# Patient Record
Sex: FEMALE | Race: WHITE
[De-identification: names, ages, dates, MRNs, and addresses within clinical notes are randomized per-mention and may not be internally consistent; named-entity substitution may affect disease eponyms.]

---

## 2015-10-05 RX ADMIN — Medication SCH MG: at 21:11

## 2015-10-05 RX ADMIN — Medication SCH MG: at 21:10

## 2015-10-05 RX ADMIN — Medication SCH EACH: at 21:10

## 2015-10-05 RX ADMIN — MAGNESIUM OXIDE TAB 400 MG (241.3 MG ELEMENTAL MG) SCH TAB: 400 (241.3 MG) TAB at 21:11

## 2015-10-05 NOTE — HP
CHIEF COMPLAINT:  Increasing leg weakness.

 

HISTORY OF PRESENT ILLNESS:  This is an 86-year-old female, who previously lived

independently at her home in [place], however, she is having increasing weakness

of her legs and difficulty getting around her home over the last several years,

but definitely increasing in the last 6 months.  The patient does have a history

of atrial fibrillation.  She denies any fast heart rates, unless she drinks

caffeine.  She has never had a heart attack or stroke.  She does have

hypothyroidism, treated with Synthroid.  Otherwise also does take metoprolol,

but no other prescription medications.  She relies on a bath aide to come in her

home and help her once a week.  She is having difficulty with getting dressed

due to shoulder pain.

 

ALLERGIES:  Include none.

 

MEDICATIONS:  Include, metoprolol 12.5 mg daily, Coenzyme Q10 100 b.i.d.,

calcium, magnesium and zinc daily, vitamin D 5000 units daily, Omega-3 1200

daily, multivitamin daily, levothyroxine 75 mcg daily and Pradaxa 150 b.i.d.

 

PAST MEDICAL HISTORY:  Includes,

1. Atrial fibrillation with possible TIA with vision changes back in ,

    admitted to [facility] for that.

2. Hypothyroidism.

3. Leg weakness.

4. Hypertension.

5. Osteoarthritis.

 

PAST SURGICAL HISTORY:  She has had cataracts and a partial hysterectomy.

 

FAMILY HISTORY:  Her mom had an MI at 62, passed away.  Dad had breast cancer at

age 80, but lived to [facility].  He had heart disease and a pacer as well.

 

SOCIAL HISTORY:  She is .  Her  had Alzheimer's,  about 4

years ago.  She has 4 children.  She has a daughter who lives in [place],

also who works at the hospital.  A son in [place].  Another son in [place] and one

in [place], [place].  She is a nonsmoker, nondrinker.

 

REVIEW OF SYSTEMS:

General:  No weight changes.

Neurologic:  No numbness, no tingling.

Musculoskeletal:  She has knee pain especially on the left knee.

Cardiac:  No chest pain.  No cough.  No shortness of breath.

Respiratory:  No coughing.

Abdomen:  No abdominal pain, diarrhea or constipation.  Otherwise all systems

reviewed and found to be negative unless otherwise stated.

 

PHYSICAL EXAMINATION:

Vital Signs:  Include a temperature of 98, pulse 52, blood pressure 143/70,

respiratory rate 16, O2 96% on room air.

General:  She is in no acute distress.

Heart:  Irregularly irregular without murmur.

Lungs:  Sounds are clear to auscultation bilaterally without crackles or

wheezes.

Abdomen:  Positive bowel sounds.  Soft and nontender.

Extremities:  Warm and dry, no edema.  Dorsalis pedis pulses 2+.

Mental Status:  She is alert and orientated x3.  Gait was not observed.

 

ASSESSMENT AND PLAN:  Leg weakness and pain, probably related to arthritis.  We

will continue to monitor her thyroid.  Her last TSH was suppressed.  We will

also check a CK, ESR, B12 level to rule out other causes of weakness and we will

do that right now.  She did have lab work back in August as well, and her

glucose was 125 at that point, so we will check an A1c also.  Otherwise, CBC

again for weakness.  We will continue to monitor her on Swing Bed with PT and OT

involved,  as well.  The patient does not feel she can return

home and needs assistance for dressing and bathing and toileting.

 

 

MKA:  10/05/2015 17:41:04  MODL:  10/05/2015 23:30:43

Job #:  [id number]

## 2015-10-06 LAB
CHLORIDE SERPL-SCNC: 106 MEQ/L (ref 98–107)
SODIUM SERPL-SCNC: 144 MEQ/L (ref 136–145)

## 2015-10-06 RX ADMIN — Medication SCH MG: at 20:19

## 2015-10-06 RX ADMIN — Medication SCH MG: at 09:01

## 2015-10-06 RX ADMIN — Medication SCH MG: at 08:59

## 2015-10-06 RX ADMIN — MAGNESIUM OXIDE TAB 400 MG (241.3 MG ELEMENTAL MG) SCH TAB: 400 (241.3 MG) TAB at 20:18

## 2015-10-06 RX ADMIN — Medication SCH UNIT: at 08:58

## 2015-10-06 RX ADMIN — Medication SCH MG: at 20:15

## 2015-10-06 RX ADMIN — Medication SCH EACH: at 20:14

## 2015-10-06 RX ADMIN — Medication SCH MG: at 20:18

## 2015-10-06 RX ADMIN — Medication SCH MG: at 09:00

## 2015-10-07 RX ADMIN — Medication SCH MG: at 09:06

## 2015-10-07 RX ADMIN — Medication SCH EACH: at 20:31

## 2015-10-07 RX ADMIN — Medication SCH EACH: at 20:32

## 2015-10-07 RX ADMIN — Medication SCH MG: at 20:33

## 2015-10-07 RX ADMIN — MAGNESIUM OXIDE TAB 400 MG (241.3 MG ELEMENTAL MG) SCH TAB: 400 (241.3 MG) TAB at 20:32

## 2015-10-07 RX ADMIN — Medication SCH MG: at 09:05

## 2015-10-07 RX ADMIN — Medication SCH UNIT: at 09:04

## 2015-10-08 RX ADMIN — Medication SCH MG: at 08:41

## 2015-10-08 RX ADMIN — Medication SCH EACH: at 08:43

## 2015-10-08 RX ADMIN — Medication SCH MG: at 20:03

## 2015-10-08 RX ADMIN — Medication SCH EACH: at 20:03

## 2015-10-08 RX ADMIN — MAGNESIUM OXIDE TAB 400 MG (241.3 MG ELEMENTAL MG) SCH TAB: 400 (241.3 MG) TAB at 20:03

## 2015-10-08 RX ADMIN — Medication SCH MG: at 08:42

## 2015-10-08 RX ADMIN — Medication SCH MG: at 20:04

## 2015-10-08 RX ADMIN — Medication SCH UNIT: at 08:42

## 2015-10-09 RX ADMIN — Medication SCH UNIT: at 09:41

## 2015-10-09 RX ADMIN — Medication SCH MG: at 19:49

## 2015-10-09 RX ADMIN — Medication SCH MG: at 09:43

## 2015-10-09 RX ADMIN — METOPROLOL TARTRATE SCH MG: 25 TABLET, FILM COATED ORAL at 09:42

## 2015-10-09 RX ADMIN — Medication SCH MG: at 19:47

## 2015-10-09 RX ADMIN — Medication SCH EACH: at 09:42

## 2015-10-09 RX ADMIN — MAGNESIUM OXIDE TAB 400 MG (241.3 MG ELEMENTAL MG) SCH TAB: 400 (241.3 MG) TAB at 19:48

## 2015-10-09 RX ADMIN — Medication SCH EACH: at 19:47

## 2015-10-10 RX ADMIN — MAGNESIUM OXIDE TAB 400 MG (241.3 MG ELEMENTAL MG) SCH TAB: 400 (241.3 MG) TAB at 20:50

## 2015-10-10 RX ADMIN — Medication SCH EACH: at 20:50

## 2015-10-10 RX ADMIN — Medication SCH EACH: at 10:04

## 2015-10-10 RX ADMIN — Medication SCH MG: at 10:04

## 2015-10-10 RX ADMIN — METOPROLOL TARTRATE SCH MG: 25 TABLET, FILM COATED ORAL at 10:02

## 2015-10-10 RX ADMIN — Medication SCH EACH: at 20:49

## 2015-10-10 RX ADMIN — Medication SCH MG: at 20:50

## 2015-10-10 RX ADMIN — Medication SCH UNIT: at 10:03

## 2015-10-11 RX ADMIN — METOPROLOL TARTRATE SCH MG: 25 TABLET, FILM COATED ORAL at 08:20

## 2015-10-11 RX ADMIN — MAGNESIUM OXIDE TAB 400 MG (241.3 MG ELEMENTAL MG) SCH TAB: 400 (241.3 MG) TAB at 20:22

## 2015-10-11 RX ADMIN — Medication SCH UNIT: at 08:20

## 2015-10-11 RX ADMIN — Medication SCH EACH: at 08:19

## 2015-10-11 RX ADMIN — Medication SCH EACH: at 20:21

## 2015-10-11 RX ADMIN — Medication SCH MG: at 08:20

## 2015-10-11 RX ADMIN — Medication SCH MG: at 20:22

## 2015-10-11 RX ADMIN — Medication SCH MG: at 20:21

## 2015-10-12 RX ADMIN — Medication SCH MG: at 08:52

## 2015-10-12 RX ADMIN — METOPROLOL TARTRATE SCH MG: 25 TABLET, FILM COATED ORAL at 08:51

## 2015-10-12 RX ADMIN — Medication SCH MG: at 20:01

## 2015-10-12 RX ADMIN — Medication SCH EACH: at 08:52

## 2015-10-12 RX ADMIN — Medication SCH MG: at 20:02

## 2015-10-12 RX ADMIN — Medication SCH EACH: at 20:02

## 2015-10-12 RX ADMIN — MAGNESIUM OXIDE TAB 400 MG (241.3 MG ELEMENTAL MG) SCH TAB: 400 (241.3 MG) TAB at 20:01

## 2015-10-12 RX ADMIN — Medication SCH EACH: at 20:01

## 2015-10-12 RX ADMIN — Medication SCH UNIT: at 08:51

## 2015-10-13 RX ADMIN — Medication SCH EACH: at 07:55

## 2015-10-13 RX ADMIN — Medication SCH UNIT: at 07:54

## 2015-10-13 RX ADMIN — METOPROLOL TARTRATE SCH: 25 TABLET, FILM COATED ORAL at 07:54

## 2015-10-13 RX ADMIN — Medication SCH MG: at 07:53

## 2015-10-13 RX ADMIN — METOPROLOL TARTRATE SCH MG: 25 TABLET, FILM COATED ORAL at 08:00

## 2015-10-13 RX ADMIN — Medication SCH EACH: at 19:37

## 2015-10-13 RX ADMIN — MAGNESIUM OXIDE TAB 400 MG (241.3 MG ELEMENTAL MG) SCH TAB: 400 (241.3 MG) TAB at 19:38

## 2015-10-13 RX ADMIN — Medication SCH MG: at 19:38

## 2015-10-14 RX ADMIN — MAGNESIUM OXIDE TAB 400 MG (241.3 MG ELEMENTAL MG) SCH TAB: 400 (241.3 MG) TAB at 20:39

## 2015-10-14 RX ADMIN — METOPROLOL TARTRATE SCH MG: 25 TABLET, FILM COATED ORAL at 07:57

## 2015-10-14 RX ADMIN — Medication SCH MG: at 07:57

## 2015-10-14 RX ADMIN — Medication SCH EACH: at 07:56

## 2015-10-14 RX ADMIN — Medication SCH MG: at 20:39

## 2015-10-14 RX ADMIN — Medication SCH EACH: at 20:39

## 2015-10-14 RX ADMIN — Medication SCH UNIT: at 07:57

## 2015-10-14 RX ADMIN — Medication SCH EACH: at 20:38

## 2015-10-15 RX ADMIN — Medication SCH MG: at 08:23

## 2015-10-15 RX ADMIN — METOPROLOL TARTRATE SCH MG: 25 TABLET, FILM COATED ORAL at 08:25

## 2015-10-15 RX ADMIN — Medication SCH UNIT: at 08:18

## 2015-10-15 RX ADMIN — Medication SCH EACH: at 08:23

## 2015-10-15 RX ADMIN — MAGNESIUM OXIDE TAB 400 MG (241.3 MG ELEMENTAL MG) SCH TAB: 400 (241.3 MG) TAB at 20:21

## 2015-10-15 RX ADMIN — Medication SCH MG: at 20:21

## 2015-10-15 RX ADMIN — Medication SCH EACH: at 20:20

## 2015-10-15 RX ADMIN — Medication SCH MG: at 20:20

## 2015-10-16 RX ADMIN — Medication SCH MG: at 20:48

## 2015-10-16 RX ADMIN — Medication SCH MG: at 08:55

## 2015-10-16 RX ADMIN — Medication SCH MG: at 20:47

## 2015-10-16 RX ADMIN — Medication SCH EACH: at 08:55

## 2015-10-16 RX ADMIN — Medication SCH EACH: at 20:47

## 2015-10-16 RX ADMIN — Medication SCH EACH: at 20:48

## 2015-10-16 RX ADMIN — MAGNESIUM OXIDE TAB 400 MG (241.3 MG ELEMENTAL MG) SCH TAB: 400 (241.3 MG) TAB at 20:47

## 2015-10-16 RX ADMIN — METOPROLOL TARTRATE SCH MG: 25 TABLET, FILM COATED ORAL at 08:54

## 2015-10-16 RX ADMIN — Medication SCH UNIT: at 08:54

## 2015-10-17 RX ADMIN — MAGNESIUM OXIDE TAB 400 MG (241.3 MG ELEMENTAL MG) SCH TAB: 400 (241.3 MG) TAB at 20:16

## 2015-10-17 RX ADMIN — Medication SCH UNIT: at 08:59

## 2015-10-17 RX ADMIN — METOPROLOL TARTRATE SCH MG: 25 TABLET, FILM COATED ORAL at 08:59

## 2015-10-17 RX ADMIN — Medication SCH MG: at 20:17

## 2015-10-17 RX ADMIN — Medication SCH EACH: at 20:17

## 2015-10-17 RX ADMIN — Medication SCH MG: at 08:59

## 2015-10-17 RX ADMIN — Medication SCH EACH: at 08:59

## 2015-10-17 RX ADMIN — Medication SCH EACH: at 20:16

## 2015-10-18 RX ADMIN — Medication SCH MG: at 19:56

## 2015-10-18 RX ADMIN — Medication SCH EACH: at 07:34

## 2015-10-18 RX ADMIN — METOPROLOL TARTRATE SCH MG: 25 TABLET, FILM COATED ORAL at 07:33

## 2015-10-18 RX ADMIN — Medication SCH EACH: at 19:56

## 2015-10-18 RX ADMIN — Medication SCH MG: at 07:34

## 2015-10-18 RX ADMIN — Medication SCH UNIT: at 07:32

## 2015-10-18 RX ADMIN — MAGNESIUM OXIDE TAB 400 MG (241.3 MG ELEMENTAL MG) SCH TAB: 400 (241.3 MG) TAB at 19:56

## 2015-10-19 RX ADMIN — METOPROLOL TARTRATE SCH MG: 25 TABLET, FILM COATED ORAL at 08:08

## 2015-10-19 RX ADMIN — Medication SCH EACH: at 20:34

## 2015-10-19 RX ADMIN — Medication SCH EACH: at 20:33

## 2015-10-19 RX ADMIN — Medication SCH MG: at 20:34

## 2015-10-19 RX ADMIN — Medication SCH MG: at 08:08

## 2015-10-19 RX ADMIN — MAGNESIUM OXIDE TAB 400 MG (241.3 MG ELEMENTAL MG) SCH TAB: 400 (241.3 MG) TAB at 20:34

## 2015-10-19 RX ADMIN — Medication SCH MG: at 20:35

## 2015-10-19 RX ADMIN — Medication SCH EACH: at 08:07

## 2015-10-19 RX ADMIN — Medication SCH UNIT: at 08:07

## 2015-10-20 RX ADMIN — Medication SCH MG: at 20:09

## 2015-10-20 RX ADMIN — Medication SCH UNIT: at 08:10

## 2015-10-20 RX ADMIN — Medication SCH EACH: at 08:10

## 2015-10-20 RX ADMIN — Medication SCH EACH: at 20:08

## 2015-10-20 RX ADMIN — MAGNESIUM OXIDE TAB 400 MG (241.3 MG ELEMENTAL MG) SCH TAB: 400 (241.3 MG) TAB at 20:08

## 2015-10-20 RX ADMIN — METOPROLOL TARTRATE SCH MG: 25 TABLET, FILM COATED ORAL at 08:11

## 2015-10-20 RX ADMIN — Medication SCH MG: at 08:11

## 2015-10-20 RX ADMIN — Medication SCH MG: at 20:08

## 2015-10-21 RX ADMIN — Medication SCH EACH: at 20:50

## 2015-10-21 RX ADMIN — Medication SCH MG: at 20:51

## 2015-10-21 RX ADMIN — Medication SCH EACH: at 20:51

## 2015-10-21 RX ADMIN — Medication SCH EACH: at 08:33

## 2015-10-21 RX ADMIN — Medication SCH MG: at 08:32

## 2015-10-21 RX ADMIN — Medication SCH UNIT: at 08:32

## 2015-10-21 RX ADMIN — METOPROLOL TARTRATE SCH MG: 25 TABLET, FILM COATED ORAL at 08:32

## 2015-10-21 RX ADMIN — MAGNESIUM OXIDE TAB 400 MG (241.3 MG ELEMENTAL MG) SCH TAB: 400 (241.3 MG) TAB at 20:51

## 2015-10-22 RX ADMIN — METOPROLOL TARTRATE SCH MG: 25 TABLET, FILM COATED ORAL at 08:27

## 2015-10-22 RX ADMIN — MAGNESIUM OXIDE TAB 400 MG (241.3 MG ELEMENTAL MG) SCH TAB: 400 (241.3 MG) TAB at 20:06

## 2015-10-22 RX ADMIN — Medication SCH MG: at 08:27

## 2015-10-22 RX ADMIN — Medication SCH EACH: at 20:05

## 2015-10-22 RX ADMIN — Medication SCH UNIT: at 08:27

## 2015-10-22 RX ADMIN — Medication SCH MG: at 20:06

## 2015-10-22 RX ADMIN — Medication SCH EACH: at 08:27

## 2015-10-23 RX ADMIN — Medication SCH UNIT: at 07:49

## 2015-10-23 RX ADMIN — METOPROLOL TARTRATE SCH MG: 25 TABLET, FILM COATED ORAL at 07:49

## 2015-10-23 RX ADMIN — Medication SCH EACH: at 07:50

## 2015-10-23 RX ADMIN — Medication SCH EACH: at 19:40

## 2015-10-23 RX ADMIN — Medication SCH EACH: at 19:39

## 2015-10-23 RX ADMIN — MAGNESIUM OXIDE TAB 400 MG (241.3 MG ELEMENTAL MG) SCH TAB: 400 (241.3 MG) TAB at 19:39

## 2015-10-23 RX ADMIN — Medication SCH MG: at 07:49

## 2015-10-23 RX ADMIN — Medication SCH MG: at 19:40

## 2015-10-23 RX ADMIN — Medication SCH MG: at 19:39

## 2015-10-24 RX ADMIN — Medication SCH EACH: at 19:41

## 2015-10-24 RX ADMIN — Medication SCH MG: at 08:37

## 2015-10-24 RX ADMIN — Medication SCH MG: at 19:43

## 2015-10-24 RX ADMIN — Medication SCH UNIT: at 08:36

## 2015-10-24 RX ADMIN — MAGNESIUM OXIDE TAB 400 MG (241.3 MG ELEMENTAL MG) SCH TAB: 400 (241.3 MG) TAB at 19:42

## 2015-10-24 RX ADMIN — Medication SCH EACH: at 08:37

## 2015-10-24 RX ADMIN — METOPROLOL TARTRATE SCH MG: 25 TABLET, FILM COATED ORAL at 08:36

## 2015-10-24 RX ADMIN — Medication SCH EACH: at 19:42

## 2015-10-24 RX ADMIN — Medication SCH MG: at 19:41

## 2015-10-25 RX ADMIN — Medication SCH EACH: at 20:10

## 2015-10-25 RX ADMIN — Medication SCH MG: at 20:11

## 2015-10-25 RX ADMIN — METOPROLOL TARTRATE SCH MG: 25 TABLET, FILM COATED ORAL at 08:56

## 2015-10-25 RX ADMIN — Medication SCH MG: at 08:56

## 2015-10-25 RX ADMIN — MAGNESIUM OXIDE TAB 400 MG (241.3 MG ELEMENTAL MG) SCH TAB: 400 (241.3 MG) TAB at 20:11

## 2015-10-25 RX ADMIN — Medication SCH EACH: at 08:59

## 2015-10-25 RX ADMIN — Medication SCH UNIT: at 08:58

## 2015-10-25 RX ADMIN — Medication SCH MG: at 20:12

## 2015-10-25 RX ADMIN — Medication SCH EACH: at 20:11

## 2015-10-26 RX ADMIN — Medication SCH MG: at 09:35

## 2015-10-26 RX ADMIN — MAGNESIUM OXIDE TAB 400 MG (241.3 MG ELEMENTAL MG) SCH TAB: 400 (241.3 MG) TAB at 20:56

## 2015-10-26 RX ADMIN — Medication SCH UNIT: at 09:34

## 2015-10-26 RX ADMIN — Medication SCH MG: at 20:57

## 2015-10-26 RX ADMIN — Medication SCH EACH: at 20:56

## 2015-10-26 RX ADMIN — METOPROLOL TARTRATE SCH MG: 25 TABLET, FILM COATED ORAL at 09:34

## 2015-10-26 RX ADMIN — Medication SCH EACH: at 09:34

## 2015-10-26 RX ADMIN — Medication SCH EACH: at 20:55

## 2015-10-26 RX ADMIN — Medication SCH MG: at 20:56

## 2015-10-27 RX ADMIN — METOPROLOL TARTRATE SCH MG: 25 TABLET, FILM COATED ORAL at 08:47

## 2015-10-27 RX ADMIN — Medication SCH MG: at 20:20

## 2015-10-27 RX ADMIN — Medication SCH MG: at 08:46

## 2015-10-27 RX ADMIN — MAGNESIUM OXIDE TAB 400 MG (241.3 MG ELEMENTAL MG) SCH TAB: 400 (241.3 MG) TAB at 20:20

## 2015-10-27 RX ADMIN — Medication SCH MG: at 20:21

## 2015-10-27 RX ADMIN — Medication SCH EACH: at 08:49

## 2015-10-27 RX ADMIN — Medication SCH EACH: at 20:20

## 2015-10-27 RX ADMIN — Medication SCH UNIT: at 08:46

## 2015-10-28 RX ADMIN — METOPROLOL TARTRATE SCH MG: 25 TABLET, FILM COATED ORAL at 09:07

## 2015-10-28 RX ADMIN — Medication SCH MG: at 19:54

## 2015-10-28 RX ADMIN — MAGNESIUM OXIDE TAB 400 MG (241.3 MG ELEMENTAL MG) SCH TAB: 400 (241.3 MG) TAB at 19:54

## 2015-10-28 RX ADMIN — Medication SCH UNIT: at 09:07

## 2015-10-28 RX ADMIN — Medication SCH EACH: at 09:07

## 2015-10-28 RX ADMIN — Medication SCH MG: at 09:07

## 2015-10-28 RX ADMIN — Medication SCH EACH: at 19:53

## 2015-10-29 RX ADMIN — MAGNESIUM OXIDE TAB 400 MG (241.3 MG ELEMENTAL MG) SCH TAB: 400 (241.3 MG) TAB at 19:41

## 2015-10-29 RX ADMIN — Medication SCH UNIT: at 08:50

## 2015-10-29 RX ADMIN — Medication SCH MG: at 08:50

## 2015-10-29 RX ADMIN — Medication SCH EACH: at 08:49

## 2015-10-29 RX ADMIN — Medication SCH EACH: at 19:41

## 2015-10-29 RX ADMIN — METOPROLOL TARTRATE SCH MG: 25 TABLET, FILM COATED ORAL at 08:50

## 2015-10-29 RX ADMIN — Medication SCH MG: at 19:42

## 2015-10-29 RX ADMIN — Medication SCH MG: at 19:41

## 2015-10-30 RX ADMIN — Medication SCH MG: at 20:05

## 2015-10-30 RX ADMIN — METOPROLOL TARTRATE SCH MG: 25 TABLET, FILM COATED ORAL at 09:23

## 2015-10-30 RX ADMIN — MAGNESIUM OXIDE TAB 400 MG (241.3 MG ELEMENTAL MG) SCH TAB: 400 (241.3 MG) TAB at 20:04

## 2015-10-30 RX ADMIN — Medication SCH UNIT: at 09:24

## 2015-10-30 RX ADMIN — Medication SCH MG: at 09:23

## 2015-10-30 RX ADMIN — Medication SCH EACH: at 20:04

## 2015-10-30 RX ADMIN — Medication SCH EACH: at 09:23

## 2015-10-31 RX ADMIN — MAGNESIUM OXIDE TAB 400 MG (241.3 MG ELEMENTAL MG) SCH TAB: 400 (241.3 MG) TAB at 20:04

## 2015-10-31 RX ADMIN — Medication SCH UNIT: at 08:27

## 2015-10-31 RX ADMIN — Medication SCH MG: at 08:27

## 2015-10-31 RX ADMIN — METOPROLOL TARTRATE SCH MG: 25 TABLET, FILM COATED ORAL at 08:28

## 2015-10-31 RX ADMIN — Medication SCH MG: at 20:03

## 2015-10-31 RX ADMIN — Medication SCH EACH: at 20:03

## 2015-10-31 RX ADMIN — Medication SCH EACH: at 08:26

## 2015-10-31 RX ADMIN — Medication SCH MG: at 20:04

## 2015-11-01 RX ADMIN — MAGNESIUM OXIDE TAB 400 MG (241.3 MG ELEMENTAL MG) SCH TAB: 400 (241.3 MG) TAB at 20:23

## 2015-11-01 RX ADMIN — Medication SCH MG: at 07:46

## 2015-11-01 RX ADMIN — METOPROLOL TARTRATE SCH MG: 25 TABLET, FILM COATED ORAL at 07:45

## 2015-11-01 RX ADMIN — Medication SCH MG: at 20:23

## 2015-11-01 RX ADMIN — Medication SCH EACH: at 07:46

## 2015-11-01 RX ADMIN — Medication SCH EACH: at 20:22

## 2015-11-01 RX ADMIN — Medication SCH MG: at 20:22

## 2015-11-01 RX ADMIN — Medication SCH UNIT: at 07:44

## 2015-11-02 RX ADMIN — Medication SCH MG: at 08:45

## 2015-11-02 RX ADMIN — Medication SCH MG: at 20:01

## 2015-11-02 RX ADMIN — Medication SCH MG: at 20:00

## 2015-11-02 RX ADMIN — Medication SCH UNIT: at 08:45

## 2015-11-02 RX ADMIN — METOPROLOL TARTRATE SCH MG: 25 TABLET, FILM COATED ORAL at 08:45

## 2015-11-02 RX ADMIN — Medication SCH EACH: at 08:44

## 2015-11-02 RX ADMIN — MAGNESIUM OXIDE TAB 400 MG (241.3 MG ELEMENTAL MG) SCH TAB: 400 (241.3 MG) TAB at 20:01

## 2015-11-02 RX ADMIN — Medication SCH EACH: at 20:00

## 2015-11-02 NOTE — PN
Progress Note for JENIFER REWIN  Date:  11/02/2015  Room #:  VM.207

 

SUBJECTIVE:  This is an 86-year-old, on swing bed due to impaired mobility.  She

is using assistance to help her get dressed.  I reviewed her lab work with her

and all looked okay.  She is still having a lot of weakness in her legs and her

arms, but it has been coming on for years and her sedimentation rate was normal.

She has no pain, no cough, no shortness of breath.

 

OBJECTIVE:  Vital Signs:  Objectively, her temperature is 98.2, pulse 91, blood

pressure 151/85, respiratory rate 20, O2 is 96 on room air.

General:  She is in no acute distress.

Heart:  Irregularly irregular.

Lungs:  Clear to auscultation bilaterally without crackles or wheezes.

Extremities:  Warm and dry.  No edema.

Mental Status:  Alert and orientated x3.

 

ASSESSMENT:

1. Hypothyroidism, slightly overtreated.  I am going to try skipping just 1

    day a week on the levothyroxine.  We will repeat a level at the end of

    December.

2. Atrial fibrillation with probable transient ischemic attack back in 2011.

3. Chronic leg weakness.

4. Hypertension.

5. Osteoarthritis.

 

PLAN:  The patient will continue swing bed cares.  Will continue to assist her

with her ADL's.  Will continue to encourage ambulation.  She has not had any

falls.

 

 

MKA:  11/02/2015 09:09:14  MODL:  11/02/2015 09:19:11

Job #:  [id number]

## 2015-11-03 RX ADMIN — Medication SCH MG: at 21:25

## 2015-11-03 RX ADMIN — Medication SCH MG: at 08:42

## 2015-11-03 RX ADMIN — Medication SCH UNIT: at 08:41

## 2015-11-03 RX ADMIN — MAGNESIUM OXIDE TAB 400 MG (241.3 MG ELEMENTAL MG) SCH TAB: 400 (241.3 MG) TAB at 21:25

## 2015-11-03 RX ADMIN — METOPROLOL TARTRATE SCH MG: 25 TABLET, FILM COATED ORAL at 08:42

## 2015-11-03 RX ADMIN — Medication SCH EACH: at 21:24

## 2015-11-03 RX ADMIN — Medication SCH EACH: at 08:43

## 2015-11-03 RX ADMIN — Medication SCH EACH: at 21:25

## 2015-11-04 RX ADMIN — Medication SCH UNIT: at 08:20

## 2015-11-04 RX ADMIN — Medication SCH EACH: at 20:11

## 2015-11-04 RX ADMIN — METOPROLOL TARTRATE SCH MG: 25 TABLET, FILM COATED ORAL at 08:20

## 2015-11-04 RX ADMIN — Medication SCH EACH: at 08:22

## 2015-11-04 RX ADMIN — Medication SCH MG: at 08:20

## 2015-11-04 RX ADMIN — Medication SCH MG: at 20:11

## 2015-11-04 RX ADMIN — MAGNESIUM OXIDE TAB 400 MG (241.3 MG ELEMENTAL MG) SCH: 400 (241.3 MG) TAB at 20:14

## 2015-11-05 RX ADMIN — METOPROLOL TARTRATE SCH MG: 25 TABLET, FILM COATED ORAL at 09:09

## 2015-11-05 RX ADMIN — Medication SCH MG: at 09:09

## 2015-11-05 RX ADMIN — Medication SCH UNIT: at 09:08

## 2015-11-05 RX ADMIN — MAGNESIUM OXIDE TAB 400 MG (241.3 MG ELEMENTAL MG) SCH TAB: 400 (241.3 MG) TAB at 20:54

## 2015-11-05 RX ADMIN — Medication SCH EACH: at 20:53

## 2015-11-05 RX ADMIN — Medication SCH MG: at 20:54

## 2015-11-05 RX ADMIN — Medication SCH EACH: at 20:54

## 2015-11-05 RX ADMIN — Medication SCH EACH: at 09:10

## 2015-11-06 RX ADMIN — METOPROLOL TARTRATE SCH MG: 25 TABLET, FILM COATED ORAL at 08:46

## 2015-11-06 RX ADMIN — Medication SCH MG: at 19:54

## 2015-11-06 RX ADMIN — Medication SCH UNIT: at 08:46

## 2015-11-06 RX ADMIN — Medication SCH EACH: at 19:55

## 2015-11-06 RX ADMIN — MAGNESIUM OXIDE TAB 400 MG (241.3 MG ELEMENTAL MG) SCH TAB: 400 (241.3 MG) TAB at 19:54

## 2015-11-06 RX ADMIN — Medication SCH EACH: at 19:54

## 2015-11-06 RX ADMIN — Medication SCH EACH: at 08:46

## 2015-11-06 RX ADMIN — Medication SCH MG: at 08:47

## 2015-11-07 RX ADMIN — Medication SCH EACH: at 19:40

## 2015-11-07 RX ADMIN — MAGNESIUM OXIDE TAB 400 MG (241.3 MG ELEMENTAL MG) SCH TAB: 400 (241.3 MG) TAB at 19:40

## 2015-11-07 RX ADMIN — METOPROLOL TARTRATE SCH MG: 25 TABLET, FILM COATED ORAL at 08:46

## 2015-11-07 RX ADMIN — Medication SCH MG: at 19:40

## 2015-11-07 RX ADMIN — Medication SCH MG: at 19:41

## 2015-11-07 RX ADMIN — Medication SCH UNIT: at 08:46

## 2015-11-07 RX ADMIN — Medication SCH EACH: at 08:45

## 2015-11-07 RX ADMIN — Medication SCH MG: at 08:45

## 2015-11-07 RX ADMIN — Medication SCH EACH: at 19:41

## 2015-11-08 RX ADMIN — MAGNESIUM OXIDE TAB 400 MG (241.3 MG ELEMENTAL MG) SCH TAB: 400 (241.3 MG) TAB at 19:56

## 2015-11-08 RX ADMIN — Medication SCH UNIT: at 09:10

## 2015-11-08 RX ADMIN — METOPROLOL TARTRATE SCH MG: 25 TABLET, FILM COATED ORAL at 09:10

## 2015-11-08 RX ADMIN — Medication SCH EACH: at 19:56

## 2015-11-08 RX ADMIN — Medication SCH MG: at 19:56

## 2015-11-08 RX ADMIN — Medication SCH MG: at 09:10

## 2015-11-08 RX ADMIN — Medication SCH EACH: at 19:55

## 2015-11-08 RX ADMIN — Medication SCH EACH: at 09:10

## 2015-11-09 RX ADMIN — Medication SCH UNIT: at 09:14

## 2015-11-09 RX ADMIN — METOPROLOL TARTRATE SCH MG: 25 TABLET, FILM COATED ORAL at 09:15

## 2015-11-09 RX ADMIN — Medication SCH MG: at 20:40

## 2015-11-09 RX ADMIN — Medication SCH EACH: at 09:14

## 2015-11-09 RX ADMIN — Medication SCH MG: at 09:13

## 2015-11-09 RX ADMIN — Medication SCH EACH: at 20:40

## 2015-11-09 RX ADMIN — MAGNESIUM OXIDE TAB 400 MG (241.3 MG ELEMENTAL MG) SCH TAB: 400 (241.3 MG) TAB at 20:40

## 2015-11-09 RX ADMIN — Medication SCH EACH: at 20:39

## 2015-11-10 RX ADMIN — Medication SCH MG: at 20:16

## 2015-11-10 RX ADMIN — Medication SCH MG: at 08:46

## 2015-11-10 RX ADMIN — MAGNESIUM OXIDE TAB 400 MG (241.3 MG ELEMENTAL MG) SCH TAB: 400 (241.3 MG) TAB at 20:16

## 2015-11-10 RX ADMIN — Medication SCH UNIT: at 08:47

## 2015-11-10 RX ADMIN — METOPROLOL TARTRATE SCH MG: 25 TABLET, FILM COATED ORAL at 08:47

## 2015-11-10 RX ADMIN — Medication SCH EACH: at 08:48

## 2015-11-10 RX ADMIN — Medication SCH EACH: at 20:16

## 2015-11-11 RX ADMIN — Medication SCH MG: at 19:59

## 2015-11-11 RX ADMIN — Medication SCH UNIT: at 08:33

## 2015-11-11 RX ADMIN — Medication SCH MG: at 08:34

## 2015-11-11 RX ADMIN — MAGNESIUM OXIDE TAB 400 MG (241.3 MG ELEMENTAL MG) SCH TAB: 400 (241.3 MG) TAB at 19:59

## 2015-11-11 RX ADMIN — METOPROLOL TARTRATE SCH MG: 25 TABLET, FILM COATED ORAL at 08:33

## 2015-11-11 RX ADMIN — Medication SCH EACH: at 19:59

## 2015-11-11 RX ADMIN — Medication SCH EACH: at 08:33

## 2015-11-12 RX ADMIN — Medication SCH EACH: at 19:31

## 2015-11-12 RX ADMIN — Medication SCH MG: at 09:36

## 2015-11-12 RX ADMIN — Medication SCH MG: at 19:32

## 2015-11-12 RX ADMIN — METOPROLOL TARTRATE SCH MG: 25 TABLET, FILM COATED ORAL at 09:36

## 2015-11-12 RX ADMIN — Medication SCH EACH: at 09:35

## 2015-11-12 RX ADMIN — Medication SCH UNIT: at 09:35

## 2015-11-12 RX ADMIN — MAGNESIUM OXIDE TAB 400 MG (241.3 MG ELEMENTAL MG) SCH TAB: 400 (241.3 MG) TAB at 19:32

## 2015-11-13 RX ADMIN — Medication SCH EACH: at 19:39

## 2015-11-13 RX ADMIN — METOPROLOL TARTRATE SCH: 25 TABLET, FILM COATED ORAL at 08:49

## 2015-11-13 RX ADMIN — Medication SCH MG: at 08:48

## 2015-11-13 RX ADMIN — MAGNESIUM OXIDE TAB 400 MG (241.3 MG ELEMENTAL MG) SCH TAB: 400 (241.3 MG) TAB at 19:40

## 2015-11-13 RX ADMIN — Medication SCH MG: at 19:40

## 2015-11-13 RX ADMIN — Medication SCH UNIT: at 08:49

## 2015-11-13 RX ADMIN — Medication SCH MG: at 19:39

## 2015-11-13 RX ADMIN — Medication SCH EACH: at 08:52

## 2015-11-14 RX ADMIN — METOPROLOL TARTRATE SCH: 25 TABLET, FILM COATED ORAL at 08:09

## 2015-11-14 RX ADMIN — Medication SCH UNIT: at 08:08

## 2015-11-14 RX ADMIN — Medication SCH EACH: at 19:30

## 2015-11-14 RX ADMIN — Medication SCH EACH: at 08:09

## 2015-11-14 RX ADMIN — Medication SCH MG: at 19:31

## 2015-11-14 RX ADMIN — Medication SCH EACH: at 19:29

## 2015-11-14 RX ADMIN — Medication SCH MG: at 08:08

## 2015-11-14 RX ADMIN — MAGNESIUM OXIDE TAB 400 MG (241.3 MG ELEMENTAL MG) SCH TAB: 400 (241.3 MG) TAB at 19:31

## 2015-11-15 RX ADMIN — Medication SCH UNIT: at 08:21

## 2015-11-15 RX ADMIN — Medication SCH EACH: at 08:22

## 2015-11-15 RX ADMIN — MAGNESIUM OXIDE TAB 400 MG (241.3 MG ELEMENTAL MG) SCH TAB: 400 (241.3 MG) TAB at 21:07

## 2015-11-15 RX ADMIN — Medication SCH MG: at 21:09

## 2015-11-15 RX ADMIN — METOPROLOL TARTRATE SCH MG: 25 TABLET, FILM COATED ORAL at 08:20

## 2015-11-15 RX ADMIN — Medication SCH EACH: at 21:08

## 2015-11-15 RX ADMIN — Medication SCH MG: at 08:22

## 2015-11-16 RX ADMIN — Medication SCH MG: at 09:07

## 2015-11-16 RX ADMIN — Medication SCH EACH: at 09:07

## 2015-11-16 RX ADMIN — MAGNESIUM OXIDE TAB 400 MG (241.3 MG ELEMENTAL MG) SCH TAB: 400 (241.3 MG) TAB at 20:23

## 2015-11-16 RX ADMIN — Medication SCH EACH: at 20:23

## 2015-11-16 RX ADMIN — LEVOTHYROXINE SODIUM SCH MCG: 75 TABLET ORAL at 06:06

## 2015-11-16 RX ADMIN — Medication SCH MG: at 20:23

## 2015-11-16 RX ADMIN — Medication SCH UNIT: at 09:06

## 2015-11-17 RX ADMIN — Medication SCH MG: at 07:33

## 2015-11-17 RX ADMIN — Medication SCH UNIT: at 07:34

## 2015-11-17 RX ADMIN — Medication SCH EACH: at 20:06

## 2015-11-17 RX ADMIN — Medication SCH EACH: at 07:34

## 2015-11-17 RX ADMIN — Medication SCH MG: at 20:07

## 2015-11-17 RX ADMIN — LEVOTHYROXINE SODIUM SCH MCG: 75 TABLET ORAL at 06:25

## 2015-11-17 RX ADMIN — MAGNESIUM OXIDE TAB 400 MG (241.3 MG ELEMENTAL MG) SCH TAB: 400 (241.3 MG) TAB at 20:07

## 2015-11-18 RX ADMIN — Medication SCH EACH: at 19:43

## 2015-11-18 RX ADMIN — Medication SCH MG: at 09:24

## 2015-11-18 RX ADMIN — Medication SCH UNIT: at 09:24

## 2015-11-18 RX ADMIN — Medication SCH MG: at 19:45

## 2015-11-18 RX ADMIN — LEVOTHYROXINE SODIUM SCH MCG: 75 TABLET ORAL at 06:36

## 2015-11-18 RX ADMIN — MAGNESIUM OXIDE TAB 400 MG (241.3 MG ELEMENTAL MG) SCH TAB: 400 (241.3 MG) TAB at 19:44

## 2015-11-18 RX ADMIN — Medication SCH MG: at 19:44

## 2015-11-18 RX ADMIN — Medication SCH EACH: at 09:24

## 2015-11-19 RX ADMIN — Medication SCH EACH: at 09:32

## 2015-11-19 RX ADMIN — Medication SCH MG: at 20:35

## 2015-11-19 RX ADMIN — Medication SCH EACH: at 20:35

## 2015-11-19 RX ADMIN — Medication SCH MG: at 09:32

## 2015-11-19 RX ADMIN — Medication SCH UNIT: at 09:32

## 2015-11-19 RX ADMIN — LEVOTHYROXINE SODIUM SCH MCG: 75 TABLET ORAL at 06:32

## 2015-11-19 RX ADMIN — MAGNESIUM OXIDE TAB 400 MG (241.3 MG ELEMENTAL MG) SCH TAB: 400 (241.3 MG) TAB at 20:35

## 2015-11-19 RX ADMIN — Medication SCH MG: at 20:34

## 2015-11-20 RX ADMIN — Medication SCH MG: at 08:43

## 2015-11-20 RX ADMIN — Medication SCH EACH: at 21:20

## 2015-11-20 RX ADMIN — Medication SCH UNIT: at 08:43

## 2015-11-20 RX ADMIN — Medication SCH EACH: at 08:43

## 2015-11-20 RX ADMIN — LEVOTHYROXINE SODIUM SCH MCG: 75 TABLET ORAL at 06:37

## 2015-11-20 RX ADMIN — MAGNESIUM OXIDE TAB 400 MG (241.3 MG ELEMENTAL MG) SCH TAB: 400 (241.3 MG) TAB at 21:19

## 2015-11-20 RX ADMIN — Medication SCH MG: at 21:20

## 2015-11-21 RX ADMIN — Medication SCH UNIT: at 09:25

## 2015-11-21 RX ADMIN — Medication SCH EACH: at 09:25

## 2015-11-21 RX ADMIN — Medication SCH MG: at 19:51

## 2015-11-21 RX ADMIN — Medication SCH EACH: at 19:51

## 2015-11-21 RX ADMIN — Medication SCH MG: at 19:52

## 2015-11-21 RX ADMIN — MAGNESIUM OXIDE TAB 400 MG (241.3 MG ELEMENTAL MG) SCH TAB: 400 (241.3 MG) TAB at 19:51

## 2015-11-21 RX ADMIN — Medication SCH MG: at 09:26

## 2015-11-22 RX ADMIN — Medication SCH EACH: at 18:30

## 2015-11-22 RX ADMIN — Medication SCH MG: at 18:30

## 2015-11-22 RX ADMIN — LEVOTHYROXINE SODIUM SCH MCG: 75 TABLET ORAL at 06:31

## 2015-11-22 RX ADMIN — MAGNESIUM OXIDE TAB 400 MG (241.3 MG ELEMENTAL MG) SCH TAB: 400 (241.3 MG) TAB at 18:30

## 2015-11-22 RX ADMIN — Medication SCH MG: at 08:52

## 2015-11-22 RX ADMIN — Medication SCH EACH: at 18:29

## 2015-11-22 RX ADMIN — Medication SCH UNIT: at 08:52

## 2015-11-22 RX ADMIN — Medication SCH EACH: at 08:51

## 2015-11-23 RX ADMIN — Medication SCH EACH: at 21:34

## 2015-11-23 RX ADMIN — MAGNESIUM OXIDE TAB 400 MG (241.3 MG ELEMENTAL MG) SCH TAB: 400 (241.3 MG) TAB at 21:33

## 2015-11-23 RX ADMIN — Medication SCH MG: at 08:51

## 2015-11-23 RX ADMIN — Medication SCH MG: at 21:33

## 2015-11-23 RX ADMIN — Medication SCH EACH: at 21:32

## 2015-11-23 RX ADMIN — Medication SCH EACH: at 08:51

## 2015-11-23 RX ADMIN — Medication SCH UNIT: at 08:50

## 2015-11-23 RX ADMIN — LEVOTHYROXINE SODIUM SCH MCG: 75 TABLET ORAL at 06:33

## 2015-11-24 RX ADMIN — Medication SCH MG: at 09:17

## 2015-11-24 RX ADMIN — Medication SCH MG: at 21:52

## 2015-11-24 RX ADMIN — Medication SCH EACH: at 09:17

## 2015-11-24 RX ADMIN — MAGNESIUM OXIDE TAB 400 MG (241.3 MG ELEMENTAL MG) SCH TAB: 400 (241.3 MG) TAB at 21:52

## 2015-11-24 RX ADMIN — Medication SCH MG: at 21:53

## 2015-11-24 RX ADMIN — Medication SCH EACH: at 21:53

## 2015-11-24 RX ADMIN — Medication SCH UNIT: at 09:17

## 2015-11-24 RX ADMIN — Medication SCH EACH: at 21:52

## 2015-11-24 RX ADMIN — LEVOTHYROXINE SODIUM SCH MCG: 75 TABLET ORAL at 06:26

## 2015-11-25 RX ADMIN — LEVOTHYROXINE SODIUM SCH MCG: 75 TABLET ORAL at 06:38

## 2015-11-25 RX ADMIN — Medication SCH UNIT: at 09:50

## 2015-11-25 RX ADMIN — Medication SCH MG: at 09:51

## 2015-11-25 RX ADMIN — MAGNESIUM OXIDE TAB 400 MG (241.3 MG ELEMENTAL MG) SCH TAB: 400 (241.3 MG) TAB at 21:04

## 2015-11-25 RX ADMIN — Medication SCH EACH: at 21:04

## 2015-11-25 RX ADMIN — Medication SCH EACH: at 09:50

## 2015-11-25 RX ADMIN — Medication SCH MG: at 21:04

## 2015-11-26 RX ADMIN — Medication SCH MG: at 21:51

## 2015-11-26 RX ADMIN — Medication SCH MG: at 08:49

## 2015-11-26 RX ADMIN — Medication SCH UNIT: at 08:49

## 2015-11-26 RX ADMIN — Medication SCH EACH: at 08:49

## 2015-11-26 RX ADMIN — MAGNESIUM OXIDE TAB 400 MG (241.3 MG ELEMENTAL MG) SCH TAB: 400 (241.3 MG) TAB at 21:52

## 2015-11-26 RX ADMIN — Medication SCH MG: at 21:52

## 2015-11-26 RX ADMIN — LEVOTHYROXINE SODIUM SCH MCG: 75 TABLET ORAL at 06:03

## 2015-11-26 RX ADMIN — Medication SCH EACH: at 21:51

## 2015-11-27 RX ADMIN — MAGNESIUM OXIDE TAB 400 MG (241.3 MG ELEMENTAL MG) SCH TAB: 400 (241.3 MG) TAB at 20:18

## 2015-11-27 RX ADMIN — Medication SCH EACH: at 08:32

## 2015-11-27 RX ADMIN — Medication SCH UNIT: at 08:32

## 2015-11-27 RX ADMIN — Medication SCH EACH: at 20:18

## 2015-11-27 RX ADMIN — Medication SCH MG: at 08:32

## 2015-11-27 RX ADMIN — Medication SCH MG: at 20:18

## 2015-11-27 RX ADMIN — LEVOTHYROXINE SODIUM SCH MCG: 75 TABLET ORAL at 05:56

## 2015-11-28 RX ADMIN — Medication SCH EACH: at 20:27

## 2015-11-28 RX ADMIN — MAGNESIUM OXIDE TAB 400 MG (241.3 MG ELEMENTAL MG) SCH TAB: 400 (241.3 MG) TAB at 20:28

## 2015-11-28 RX ADMIN — Medication SCH MG: at 20:27

## 2015-11-28 RX ADMIN — Medication SCH EACH: at 10:13

## 2015-11-28 RX ADMIN — Medication SCH UNIT: at 10:13

## 2015-11-28 RX ADMIN — Medication SCH MG: at 10:13

## 2015-11-28 RX ADMIN — Medication SCH MG: at 20:28

## 2015-11-28 RX ADMIN — Medication SCH EACH: at 20:26

## 2015-11-29 RX ADMIN — MAGNESIUM OXIDE TAB 400 MG (241.3 MG ELEMENTAL MG) SCH TAB: 400 (241.3 MG) TAB at 22:12

## 2015-11-29 RX ADMIN — LEVOTHYROXINE SODIUM SCH MCG: 75 TABLET ORAL at 06:19

## 2015-11-29 RX ADMIN — Medication SCH UNIT: at 08:29

## 2015-11-29 RX ADMIN — Medication SCH MG: at 22:12

## 2015-11-29 RX ADMIN — Medication SCH EACH: at 08:29

## 2015-11-29 RX ADMIN — Medication SCH EACH: at 22:12

## 2015-11-29 RX ADMIN — Medication SCH MG: at 08:29

## 2015-11-29 RX ADMIN — Medication SCH EACH: at 22:11

## 2015-11-30 RX ADMIN — Medication SCH MG: at 09:05

## 2015-11-30 RX ADMIN — Medication SCH UNIT: at 09:05

## 2015-11-30 RX ADMIN — Medication SCH MG: at 20:08

## 2015-11-30 RX ADMIN — LEVOTHYROXINE SODIUM SCH MCG: 75 TABLET ORAL at 06:10

## 2015-11-30 RX ADMIN — Medication SCH MG: at 20:07

## 2015-11-30 RX ADMIN — Medication SCH EACH: at 09:05

## 2015-11-30 RX ADMIN — Medication SCH EACH: at 20:07

## 2015-11-30 RX ADMIN — MAGNESIUM OXIDE TAB 400 MG (241.3 MG ELEMENTAL MG) SCH TAB: 400 (241.3 MG) TAB at 20:07

## 2015-12-01 RX ADMIN — MAGNESIUM OXIDE TAB 400 MG (241.3 MG ELEMENTAL MG) SCH TAB: 400 (241.3 MG) TAB at 21:14

## 2015-12-01 RX ADMIN — Medication SCH EACH: at 08:46

## 2015-12-01 RX ADMIN — Medication SCH MG: at 21:14

## 2015-12-01 RX ADMIN — Medication SCH EACH: at 21:14

## 2015-12-01 RX ADMIN — LEVOTHYROXINE SODIUM SCH MCG: 75 TABLET ORAL at 06:05

## 2015-12-01 RX ADMIN — Medication SCH UNIT: at 08:46

## 2015-12-01 RX ADMIN — Medication SCH MG: at 08:45

## 2015-12-01 NOTE — PN
Progress Note for JENIFER SHEETS  Date:  12/01/2015  Room #:  VM.207

 

SUBJECTIVE:  An 86-year-old seen today for 30-day reassessment.  The patient is

on swing bed due to impaired mobility.  She states yesterday she was having more

problems with her knees.  She does use a walker to get around.  She does use

assistance to get her dress on and for bathing.  She has decided she will

probably stay in [place] on swing bed instead of going to a nursing home in

[place].  Otherwise, she is not having any chest pain or trouble breathing.

 

OBJECTIVE:  Vital Signs:  Objectively, her temperature has been 97.5, pulse 72,

blood pressure 129/78, respiratory rate 16, O2 is 96 on room air.

General:  She is in no acute distress.

Heart:  Irregularly irregular.

Lungs:  Lung sounds are clear to auscultation bilaterally without crackles or

wheezes.

Abdomen:  Not examined.

Extremities:  Warm and dry.  No edema.

Mental Status:  Alert and orientated x3.

 

ASSESSMENT:

1. Hypothyroidism, slightly overtreated.  We are going to check her TSH this

    month.

2. Atrial fibrillation with history of transient ischemic attack, on

    anticoagulation.

3. Chronic leg weakness.

4. Hypertension.

5. Osteoarthritis.

 

PLAN:  The patient will continue swing bed cares for assistance in ADLs.  I will

check a thyroid level this month.

 

 

MKA:  12/01/2015 11:30:29  MODL:  12/01/2015 11:45:22

Job #:  [id number]

## 2015-12-02 RX ADMIN — Medication SCH UNIT: at 09:26

## 2015-12-02 RX ADMIN — MAGNESIUM OXIDE TAB 400 MG (241.3 MG ELEMENTAL MG) SCH TAB: 400 (241.3 MG) TAB at 20:44

## 2015-12-02 RX ADMIN — Medication SCH MG: at 20:44

## 2015-12-02 RX ADMIN — LEVOTHYROXINE SODIUM SCH MCG: 75 TABLET ORAL at 06:06

## 2015-12-02 RX ADMIN — Medication SCH MG: at 09:26

## 2015-12-02 RX ADMIN — Medication SCH EACH: at 20:44

## 2015-12-02 RX ADMIN — Medication SCH EACH: at 09:26

## 2015-12-03 RX ADMIN — Medication SCH MG: at 19:39

## 2015-12-03 RX ADMIN — MAGNESIUM OXIDE TAB 400 MG (241.3 MG ELEMENTAL MG) SCH TAB: 400 (241.3 MG) TAB at 19:40

## 2015-12-03 RX ADMIN — Medication SCH MG: at 09:43

## 2015-12-03 RX ADMIN — Medication SCH UNIT: at 09:43

## 2015-12-03 RX ADMIN — Medication SCH MG: at 19:40

## 2015-12-03 RX ADMIN — Medication SCH EACH: at 19:39

## 2015-12-03 RX ADMIN — Medication SCH EACH: at 09:43

## 2015-12-03 RX ADMIN — LEVOTHYROXINE SODIUM SCH MCG: 75 TABLET ORAL at 06:23

## 2015-12-04 RX ADMIN — Medication SCH MG: at 20:28

## 2015-12-04 RX ADMIN — Medication SCH EACH: at 08:58

## 2015-12-04 RX ADMIN — Medication SCH MG: at 08:57

## 2015-12-04 RX ADMIN — MAGNESIUM OXIDE TAB 400 MG (241.3 MG ELEMENTAL MG) SCH TAB: 400 (241.3 MG) TAB at 20:28

## 2015-12-04 RX ADMIN — Medication SCH UNIT: at 08:58

## 2015-12-04 RX ADMIN — LEVOTHYROXINE SODIUM SCH MCG: 75 TABLET ORAL at 06:40

## 2015-12-04 RX ADMIN — Medication SCH EACH: at 20:27

## 2015-12-05 RX ADMIN — Medication SCH EACH: at 19:26

## 2015-12-05 RX ADMIN — MAGNESIUM OXIDE TAB 400 MG (241.3 MG ELEMENTAL MG) SCH TAB: 400 (241.3 MG) TAB at 19:27

## 2015-12-05 RX ADMIN — Medication SCH MG: at 08:32

## 2015-12-05 RX ADMIN — Medication SCH UNIT: at 08:33

## 2015-12-05 RX ADMIN — Medication SCH MG: at 19:27

## 2015-12-05 RX ADMIN — Medication SCH EACH: at 08:34

## 2015-12-06 RX ADMIN — LEVOTHYROXINE SODIUM SCH MCG: 75 TABLET ORAL at 06:01

## 2015-12-06 RX ADMIN — Medication SCH EACH: at 19:56

## 2015-12-06 RX ADMIN — Medication SCH MG: at 19:56

## 2015-12-06 RX ADMIN — Medication SCH MG: at 08:52

## 2015-12-06 RX ADMIN — Medication SCH UNIT: at 08:52

## 2015-12-06 RX ADMIN — MAGNESIUM OXIDE TAB 400 MG (241.3 MG ELEMENTAL MG) SCH TAB: 400 (241.3 MG) TAB at 19:56

## 2015-12-06 RX ADMIN — Medication SCH EACH: at 19:55

## 2015-12-06 RX ADMIN — Medication SCH EACH: at 08:52

## 2015-12-07 RX ADMIN — MAGNESIUM OXIDE TAB 400 MG (241.3 MG ELEMENTAL MG) SCH TAB: 400 (241.3 MG) TAB at 19:36

## 2015-12-07 RX ADMIN — Medication SCH MG: at 08:59

## 2015-12-07 RX ADMIN — Medication SCH MG: at 19:37

## 2015-12-07 RX ADMIN — LEVOTHYROXINE SODIUM SCH MCG: 75 TABLET ORAL at 05:42

## 2015-12-07 RX ADMIN — Medication SCH EACH: at 19:36

## 2015-12-07 RX ADMIN — Medication SCH MG: at 19:38

## 2015-12-07 RX ADMIN — Medication SCH EACH: at 19:37

## 2015-12-07 RX ADMIN — Medication SCH UNIT: at 08:59

## 2015-12-07 RX ADMIN — Medication SCH EACH: at 09:00

## 2015-12-08 RX ADMIN — Medication SCH EACH: at 21:08

## 2015-12-08 RX ADMIN — Medication SCH MG: at 21:08

## 2015-12-08 RX ADMIN — Medication SCH UNIT: at 10:51

## 2015-12-08 RX ADMIN — LEVOTHYROXINE SODIUM SCH MCG: 75 TABLET ORAL at 05:47

## 2015-12-08 RX ADMIN — Medication SCH MG: at 10:52

## 2015-12-08 RX ADMIN — Medication SCH EACH: at 21:07

## 2015-12-08 RX ADMIN — Medication SCH EACH: at 10:52

## 2015-12-08 RX ADMIN — MAGNESIUM OXIDE TAB 400 MG (241.3 MG ELEMENTAL MG) SCH TAB: 400 (241.3 MG) TAB at 21:08

## 2015-12-09 RX ADMIN — Medication SCH EACH: at 19:17

## 2015-12-09 RX ADMIN — LEVOTHYROXINE SODIUM SCH MCG: 75 TABLET ORAL at 06:06

## 2015-12-09 RX ADMIN — Medication SCH MG: at 19:17

## 2015-12-09 RX ADMIN — Medication SCH MG: at 19:18

## 2015-12-09 RX ADMIN — Medication SCH UNIT: at 09:23

## 2015-12-09 RX ADMIN — MAGNESIUM OXIDE TAB 400 MG (241.3 MG ELEMENTAL MG) SCH TAB: 400 (241.3 MG) TAB at 19:18

## 2015-12-09 RX ADMIN — Medication SCH MG: at 09:23

## 2015-12-09 RX ADMIN — Medication SCH EACH: at 09:23

## 2015-12-10 RX ADMIN — Medication SCH EACH: at 10:04

## 2015-12-10 RX ADMIN — Medication SCH MG: at 10:04

## 2015-12-10 RX ADMIN — Medication SCH EACH: at 19:56

## 2015-12-10 RX ADMIN — LEVOTHYROXINE SODIUM SCH MCG: 75 TABLET ORAL at 06:27

## 2015-12-10 RX ADMIN — MAGNESIUM OXIDE TAB 400 MG (241.3 MG ELEMENTAL MG) SCH TAB: 400 (241.3 MG) TAB at 19:57

## 2015-12-10 RX ADMIN — Medication SCH UNIT: at 10:04

## 2015-12-10 RX ADMIN — Medication SCH MG: at 19:57

## 2015-12-11 RX ADMIN — Medication SCH UNIT: at 10:05

## 2015-12-11 RX ADMIN — MAGNESIUM OXIDE TAB 400 MG (241.3 MG ELEMENTAL MG) SCH TAB: 400 (241.3 MG) TAB at 20:57

## 2015-12-11 RX ADMIN — Medication SCH EACH: at 10:04

## 2015-12-11 RX ADMIN — Medication SCH MG: at 20:57

## 2015-12-11 RX ADMIN — Medication SCH EACH: at 20:57

## 2015-12-11 RX ADMIN — Medication SCH MG: at 10:05

## 2015-12-11 RX ADMIN — Medication SCH EACH: at 20:56

## 2015-12-11 RX ADMIN — LEVOTHYROXINE SODIUM SCH MCG: 75 TABLET ORAL at 06:07

## 2015-12-12 RX ADMIN — MAGNESIUM OXIDE TAB 400 MG (241.3 MG ELEMENTAL MG) SCH TAB: 400 (241.3 MG) TAB at 20:46

## 2015-12-12 RX ADMIN — Medication SCH MG: at 20:46

## 2015-12-12 RX ADMIN — Medication SCH EACH: at 09:20

## 2015-12-12 RX ADMIN — Medication SCH UNIT: at 09:21

## 2015-12-12 RX ADMIN — Medication SCH EACH: at 20:46

## 2015-12-12 RX ADMIN — Medication SCH MG: at 09:20

## 2015-12-13 RX ADMIN — Medication SCH EACH: at 21:08

## 2015-12-13 RX ADMIN — MAGNESIUM OXIDE TAB 400 MG (241.3 MG ELEMENTAL MG) SCH TAB: 400 (241.3 MG) TAB at 21:08

## 2015-12-13 RX ADMIN — Medication SCH MG: at 21:08

## 2015-12-13 RX ADMIN — LEVOTHYROXINE SODIUM SCH MCG: 75 TABLET ORAL at 06:00

## 2015-12-13 RX ADMIN — Medication SCH MG: at 10:31

## 2015-12-13 RX ADMIN — Medication SCH UNIT: at 10:30

## 2015-12-13 RX ADMIN — Medication SCH EACH: at 10:31

## 2015-12-14 RX ADMIN — LEVOTHYROXINE SODIUM SCH MCG: 75 TABLET ORAL at 05:52

## 2015-12-14 RX ADMIN — Medication SCH MG: at 09:17

## 2015-12-14 RX ADMIN — Medication SCH EACH: at 09:17

## 2015-12-14 RX ADMIN — Medication SCH UNIT: at 09:17

## 2015-12-14 RX ADMIN — Medication SCH MG: at 20:21

## 2015-12-14 RX ADMIN — MAGNESIUM OXIDE TAB 400 MG (241.3 MG ELEMENTAL MG) SCH TAB: 400 (241.3 MG) TAB at 20:21

## 2015-12-14 RX ADMIN — Medication SCH EACH: at 20:21

## 2015-12-15 RX ADMIN — MAGNESIUM OXIDE TAB 400 MG (241.3 MG ELEMENTAL MG) SCH TAB: 400 (241.3 MG) TAB at 20:24

## 2015-12-15 RX ADMIN — Medication SCH EACH: at 20:24

## 2015-12-15 RX ADMIN — Medication SCH MG: at 20:24

## 2015-12-15 RX ADMIN — Medication SCH MG: at 10:33

## 2015-12-15 RX ADMIN — Medication SCH EACH: at 10:34

## 2015-12-15 RX ADMIN — LEVOTHYROXINE SODIUM SCH MCG: 75 TABLET ORAL at 05:43

## 2015-12-15 RX ADMIN — Medication SCH EACH: at 20:23

## 2015-12-15 RX ADMIN — Medication SCH UNIT: at 10:32

## 2015-12-16 RX ADMIN — Medication SCH EACH: at 20:10

## 2015-12-16 RX ADMIN — Medication SCH UNIT: at 08:44

## 2015-12-16 RX ADMIN — Medication SCH MG: at 20:10

## 2015-12-16 RX ADMIN — Medication SCH EACH: at 08:44

## 2015-12-16 RX ADMIN — LEVOTHYROXINE SODIUM SCH MCG: 75 TABLET ORAL at 06:17

## 2015-12-16 RX ADMIN — Medication SCH MG: at 08:43

## 2015-12-16 RX ADMIN — Medication SCH MG: at 20:11

## 2015-12-16 RX ADMIN — MAGNESIUM OXIDE TAB 400 MG (241.3 MG ELEMENTAL MG) SCH TAB: 400 (241.3 MG) TAB at 20:10

## 2015-12-17 RX ADMIN — Medication SCH MG: at 09:10

## 2015-12-17 RX ADMIN — Medication SCH EACH: at 20:00

## 2015-12-17 RX ADMIN — Medication SCH EACH: at 09:10

## 2015-12-17 RX ADMIN — Medication SCH UNIT: at 09:09

## 2015-12-17 RX ADMIN — LEVOTHYROXINE SODIUM SCH MCG: 75 TABLET ORAL at 06:13

## 2015-12-17 RX ADMIN — Medication SCH MG: at 20:00

## 2015-12-17 RX ADMIN — MAGNESIUM OXIDE TAB 400 MG (241.3 MG ELEMENTAL MG) SCH TAB: 400 (241.3 MG) TAB at 20:00

## 2015-12-17 RX ADMIN — Medication SCH EACH: at 19:59

## 2015-12-18 RX ADMIN — Medication SCH MG: at 11:12

## 2015-12-18 RX ADMIN — MAGNESIUM OXIDE TAB 400 MG (241.3 MG ELEMENTAL MG) SCH TAB: 400 (241.3 MG) TAB at 20:34

## 2015-12-18 RX ADMIN — Medication SCH EACH: at 11:13

## 2015-12-18 RX ADMIN — Medication SCH EACH: at 20:34

## 2015-12-18 RX ADMIN — Medication SCH UNIT: at 11:13

## 2015-12-18 RX ADMIN — LEVOTHYROXINE SODIUM SCH MCG: 75 TABLET ORAL at 06:00

## 2015-12-18 RX ADMIN — Medication SCH MG: at 20:34

## 2015-12-18 RX ADMIN — Medication SCH: at 22:32

## 2015-12-19 RX ADMIN — MAGNESIUM OXIDE TAB 400 MG (241.3 MG ELEMENTAL MG) SCH TAB: 400 (241.3 MG) TAB at 21:26

## 2015-12-19 RX ADMIN — Medication SCH: at 09:41

## 2015-12-19 RX ADMIN — Medication SCH: at 21:28

## 2015-12-19 RX ADMIN — Medication SCH EACH: at 09:40

## 2015-12-19 RX ADMIN — Medication SCH EACH: at 21:25

## 2015-12-19 RX ADMIN — Medication SCH UNIT: at 09:39

## 2015-12-19 RX ADMIN — Medication SCH EACH: at 21:27

## 2015-12-19 RX ADMIN — Medication SCH MG: at 21:26

## 2015-12-20 RX ADMIN — LEVOTHYROXINE SODIUM SCH MCG: 75 TABLET ORAL at 06:32

## 2015-12-20 RX ADMIN — Medication SCH: at 10:58

## 2015-12-20 RX ADMIN — Medication SCH: at 21:15

## 2015-12-20 RX ADMIN — Medication SCH EACH: at 10:58

## 2015-12-20 RX ADMIN — MAGNESIUM OXIDE TAB 400 MG (241.3 MG ELEMENTAL MG) SCH TAB: 400 (241.3 MG) TAB at 21:15

## 2015-12-20 RX ADMIN — Medication SCH UNIT: at 10:58

## 2015-12-20 RX ADMIN — Medication SCH MG: at 21:13

## 2015-12-20 RX ADMIN — Medication SCH EACH: at 21:13

## 2015-12-20 RX ADMIN — Medication SCH EACH: at 21:14

## 2015-12-21 RX ADMIN — Medication SCH MG: at 21:24

## 2015-12-21 RX ADMIN — Medication SCH MG: at 21:25

## 2015-12-21 RX ADMIN — Medication SCH EACH: at 09:22

## 2015-12-21 RX ADMIN — Medication SCH: at 09:23

## 2015-12-21 RX ADMIN — Medication SCH EACH: at 21:25

## 2015-12-21 RX ADMIN — LEVOTHYROXINE SODIUM SCH MCG: 75 TABLET ORAL at 06:25

## 2015-12-21 RX ADMIN — Medication SCH EACH: at 21:26

## 2015-12-21 RX ADMIN — MAGNESIUM OXIDE TAB 400 MG (241.3 MG ELEMENTAL MG) SCH TAB: 400 (241.3 MG) TAB at 21:25

## 2015-12-21 RX ADMIN — Medication SCH UNIT: at 09:22

## 2015-12-22 RX ADMIN — LEVOTHYROXINE SODIUM SCH MCG: 75 TABLET ORAL at 06:37

## 2015-12-22 RX ADMIN — Medication SCH MG: at 20:21

## 2015-12-22 RX ADMIN — Medication SCH EACH: at 08:41

## 2015-12-22 RX ADMIN — Medication SCH MG: at 20:22

## 2015-12-22 RX ADMIN — Medication SCH MG: at 08:41

## 2015-12-22 RX ADMIN — Medication SCH EACH: at 20:22

## 2015-12-22 RX ADMIN — Medication SCH UNIT: at 08:40

## 2015-12-22 RX ADMIN — MAGNESIUM OXIDE TAB 400 MG (241.3 MG ELEMENTAL MG) SCH TAB: 400 (241.3 MG) TAB at 20:21

## 2015-12-23 RX ADMIN — LEVOTHYROXINE SODIUM SCH MCG: 75 TABLET ORAL at 06:26

## 2015-12-23 RX ADMIN — Medication SCH UNIT: at 08:38

## 2015-12-23 RX ADMIN — Medication SCH MG: at 22:45

## 2015-12-23 RX ADMIN — Medication SCH MG: at 08:38

## 2015-12-23 RX ADMIN — Medication SCH MG: at 22:46

## 2015-12-23 RX ADMIN — Medication SCH EACH: at 22:45

## 2015-12-23 RX ADMIN — MAGNESIUM OXIDE TAB 400 MG (241.3 MG ELEMENTAL MG) SCH TAB: 400 (241.3 MG) TAB at 22:47

## 2015-12-23 RX ADMIN — Medication SCH EACH: at 08:37

## 2015-12-23 RX ADMIN — Medication SCH EACH: at 22:46

## 2015-12-24 RX ADMIN — Medication SCH EACH: at 09:10

## 2015-12-24 RX ADMIN — Medication SCH MG: at 09:09

## 2015-12-24 RX ADMIN — Medication SCH EACH: at 20:05

## 2015-12-24 RX ADMIN — Medication SCH MG: at 20:05

## 2015-12-24 RX ADMIN — Medication SCH UNIT: at 09:09

## 2015-12-24 RX ADMIN — Medication SCH EACH: at 20:07

## 2015-12-24 RX ADMIN — MAGNESIUM OXIDE TAB 400 MG (241.3 MG ELEMENTAL MG) SCH TAB: 400 (241.3 MG) TAB at 20:06

## 2015-12-24 RX ADMIN — Medication SCH MG: at 20:07

## 2015-12-24 RX ADMIN — LEVOTHYROXINE SODIUM SCH MCG: 75 TABLET ORAL at 09:09

## 2015-12-25 RX ADMIN — Medication SCH EACH: at 19:44

## 2015-12-25 RX ADMIN — Medication SCH MG: at 09:58

## 2015-12-25 RX ADMIN — Medication SCH UNIT: at 09:58

## 2015-12-25 RX ADMIN — Medication SCH MG: at 19:44

## 2015-12-25 RX ADMIN — LEVOTHYROXINE SODIUM SCH MCG: 75 TABLET ORAL at 06:25

## 2015-12-25 RX ADMIN — MAGNESIUM OXIDE TAB 400 MG (241.3 MG ELEMENTAL MG) SCH TAB: 400 (241.3 MG) TAB at 19:44

## 2015-12-25 RX ADMIN — Medication SCH EACH: at 09:58

## 2015-12-26 RX ADMIN — MAGNESIUM OXIDE TAB 400 MG (241.3 MG ELEMENTAL MG) SCH TAB: 400 (241.3 MG) TAB at 20:05

## 2015-12-26 RX ADMIN — Medication SCH MG: at 20:06

## 2015-12-26 RX ADMIN — Medication SCH EACH: at 08:58

## 2015-12-26 RX ADMIN — Medication SCH MG: at 20:05

## 2015-12-26 RX ADMIN — Medication SCH MG: at 08:59

## 2015-12-26 RX ADMIN — Medication SCH UNIT: at 08:59

## 2015-12-26 RX ADMIN — Medication SCH EACH: at 20:06

## 2015-12-26 RX ADMIN — Medication SCH EACH: at 20:04

## 2015-12-27 RX ADMIN — Medication SCH EACH: at 19:24

## 2015-12-27 RX ADMIN — LEVOTHYROXINE SODIUM SCH MCG: 75 TABLET ORAL at 06:12

## 2015-12-27 RX ADMIN — Medication SCH EACH: at 19:25

## 2015-12-27 RX ADMIN — Medication SCH UNIT: at 08:31

## 2015-12-27 RX ADMIN — Medication SCH EACH: at 08:31

## 2015-12-27 RX ADMIN — Medication SCH MG: at 19:24

## 2015-12-27 RX ADMIN — Medication SCH MG: at 08:31

## 2015-12-27 RX ADMIN — MAGNESIUM OXIDE TAB 400 MG (241.3 MG ELEMENTAL MG) SCH TAB: 400 (241.3 MG) TAB at 19:25

## 2015-12-28 RX ADMIN — Medication SCH MG: at 20:08

## 2015-12-28 RX ADMIN — Medication SCH EACH: at 08:49

## 2015-12-28 RX ADMIN — Medication SCH EACH: at 20:06

## 2015-12-28 RX ADMIN — Medication SCH UNIT: at 08:49

## 2015-12-28 RX ADMIN — Medication SCH MG: at 08:49

## 2015-12-28 RX ADMIN — LEVOTHYROXINE SODIUM SCH MCG: 75 TABLET ORAL at 06:25

## 2015-12-28 RX ADMIN — MAGNESIUM OXIDE TAB 400 MG (241.3 MG ELEMENTAL MG) SCH TAB: 400 (241.3 MG) TAB at 20:07

## 2015-12-28 RX ADMIN — Medication SCH MG: at 20:07

## 2015-12-29 RX ADMIN — Medication SCH UNIT: at 09:50

## 2015-12-29 RX ADMIN — LEVOTHYROXINE SODIUM SCH MCG: 75 TABLET ORAL at 05:45

## 2015-12-29 RX ADMIN — Medication SCH MG: at 20:04

## 2015-12-29 RX ADMIN — Medication SCH EACH: at 20:03

## 2015-12-29 RX ADMIN — Medication SCH MG: at 09:50

## 2015-12-29 RX ADMIN — Medication SCH EACH: at 09:51

## 2015-12-29 RX ADMIN — MAGNESIUM OXIDE TAB 400 MG (241.3 MG ELEMENTAL MG) SCH TAB: 400 (241.3 MG) TAB at 20:04

## 2015-12-30 RX ADMIN — Medication SCH EACH: at 19:47

## 2015-12-30 RX ADMIN — LEVOTHYROXINE SODIUM SCH MCG: 75 TABLET ORAL at 05:43

## 2015-12-30 RX ADMIN — Medication SCH UNIT: at 09:16

## 2015-12-30 RX ADMIN — Medication SCH EACH: at 09:17

## 2015-12-30 RX ADMIN — Medication SCH MG: at 19:47

## 2015-12-30 RX ADMIN — Medication SCH MG: at 09:16

## 2015-12-30 RX ADMIN — Medication SCH EACH: at 19:46

## 2015-12-30 RX ADMIN — MAGNESIUM OXIDE TAB 400 MG (241.3 MG ELEMENTAL MG) SCH TAB: 400 (241.3 MG) TAB at 19:48

## 2015-12-30 RX ADMIN — Medication SCH MG: at 19:48

## 2015-12-31 RX ADMIN — LEVOTHYROXINE SODIUM SCH MCG: 75 TABLET ORAL at 06:14

## 2015-12-31 RX ADMIN — Medication SCH EACH: at 18:52

## 2015-12-31 RX ADMIN — Medication SCH EACH: at 10:03

## 2015-12-31 RX ADMIN — Medication SCH MG: at 18:53

## 2015-12-31 RX ADMIN — MAGNESIUM OXIDE TAB 400 MG (241.3 MG ELEMENTAL MG) SCH TAB: 400 (241.3 MG) TAB at 18:53

## 2015-12-31 RX ADMIN — Medication SCH UNIT: at 10:01

## 2015-12-31 RX ADMIN — Medication SCH EACH: at 18:53

## 2015-12-31 RX ADMIN — Medication SCH MG: at 10:00

## 2015-12-31 RX ADMIN — Medication SCH MG: at 18:52

## 2016-01-01 RX ADMIN — MAGNESIUM OXIDE TAB 400 MG (241.3 MG ELEMENTAL MG) SCH TAB: 400 (241.3 MG) TAB at 21:30

## 2016-01-01 RX ADMIN — Medication SCH EACH: at 21:29

## 2016-01-01 RX ADMIN — Medication SCH UNIT: at 09:12

## 2016-01-01 RX ADMIN — Medication SCH MG: at 09:12

## 2016-01-01 RX ADMIN — Medication SCH MG: at 21:29

## 2016-01-01 RX ADMIN — LEVOTHYROXINE SODIUM SCH MCG: 75 TABLET ORAL at 06:59

## 2016-01-01 RX ADMIN — Medication SCH MG: at 21:30

## 2016-01-01 RX ADMIN — Medication SCH EACH: at 09:12

## 2016-01-02 RX ADMIN — Medication SCH EACH: at 21:10

## 2016-01-02 RX ADMIN — Medication SCH MG: at 21:10

## 2016-01-02 RX ADMIN — Medication SCH MG: at 21:11

## 2016-01-02 RX ADMIN — MAGNESIUM OXIDE TAB 400 MG (241.3 MG ELEMENTAL MG) SCH TAB: 400 (241.3 MG) TAB at 21:11

## 2016-01-02 RX ADMIN — Medication SCH EACH: at 21:12

## 2016-01-02 RX ADMIN — Medication SCH MG: at 10:16

## 2016-01-02 RX ADMIN — Medication SCH UNIT: at 10:15

## 2016-01-02 RX ADMIN — Medication SCH EACH: at 10:15

## 2016-01-03 RX ADMIN — Medication SCH EACH: at 20:36

## 2016-01-03 RX ADMIN — Medication SCH MG: at 20:36

## 2016-01-03 RX ADMIN — MAGNESIUM OXIDE TAB 400 MG (241.3 MG ELEMENTAL MG) SCH TAB: 400 (241.3 MG) TAB at 20:37

## 2016-01-03 RX ADMIN — Medication SCH EACH: at 09:32

## 2016-01-03 RX ADMIN — Medication SCH MG: at 09:32

## 2016-01-03 RX ADMIN — Medication SCH MG: at 20:37

## 2016-01-03 RX ADMIN — Medication SCH EACH: at 20:37

## 2016-01-03 RX ADMIN — LEVOTHYROXINE SODIUM SCH MCG: 75 TABLET ORAL at 09:32

## 2016-01-03 RX ADMIN — Medication SCH UNIT: at 09:32

## 2016-01-04 RX ADMIN — Medication SCH MG: at 09:27

## 2016-01-04 RX ADMIN — Medication SCH EACH: at 09:26

## 2016-01-04 RX ADMIN — Medication SCH UNIT: at 09:26

## 2016-01-04 RX ADMIN — MAGNESIUM OXIDE TAB 400 MG (241.3 MG ELEMENTAL MG) SCH TAB: 400 (241.3 MG) TAB at 19:32

## 2016-01-04 RX ADMIN — Medication SCH MG: at 19:32

## 2016-01-04 RX ADMIN — Medication SCH EACH: at 19:32

## 2016-01-04 RX ADMIN — LEVOTHYROXINE SODIUM SCH MCG: 75 TABLET ORAL at 05:50

## 2016-01-04 RX ADMIN — Medication SCH EACH: at 19:31

## 2016-01-04 NOTE — PN
Progress Note for JENIFER SHEETS  Date:  01/04/2016  Room #:  VM.207

 

SUBJECTIVE:  This is an 86-year-old seen for followup on swing bed.  She has

been at our hospital since October.  She is doing fine with no concerns other

than some itchy spots on her hands.  She has been using Neosporin.  She has

never had skin cancer.  It looks like actinic keratosis.  She has had some for

years and just got a few more recently.  She is having some more trouble with

arthritis in her legs, getting up and walking today.

 

OBJECTIVE:  Vital Signs:  Otherwise, her temperature is 97.3, pulse 60, blood

pressure 114/60, respiratory rate 16, O2 97% on room air.

General:  She is in no acute distress.

Heart:  Irregularly irregular.

Lungs:  Sounds are clear to auscultation bilaterally without crackles or

wheezes.

Extremities:  Warm and dry.  No edema.

Mental Status:  She is alert and orientated x3.

Skin:  Shows some fine, scaly patches to both hands and consistent with actinic

keratoses.

 

LABORATORY DATA:  Showed a TSH normal at 2.36 last month.

 

ASSESSMENT:

1. Hypothyroidism, adequately treated.

2. Atrial fibrillation with previous transient ischemic attack, on

    anticoagulation.

3. Chronic leg weakness.

4. Hypertension.

5. Generalized osteoarthritis.

6. Actinic keratosis.

 

PLAN:  The patient will continue swing bed cares for assistance with ADLs.  No

lab work is due.  If the actinic keratoses continue, we will bring her over to

the clinic for liquid nitrogen treatment.

 

 

MKA:  01/04/2016 13:07:24  MODL:  01/04/2016 13:22:30

Job #:  [id number]

## 2016-01-05 RX ADMIN — Medication SCH MG: at 19:32

## 2016-01-05 RX ADMIN — MAGNESIUM OXIDE TAB 400 MG (241.3 MG ELEMENTAL MG) SCH TAB: 400 (241.3 MG) TAB at 19:32

## 2016-01-05 RX ADMIN — Medication SCH EACH: at 19:31

## 2016-01-05 RX ADMIN — Medication SCH EACH: at 08:43

## 2016-01-05 RX ADMIN — LEVOTHYROXINE SODIUM SCH MCG: 75 TABLET ORAL at 05:56

## 2016-01-05 RX ADMIN — Medication SCH UNIT: at 08:41

## 2016-01-05 RX ADMIN — Medication SCH MG: at 08:42

## 2016-01-06 RX ADMIN — MAGNESIUM OXIDE TAB 400 MG (241.3 MG ELEMENTAL MG) SCH TAB: 400 (241.3 MG) TAB at 21:04

## 2016-01-06 RX ADMIN — Medication SCH MG: at 21:03

## 2016-01-06 RX ADMIN — Medication SCH UNIT: at 09:09

## 2016-01-06 RX ADMIN — Medication SCH MG: at 21:04

## 2016-01-06 RX ADMIN — LEVOTHYROXINE SODIUM SCH MCG: 75 TABLET ORAL at 06:20

## 2016-01-06 RX ADMIN — Medication SCH EACH: at 21:04

## 2016-01-06 RX ADMIN — Medication SCH EACH: at 09:09

## 2016-01-06 RX ADMIN — Medication SCH MG: at 09:09

## 2016-01-06 RX ADMIN — Medication SCH EACH: at 21:03

## 2016-01-07 RX ADMIN — Medication SCH EACH: at 09:10

## 2016-01-07 RX ADMIN — MAGNESIUM OXIDE TAB 400 MG (241.3 MG ELEMENTAL MG) SCH TAB: 400 (241.3 MG) TAB at 20:32

## 2016-01-07 RX ADMIN — Medication SCH MG: at 09:11

## 2016-01-07 RX ADMIN — Medication SCH EACH: at 20:33

## 2016-01-07 RX ADMIN — Medication SCH MG: at 20:32

## 2016-01-07 RX ADMIN — LEVOTHYROXINE SODIUM SCH MCG: 75 TABLET ORAL at 06:48

## 2016-01-07 RX ADMIN — Medication SCH UNIT: at 09:11

## 2016-01-08 RX ADMIN — Medication SCH MG: at 10:13

## 2016-01-08 RX ADMIN — LEVOTHYROXINE SODIUM SCH MCG: 75 TABLET ORAL at 07:55

## 2016-01-08 RX ADMIN — Medication SCH EACH: at 19:54

## 2016-01-08 RX ADMIN — Medication SCH MG: at 19:54

## 2016-01-08 RX ADMIN — Medication SCH UNIT: at 10:13

## 2016-01-08 RX ADMIN — Medication SCH EACH: at 19:53

## 2016-01-08 RX ADMIN — Medication SCH EACH: at 10:15

## 2016-01-08 RX ADMIN — MAGNESIUM OXIDE TAB 400 MG (241.3 MG ELEMENTAL MG) SCH TAB: 400 (241.3 MG) TAB at 19:54

## 2016-01-09 RX ADMIN — Medication SCH EACH: at 19:57

## 2016-01-09 RX ADMIN — Medication SCH MG: at 19:58

## 2016-01-09 RX ADMIN — Medication SCH MG: at 09:19

## 2016-01-09 RX ADMIN — Medication SCH UNIT: at 09:19

## 2016-01-09 RX ADMIN — Medication SCH EACH: at 09:19

## 2016-01-09 RX ADMIN — MAGNESIUM OXIDE TAB 400 MG (241.3 MG ELEMENTAL MG) SCH TAB: 400 (241.3 MG) TAB at 19:58

## 2016-01-10 RX ADMIN — Medication SCH MG: at 20:13

## 2016-01-10 RX ADMIN — Medication SCH UNIT: at 09:10

## 2016-01-10 RX ADMIN — Medication SCH EACH: at 09:07

## 2016-01-10 RX ADMIN — Medication SCH MG: at 20:14

## 2016-01-10 RX ADMIN — MAGNESIUM OXIDE TAB 400 MG (241.3 MG ELEMENTAL MG) SCH TAB: 400 (241.3 MG) TAB at 20:13

## 2016-01-10 RX ADMIN — LEVOTHYROXINE SODIUM SCH MCG: 75 TABLET ORAL at 06:03

## 2016-01-10 RX ADMIN — Medication SCH EACH: at 20:13

## 2016-01-10 RX ADMIN — Medication SCH MG: at 09:08

## 2016-01-11 RX ADMIN — Medication SCH EACH: at 21:06

## 2016-01-11 RX ADMIN — Medication SCH EACH: at 21:08

## 2016-01-11 RX ADMIN — Medication SCH EACH: at 08:42

## 2016-01-11 RX ADMIN — Medication SCH UNIT: at 08:43

## 2016-01-11 RX ADMIN — Medication SCH MG: at 21:04

## 2016-01-11 RX ADMIN — MAGNESIUM OXIDE TAB 400 MG (241.3 MG ELEMENTAL MG) SCH: 400 (241.3 MG) TAB at 21:03

## 2016-01-11 RX ADMIN — LEVOTHYROXINE SODIUM SCH MCG: 75 TABLET ORAL at 05:52

## 2016-01-11 RX ADMIN — Medication SCH MG: at 21:07

## 2016-01-11 RX ADMIN — Medication SCH MG: at 08:43

## 2016-01-12 RX ADMIN — Medication SCH MG: at 07:24

## 2016-01-12 RX ADMIN — LEVOTHYROXINE SODIUM SCH MCG: 75 TABLET ORAL at 07:24

## 2016-01-12 RX ADMIN — MAGNESIUM OXIDE TAB 400 MG (241.3 MG ELEMENTAL MG) SCH TAB: 400 (241.3 MG) TAB at 20:36

## 2016-01-12 RX ADMIN — Medication SCH MG: at 20:36

## 2016-01-12 RX ADMIN — Medication SCH EACH: at 07:26

## 2016-01-12 RX ADMIN — Medication SCH UNIT: at 07:25

## 2016-01-12 RX ADMIN — Medication SCH EACH: at 20:36

## 2016-01-13 RX ADMIN — LEVOTHYROXINE SODIUM SCH: 75 TABLET ORAL at 07:27

## 2016-01-13 RX ADMIN — Medication SCH EACH: at 08:45

## 2016-01-13 RX ADMIN — Medication SCH EACH: at 20:48

## 2016-01-13 RX ADMIN — MAGNESIUM OXIDE TAB 400 MG (241.3 MG ELEMENTAL MG) SCH TAB: 400 (241.3 MG) TAB at 20:48

## 2016-01-13 RX ADMIN — Medication SCH UNIT: at 08:45

## 2016-01-13 RX ADMIN — Medication SCH EACH: at 20:47

## 2016-01-13 RX ADMIN — Medication SCH MG: at 08:45

## 2016-01-13 RX ADMIN — Medication SCH MG: at 20:47

## 2016-01-14 RX ADMIN — LEVOTHYROXINE SODIUM SCH MCG: 75 TABLET ORAL at 06:36

## 2016-01-14 RX ADMIN — Medication SCH MG: at 09:25

## 2016-01-14 RX ADMIN — Medication SCH EACH: at 20:58

## 2016-01-14 RX ADMIN — Medication SCH EACH: at 09:26

## 2016-01-14 RX ADMIN — Medication SCH UNIT: at 09:25

## 2016-01-14 RX ADMIN — Medication SCH EACH: at 20:59

## 2016-01-14 RX ADMIN — Medication SCH MG: at 20:59

## 2016-01-14 RX ADMIN — MAGNESIUM OXIDE TAB 400 MG (241.3 MG ELEMENTAL MG) SCH TAB: 400 (241.3 MG) TAB at 20:59

## 2016-01-15 RX ADMIN — Medication SCH MG: at 09:01

## 2016-01-15 RX ADMIN — LEVOTHYROXINE SODIUM SCH MCG: 75 TABLET ORAL at 06:26

## 2016-01-15 RX ADMIN — MAGNESIUM OXIDE TAB 400 MG (241.3 MG ELEMENTAL MG) SCH TAB: 400 (241.3 MG) TAB at 20:11

## 2016-01-15 RX ADMIN — Medication SCH EACH: at 20:13

## 2016-01-15 RX ADMIN — Medication SCH MG: at 20:11

## 2016-01-15 RX ADMIN — Medication SCH EACH: at 09:01

## 2016-01-15 RX ADMIN — Medication SCH UNIT: at 09:01

## 2016-01-15 RX ADMIN — Medication SCH EACH: at 20:11

## 2016-01-16 RX ADMIN — MAGNESIUM OXIDE TAB 400 MG (241.3 MG ELEMENTAL MG) SCH TAB: 400 (241.3 MG) TAB at 20:31

## 2016-01-16 RX ADMIN — Medication SCH EACH: at 20:32

## 2016-01-16 RX ADMIN — Medication SCH MG: at 09:24

## 2016-01-16 RX ADMIN — Medication SCH EACH: at 20:31

## 2016-01-16 RX ADMIN — Medication SCH MG: at 20:31

## 2016-01-16 RX ADMIN — Medication SCH MG: at 20:32

## 2016-01-16 RX ADMIN — Medication SCH UNIT: at 09:23

## 2016-01-16 RX ADMIN — Medication SCH EACH: at 09:22

## 2016-01-17 RX ADMIN — Medication SCH MG: at 21:00

## 2016-01-17 RX ADMIN — LEVOTHYROXINE SODIUM SCH MCG: 75 TABLET ORAL at 05:21

## 2016-01-17 RX ADMIN — Medication SCH MG: at 07:46

## 2016-01-17 RX ADMIN — Medication SCH EACH: at 21:00

## 2016-01-17 RX ADMIN — Medication SCH UNIT: at 07:46

## 2016-01-17 RX ADMIN — MAGNESIUM OXIDE TAB 400 MG (241.3 MG ELEMENTAL MG) SCH TAB: 400 (241.3 MG) TAB at 21:00

## 2016-01-17 RX ADMIN — Medication SCH EACH: at 07:45

## 2016-01-17 RX ADMIN — LEVOTHYROXINE SODIUM SCH: 75 TABLET ORAL at 05:21

## 2016-01-18 RX ADMIN — Medication SCH MG: at 21:43

## 2016-01-18 RX ADMIN — LEVOTHYROXINE SODIUM SCH MCG: 75 TABLET ORAL at 06:50

## 2016-01-18 RX ADMIN — Medication SCH EACH: at 21:42

## 2016-01-18 RX ADMIN — Medication SCH EACH: at 09:29

## 2016-01-18 RX ADMIN — Medication SCH MG: at 09:29

## 2016-01-18 RX ADMIN — Medication SCH EACH: at 21:43

## 2016-01-18 RX ADMIN — MAGNESIUM OXIDE TAB 400 MG (241.3 MG ELEMENTAL MG) SCH TAB: 400 (241.3 MG) TAB at 21:44

## 2016-01-18 RX ADMIN — Medication SCH UNIT: at 09:30

## 2016-01-19 RX ADMIN — LEVOTHYROXINE SODIUM SCH MCG: 75 TABLET ORAL at 08:47

## 2016-01-19 RX ADMIN — Medication SCH UNIT: at 09:36

## 2016-01-19 RX ADMIN — Medication SCH EACH: at 21:45

## 2016-01-19 RX ADMIN — Medication SCH MG: at 21:45

## 2016-01-19 RX ADMIN — Medication SCH MG: at 21:44

## 2016-01-19 RX ADMIN — Medication SCH MG: at 09:37

## 2016-01-19 RX ADMIN — Medication SCH EACH: at 21:43

## 2016-01-19 RX ADMIN — Medication SCH EACH: at 09:37

## 2016-01-19 RX ADMIN — MAGNESIUM OXIDE TAB 400 MG (241.3 MG ELEMENTAL MG) SCH TAB: 400 (241.3 MG) TAB at 21:44

## 2016-01-20 RX ADMIN — Medication SCH MG: at 21:09

## 2016-01-20 RX ADMIN — Medication SCH EACH: at 21:08

## 2016-01-20 RX ADMIN — Medication SCH MG: at 09:16

## 2016-01-20 RX ADMIN — Medication SCH EACH: at 09:16

## 2016-01-20 RX ADMIN — LEVOTHYROXINE SODIUM SCH MCG: 75 TABLET ORAL at 09:16

## 2016-01-20 RX ADMIN — Medication SCH UNIT: at 09:16

## 2016-01-20 RX ADMIN — MAGNESIUM OXIDE TAB 400 MG (241.3 MG ELEMENTAL MG) SCH TAB: 400 (241.3 MG) TAB at 21:08

## 2016-01-21 RX ADMIN — Medication SCH MG: at 09:57

## 2016-01-21 RX ADMIN — LEVOTHYROXINE SODIUM SCH MCG: 75 TABLET ORAL at 05:40

## 2016-01-21 RX ADMIN — LEVOTHYROXINE SODIUM SCH: 75 TABLET ORAL at 07:30

## 2016-01-21 RX ADMIN — MAGNESIUM OXIDE TAB 400 MG (241.3 MG ELEMENTAL MG) SCH TAB: 400 (241.3 MG) TAB at 19:32

## 2016-01-21 RX ADMIN — Medication SCH MG: at 19:32

## 2016-01-21 RX ADMIN — Medication SCH MG: at 19:31

## 2016-01-21 RX ADMIN — Medication SCH UNIT: at 09:57

## 2016-01-21 RX ADMIN — Medication SCH EACH: at 19:30

## 2016-01-21 RX ADMIN — Medication SCH EACH: at 09:56

## 2016-01-22 RX ADMIN — Medication SCH EACH: at 20:02

## 2016-01-22 RX ADMIN — LEVOTHYROXINE SODIUM SCH MCG: 75 TABLET ORAL at 06:04

## 2016-01-22 RX ADMIN — Medication SCH UNIT: at 08:11

## 2016-01-22 RX ADMIN — Medication SCH MG: at 08:11

## 2016-01-22 RX ADMIN — Medication SCH EACH: at 20:03

## 2016-01-22 RX ADMIN — Medication SCH MG: at 20:02

## 2016-01-22 RX ADMIN — MAGNESIUM OXIDE TAB 400 MG (241.3 MG ELEMENTAL MG) SCH TAB: 400 (241.3 MG) TAB at 20:02

## 2016-01-22 RX ADMIN — Medication SCH EACH: at 08:11

## 2016-01-23 RX ADMIN — Medication SCH MG: at 20:38

## 2016-01-23 RX ADMIN — Medication SCH UNIT: at 09:26

## 2016-01-23 RX ADMIN — MAGNESIUM OXIDE TAB 400 MG (241.3 MG ELEMENTAL MG) SCH TAB: 400 (241.3 MG) TAB at 20:39

## 2016-01-23 RX ADMIN — Medication SCH MG: at 20:39

## 2016-01-23 RX ADMIN — Medication SCH EACH: at 20:39

## 2016-01-23 RX ADMIN — Medication SCH EACH: at 09:25

## 2016-01-23 RX ADMIN — Medication SCH MG: at 09:25

## 2016-01-24 RX ADMIN — Medication SCH MG: at 09:42

## 2016-01-24 RX ADMIN — Medication SCH MG: at 20:31

## 2016-01-24 RX ADMIN — Medication SCH UNIT: at 09:41

## 2016-01-24 RX ADMIN — Medication SCH EACH: at 20:30

## 2016-01-24 RX ADMIN — MAGNESIUM OXIDE TAB 400 MG (241.3 MG ELEMENTAL MG) SCH TAB: 400 (241.3 MG) TAB at 20:31

## 2016-01-24 RX ADMIN — Medication SCH EACH: at 20:32

## 2016-01-24 RX ADMIN — Medication SCH MG: at 20:30

## 2016-01-24 RX ADMIN — Medication SCH EACH: at 09:41

## 2016-01-24 RX ADMIN — LEVOTHYROXINE SODIUM SCH MCG: 75 TABLET ORAL at 07:21

## 2016-01-25 RX ADMIN — Medication SCH MG: at 21:33

## 2016-01-25 RX ADMIN — Medication SCH EACH: at 10:20

## 2016-01-25 RX ADMIN — Medication SCH UNIT: at 10:20

## 2016-01-25 RX ADMIN — LEVOTHYROXINE SODIUM SCH MCG: 75 TABLET ORAL at 07:03

## 2016-01-25 RX ADMIN — MAGNESIUM OXIDE TAB 400 MG (241.3 MG ELEMENTAL MG) SCH TAB: 400 (241.3 MG) TAB at 21:32

## 2016-01-25 RX ADMIN — Medication SCH EACH: at 21:32

## 2016-01-25 RX ADMIN — Medication SCH MG: at 21:32

## 2016-01-25 RX ADMIN — Medication SCH EACH: at 21:31

## 2016-01-25 RX ADMIN — Medication SCH MG: at 10:20

## 2016-01-26 RX ADMIN — LEVOTHYROXINE SODIUM SCH MCG: 75 TABLET ORAL at 06:39

## 2016-01-26 RX ADMIN — Medication SCH: at 09:36

## 2016-01-26 RX ADMIN — Medication SCH EACH: at 20:46

## 2016-01-26 RX ADMIN — Medication SCH MG: at 09:37

## 2016-01-26 RX ADMIN — Medication SCH EACH: at 20:47

## 2016-01-26 RX ADMIN — MAGNESIUM OXIDE TAB 400 MG (241.3 MG ELEMENTAL MG) SCH TAB: 400 (241.3 MG) TAB at 20:47

## 2016-01-26 RX ADMIN — Medication SCH MG: at 20:47

## 2016-01-26 RX ADMIN — Medication SCH EACH: at 09:34

## 2016-01-26 RX ADMIN — Medication SCH UNIT: at 09:33

## 2016-01-27 RX ADMIN — Medication SCH MG: at 20:47

## 2016-01-27 RX ADMIN — Medication SCH EACH: at 09:45

## 2016-01-27 RX ADMIN — Medication SCH EACH: at 20:46

## 2016-01-27 RX ADMIN — Medication SCH MG: at 09:45

## 2016-01-27 RX ADMIN — Medication SCH UNIT: at 09:45

## 2016-01-27 RX ADMIN — MAGNESIUM OXIDE TAB 400 MG (241.3 MG ELEMENTAL MG) SCH TAB: 400 (241.3 MG) TAB at 20:47

## 2016-01-27 RX ADMIN — Medication SCH EACH: at 20:47

## 2016-01-27 RX ADMIN — Medication SCH MG: at 20:48

## 2016-01-27 RX ADMIN — LEVOTHYROXINE SODIUM SCH MCG: 75 TABLET ORAL at 06:44

## 2016-01-28 RX ADMIN — Medication SCH EACH: at 08:58

## 2016-01-28 RX ADMIN — Medication SCH MG: at 09:00

## 2016-01-28 RX ADMIN — MAGNESIUM OXIDE TAB 400 MG (241.3 MG ELEMENTAL MG) SCH TAB: 400 (241.3 MG) TAB at 19:54

## 2016-01-28 RX ADMIN — Medication SCH EACH: at 19:54

## 2016-01-28 RX ADMIN — Medication SCH MG: at 19:54

## 2016-01-28 RX ADMIN — LEVOTHYROXINE SODIUM SCH MCG: 75 TABLET ORAL at 06:59

## 2016-01-28 RX ADMIN — Medication SCH UNIT: at 09:01

## 2016-01-29 RX ADMIN — Medication SCH EACH: at 20:33

## 2016-01-29 RX ADMIN — Medication SCH UNIT: at 09:49

## 2016-01-29 RX ADMIN — MAGNESIUM OXIDE TAB 400 MG (241.3 MG ELEMENTAL MG) SCH TAB: 400 (241.3 MG) TAB at 20:32

## 2016-01-29 RX ADMIN — Medication SCH MG: at 09:48

## 2016-01-29 RX ADMIN — Medication SCH MG: at 20:32

## 2016-01-29 RX ADMIN — LEVOTHYROXINE SODIUM SCH MCG: 75 TABLET ORAL at 06:12

## 2016-01-29 RX ADMIN — Medication SCH EACH: at 09:48

## 2016-01-29 RX ADMIN — Medication SCH MG: at 20:33

## 2016-01-30 RX ADMIN — Medication SCH MG: at 09:22

## 2016-01-30 RX ADMIN — Medication SCH EACH: at 20:22

## 2016-01-30 RX ADMIN — Medication SCH EACH: at 09:22

## 2016-01-30 RX ADMIN — Medication SCH UNIT: at 09:22

## 2016-01-30 RX ADMIN — Medication SCH MG: at 20:23

## 2016-01-30 RX ADMIN — Medication SCH MG: at 20:22

## 2016-01-30 RX ADMIN — MAGNESIUM OXIDE TAB 400 MG (241.3 MG ELEMENTAL MG) SCH TAB: 400 (241.3 MG) TAB at 20:23

## 2016-01-30 RX ADMIN — Medication SCH EACH: at 20:23

## 2016-01-31 RX ADMIN — MAGNESIUM OXIDE TAB 400 MG (241.3 MG ELEMENTAL MG) SCH TAB: 400 (241.3 MG) TAB at 19:39

## 2016-01-31 RX ADMIN — Medication SCH UNIT: at 08:31

## 2016-01-31 RX ADMIN — Medication SCH MG: at 08:31

## 2016-01-31 RX ADMIN — Medication SCH EACH: at 08:31

## 2016-01-31 RX ADMIN — Medication SCH MG: at 19:38

## 2016-01-31 RX ADMIN — Medication SCH EACH: at 19:39

## 2016-01-31 RX ADMIN — LEVOTHYROXINE SODIUM SCH MCG: 75 TABLET ORAL at 06:24

## 2016-01-31 RX ADMIN — Medication SCH EACH: at 19:38

## 2016-01-31 RX ADMIN — Medication SCH MG: at 19:39

## 2016-02-01 RX ADMIN — Medication SCH EACH: at 09:21

## 2016-02-01 RX ADMIN — Medication SCH EACH: at 19:48

## 2016-02-01 RX ADMIN — LEVOTHYROXINE SODIUM SCH MCG: 75 TABLET ORAL at 07:25

## 2016-02-01 RX ADMIN — Medication SCH MG: at 19:49

## 2016-02-01 RX ADMIN — Medication SCH MG: at 09:21

## 2016-02-01 RX ADMIN — MAGNESIUM OXIDE TAB 400 MG (241.3 MG ELEMENTAL MG) SCH TAB: 400 (241.3 MG) TAB at 19:50

## 2016-02-01 RX ADMIN — Medication SCH MG: at 19:50

## 2016-02-01 RX ADMIN — Medication SCH EACH: at 19:49

## 2016-02-01 RX ADMIN — Medication SCH UNIT: at 09:21

## 2016-02-01 NOTE — PN
Progress Note for JENIFER SHEETS  Date:  02/01/2016  Room #:  VM.207

 

SUBJECTIVE:  This is an 86-year-old on swing bed, non-skilled.  She has been

doing good.  Her only concern is some vaginal irritation, more so on the

outside, not when she urinates.  She has been using some Barrier cream and it is

helping.  Otherwise, she denies any shortness of breath.  No leg swelling.  She

continues to have arthritis in her legs and arms, which affects her mobility.

Her moods are pleasant.

 

OBJECTIVE:  Vital Signs:  Her temperature is 97.5, pulse 75, blood pressure

135/62, respiratory rate 16, O2 93% on room air.

General:  She is in no acute distress.

Heart:  Irregularly irregular.

Lungs:  Sounds clear to auscultation bilaterally without crackles or wheezes.

Extremities:  Warm and dry.  No edema.

Mental Status:  Alert and oriented x3.

 

ASSESSMENT:

1. Atrial fibrillation with previous transient ischemic attack, on Pradaxa.

2. Hypothyroidism.

3. Chronic leg weakness.

4. Osteoarthritis, controlled.

5. Essential hypertension.

6. Vaginal irritation, possibly atrophic vaginitis.

 

PLAN:  At this point, the patient will continue swing bed cares.  She will

continue using Barrier cream.  If her skin irritation continues, we will do a

vaginal exam.  She may need some Premarin cream or clobetasol.  Recertify again

in 30 days.

 

 

MKA:  02/01/2016 09:04:25  MODL:  02/01/2016 09:21:54

Job #:  [id number]

## 2016-02-02 RX ADMIN — Medication SCH MG: at 21:31

## 2016-02-02 RX ADMIN — Medication SCH UNIT: at 07:59

## 2016-02-02 RX ADMIN — Medication SCH EACH: at 21:30

## 2016-02-02 RX ADMIN — LEVOTHYROXINE SODIUM SCH MCG: 75 TABLET ORAL at 07:59

## 2016-02-02 RX ADMIN — Medication SCH EACH: at 08:00

## 2016-02-02 RX ADMIN — Medication SCH MG: at 08:00

## 2016-02-02 RX ADMIN — MAGNESIUM OXIDE TAB 400 MG (241.3 MG ELEMENTAL MG) SCH TAB: 400 (241.3 MG) TAB at 21:31

## 2016-02-02 RX ADMIN — Medication SCH EACH: at 21:31

## 2016-02-03 RX ADMIN — Medication SCH EACH: at 21:23

## 2016-02-03 RX ADMIN — Medication SCH MG: at 09:39

## 2016-02-03 RX ADMIN — Medication SCH EACH: at 09:39

## 2016-02-03 RX ADMIN — Medication SCH MG: at 21:23

## 2016-02-03 RX ADMIN — Medication SCH UNIT: at 09:39

## 2016-02-03 RX ADMIN — LEVOTHYROXINE SODIUM SCH MCG: 75 TABLET ORAL at 07:04

## 2016-02-03 RX ADMIN — MAGNESIUM OXIDE TAB 400 MG (241.3 MG ELEMENTAL MG) SCH TAB: 400 (241.3 MG) TAB at 21:23

## 2016-02-03 RX ADMIN — Medication SCH EACH: at 21:22

## 2016-02-04 RX ADMIN — LEVOTHYROXINE SODIUM SCH MCG: 75 TABLET ORAL at 07:06

## 2016-02-04 RX ADMIN — Medication SCH UNIT: at 09:19

## 2016-02-04 RX ADMIN — Medication SCH MG: at 20:45

## 2016-02-04 RX ADMIN — Medication SCH MG: at 09:19

## 2016-02-04 RX ADMIN — Medication SCH MG: at 20:44

## 2016-02-04 RX ADMIN — Medication SCH EACH: at 09:19

## 2016-02-04 RX ADMIN — Medication SCH EACH: at 20:44

## 2016-02-04 RX ADMIN — MAGNESIUM OXIDE TAB 400 MG (241.3 MG ELEMENTAL MG) SCH TAB: 400 (241.3 MG) TAB at 20:45

## 2016-02-05 RX ADMIN — Medication SCH EACH: at 09:19

## 2016-02-05 RX ADMIN — Medication SCH EACH: at 20:55

## 2016-02-05 RX ADMIN — LEVOTHYROXINE SODIUM SCH MCG: 75 TABLET ORAL at 05:59

## 2016-02-05 RX ADMIN — Medication SCH MG: at 20:56

## 2016-02-05 RX ADMIN — MAGNESIUM OXIDE TAB 400 MG (241.3 MG ELEMENTAL MG) SCH TAB: 400 (241.3 MG) TAB at 20:56

## 2016-02-05 RX ADMIN — Medication SCH UNIT: at 09:21

## 2016-02-05 RX ADMIN — Medication SCH MG: at 09:20

## 2016-02-06 RX ADMIN — Medication SCH: at 09:17

## 2016-02-06 RX ADMIN — Medication SCH: at 09:16

## 2016-02-06 RX ADMIN — Medication SCH EACH: at 21:45

## 2016-02-06 RX ADMIN — MAGNESIUM OXIDE TAB 400 MG (241.3 MG ELEMENTAL MG) SCH TAB: 400 (241.3 MG) TAB at 21:46

## 2016-02-06 RX ADMIN — Medication SCH MG: at 21:46

## 2016-02-07 RX ADMIN — Medication SCH MG: at 20:03

## 2016-02-07 RX ADMIN — LEVOTHYROXINE SODIUM SCH: 75 TABLET ORAL at 07:09

## 2016-02-07 RX ADMIN — Medication SCH UNIT: at 09:29

## 2016-02-07 RX ADMIN — Medication SCH MG: at 09:29

## 2016-02-07 RX ADMIN — MAGNESIUM OXIDE TAB 400 MG (241.3 MG ELEMENTAL MG) SCH TAB: 400 (241.3 MG) TAB at 20:02

## 2016-02-07 RX ADMIN — Medication SCH MG: at 20:02

## 2016-02-07 RX ADMIN — Medication SCH EACH: at 20:02

## 2016-02-07 RX ADMIN — Medication SCH EACH: at 09:29

## 2016-02-08 RX ADMIN — Medication SCH EACH: at 21:03

## 2016-02-08 RX ADMIN — Medication SCH UNIT: at 09:24

## 2016-02-08 RX ADMIN — Medication SCH MG: at 21:16

## 2016-02-08 RX ADMIN — MAGNESIUM OXIDE TAB 400 MG (241.3 MG ELEMENTAL MG) SCH TAB: 400 (241.3 MG) TAB at 21:16

## 2016-02-08 RX ADMIN — LEVOTHYROXINE SODIUM SCH MCG: 75 TABLET ORAL at 06:06

## 2016-02-08 RX ADMIN — Medication SCH MG: at 09:24

## 2016-02-08 RX ADMIN — Medication SCH EACH: at 21:14

## 2016-02-08 RX ADMIN — Medication SCH MG: at 21:15

## 2016-02-08 RX ADMIN — Medication SCH EACH: at 09:23

## 2016-02-09 RX ADMIN — Medication SCH UNIT: at 10:26

## 2016-02-09 RX ADMIN — Medication SCH MG: at 10:27

## 2016-02-09 RX ADMIN — Medication SCH EACH: at 19:46

## 2016-02-09 RX ADMIN — Medication SCH EACH: at 10:27

## 2016-02-09 RX ADMIN — MAGNESIUM OXIDE TAB 400 MG (241.3 MG ELEMENTAL MG) SCH TAB: 400 (241.3 MG) TAB at 19:46

## 2016-02-09 RX ADMIN — Medication SCH MG: at 19:47

## 2016-02-09 RX ADMIN — LEVOTHYROXINE SODIUM SCH MCG: 75 TABLET ORAL at 10:26

## 2016-02-09 RX ADMIN — Medication SCH MG: at 19:46

## 2016-02-10 RX ADMIN — LEVOTHYROXINE SODIUM SCH MCG: 75 TABLET ORAL at 06:24

## 2016-02-10 RX ADMIN — Medication SCH EACH: at 20:52

## 2016-02-10 RX ADMIN — Medication SCH MG: at 09:19

## 2016-02-10 RX ADMIN — Medication SCH EACH: at 09:19

## 2016-02-10 RX ADMIN — MAGNESIUM OXIDE TAB 400 MG (241.3 MG ELEMENTAL MG) SCH TAB: 400 (241.3 MG) TAB at 20:52

## 2016-02-10 RX ADMIN — Medication SCH UNIT: at 09:19

## 2016-02-10 RX ADMIN — Medication SCH MG: at 20:52

## 2016-02-11 RX ADMIN — Medication SCH MG: at 10:09

## 2016-02-11 RX ADMIN — MAGNESIUM OXIDE TAB 400 MG (241.3 MG ELEMENTAL MG) SCH TAB: 400 (241.3 MG) TAB at 20:21

## 2016-02-11 RX ADMIN — Medication SCH MG: at 20:20

## 2016-02-11 RX ADMIN — Medication SCH MG: at 20:21

## 2016-02-11 RX ADMIN — Medication SCH UNIT: at 10:10

## 2016-02-11 RX ADMIN — Medication SCH EACH: at 20:20

## 2016-02-11 RX ADMIN — Medication SCH EACH: at 10:09

## 2016-02-11 RX ADMIN — LEVOTHYROXINE SODIUM SCH MCG: 75 TABLET ORAL at 06:12

## 2016-02-12 RX ADMIN — LEVOTHYROXINE SODIUM SCH MCG: 75 TABLET ORAL at 06:00

## 2016-02-12 RX ADMIN — Medication SCH MG: at 10:13

## 2016-02-12 RX ADMIN — Medication SCH UNIT: at 10:14

## 2016-02-12 RX ADMIN — Medication SCH MG: at 20:49

## 2016-02-12 RX ADMIN — Medication SCH EACH: at 10:14

## 2016-02-12 RX ADMIN — Medication SCH EACH: at 20:48

## 2016-02-12 RX ADMIN — Medication SCH MG: at 20:48

## 2016-02-12 RX ADMIN — MAGNESIUM OXIDE TAB 400 MG (241.3 MG ELEMENTAL MG) SCH TAB: 400 (241.3 MG) TAB at 20:48

## 2016-02-13 RX ADMIN — Medication SCH UNIT: at 08:59

## 2016-02-13 RX ADMIN — MAGNESIUM OXIDE TAB 400 MG (241.3 MG ELEMENTAL MG) SCH TAB: 400 (241.3 MG) TAB at 20:27

## 2016-02-13 RX ADMIN — Medication SCH EACH: at 20:26

## 2016-02-13 RX ADMIN — Medication SCH MG: at 20:27

## 2016-02-13 RX ADMIN — Medication SCH MG: at 08:59

## 2016-02-13 RX ADMIN — Medication SCH EACH: at 08:59

## 2016-02-13 RX ADMIN — Medication SCH MG: at 20:26

## 2016-02-14 RX ADMIN — MAGNESIUM OXIDE TAB 400 MG (241.3 MG ELEMENTAL MG) SCH TAB: 400 (241.3 MG) TAB at 21:06

## 2016-02-14 RX ADMIN — Medication SCH EACH: at 09:15

## 2016-02-14 RX ADMIN — LEVOTHYROXINE SODIUM SCH MCG: 75 TABLET ORAL at 06:22

## 2016-02-14 RX ADMIN — Medication SCH UNIT: at 09:15

## 2016-02-14 RX ADMIN — Medication SCH EACH: at 21:05

## 2016-02-14 RX ADMIN — Medication SCH MG: at 21:05

## 2016-02-14 RX ADMIN — Medication SCH MG: at 09:15

## 2016-02-14 RX ADMIN — Medication SCH MG: at 21:04

## 2016-02-15 RX ADMIN — Medication SCH UNIT: at 09:37

## 2016-02-15 RX ADMIN — Medication SCH MG: at 09:37

## 2016-02-15 RX ADMIN — MAGNESIUM OXIDE TAB 400 MG (241.3 MG ELEMENTAL MG) SCH TAB: 400 (241.3 MG) TAB at 22:03

## 2016-02-15 RX ADMIN — LEVOTHYROXINE SODIUM SCH MCG: 75 TABLET ORAL at 06:47

## 2016-02-15 RX ADMIN — Medication SCH MG: at 22:03

## 2016-02-15 RX ADMIN — Medication SCH MG: at 22:02

## 2016-02-15 RX ADMIN — Medication SCH EACH: at 22:02

## 2016-02-15 RX ADMIN — Medication SCH EACH: at 09:37

## 2016-02-16 RX ADMIN — LEVOTHYROXINE SODIUM SCH MCG: 75 TABLET ORAL at 08:28

## 2016-02-16 RX ADMIN — MAGNESIUM OXIDE TAB 400 MG (241.3 MG ELEMENTAL MG) SCH TAB: 400 (241.3 MG) TAB at 20:42

## 2016-02-16 RX ADMIN — Medication SCH MG: at 20:42

## 2016-02-16 RX ADMIN — Medication SCH UNIT: at 08:27

## 2016-02-16 RX ADMIN — Medication SCH EACH: at 08:28

## 2016-02-16 RX ADMIN — Medication SCH EACH: at 20:42

## 2016-02-16 RX ADMIN — Medication SCH MG: at 08:27

## 2016-02-17 RX ADMIN — Medication SCH MG: at 22:04

## 2016-02-17 RX ADMIN — Medication SCH EACH: at 08:59

## 2016-02-17 RX ADMIN — LEVOTHYROXINE SODIUM SCH: 75 TABLET ORAL at 06:11

## 2016-02-17 RX ADMIN — Medication SCH UNIT: at 09:00

## 2016-02-17 RX ADMIN — MAGNESIUM OXIDE TAB 400 MG (241.3 MG ELEMENTAL MG) SCH TAB: 400 (241.3 MG) TAB at 22:04

## 2016-02-17 RX ADMIN — LEVOTHYROXINE SODIUM SCH MCG: 75 TABLET ORAL at 05:39

## 2016-02-17 RX ADMIN — Medication SCH EACH: at 22:03

## 2016-02-17 RX ADMIN — Medication SCH EACH: at 22:04

## 2016-02-17 RX ADMIN — Medication SCH MG: at 09:00

## 2016-02-18 RX ADMIN — Medication SCH EACH: at 20:41

## 2016-02-18 RX ADMIN — LEVOTHYROXINE SODIUM SCH: 75 TABLET ORAL at 06:28

## 2016-02-18 RX ADMIN — Medication SCH MG: at 20:42

## 2016-02-18 RX ADMIN — Medication SCH MG: at 09:27

## 2016-02-18 RX ADMIN — Medication SCH MG: at 20:41

## 2016-02-18 RX ADMIN — MAGNESIUM OXIDE TAB 400 MG (241.3 MG ELEMENTAL MG) SCH TAB: 400 (241.3 MG) TAB at 20:42

## 2016-02-18 RX ADMIN — Medication SCH EACH: at 09:28

## 2016-02-18 RX ADMIN — LEVOTHYROXINE SODIUM SCH MCG: 75 TABLET ORAL at 05:38

## 2016-02-18 RX ADMIN — Medication SCH UNIT: at 09:27

## 2016-02-19 RX ADMIN — Medication SCH UNIT: at 10:05

## 2016-02-19 RX ADMIN — Medication SCH MG: at 20:09

## 2016-02-19 RX ADMIN — MAGNESIUM OXIDE TAB 400 MG (241.3 MG ELEMENTAL MG) SCH TAB: 400 (241.3 MG) TAB at 20:09

## 2016-02-19 RX ADMIN — Medication SCH MG: at 10:04

## 2016-02-19 RX ADMIN — Medication SCH EACH: at 10:04

## 2016-02-19 RX ADMIN — LEVOTHYROXINE SODIUM SCH MCG: 75 TABLET ORAL at 06:21

## 2016-02-19 RX ADMIN — Medication SCH EACH: at 20:09

## 2016-02-20 RX ADMIN — Medication SCH MG: at 19:45

## 2016-02-20 RX ADMIN — MAGNESIUM OXIDE TAB 400 MG (241.3 MG ELEMENTAL MG) SCH TAB: 400 (241.3 MG) TAB at 19:46

## 2016-02-20 RX ADMIN — Medication SCH UNIT: at 09:42

## 2016-02-20 RX ADMIN — Medication SCH MG: at 19:46

## 2016-02-20 RX ADMIN — Medication SCH EACH: at 09:41

## 2016-02-20 RX ADMIN — Medication SCH MG: at 09:42

## 2016-02-20 RX ADMIN — Medication SCH EACH: at 19:45

## 2016-02-20 RX ADMIN — Medication SCH EACH: at 19:46

## 2016-02-21 RX ADMIN — MAGNESIUM OXIDE TAB 400 MG (241.3 MG ELEMENTAL MG) SCH TAB: 400 (241.3 MG) TAB at 19:54

## 2016-02-21 RX ADMIN — Medication SCH EACH: at 19:55

## 2016-02-21 RX ADMIN — Medication SCH UNIT: at 08:24

## 2016-02-21 RX ADMIN — Medication SCH EACH: at 08:23

## 2016-02-21 RX ADMIN — Medication SCH MG: at 08:24

## 2016-02-21 RX ADMIN — Medication SCH MG: at 19:54

## 2016-02-21 RX ADMIN — Medication SCH EACH: at 19:54

## 2016-02-21 RX ADMIN — LEVOTHYROXINE SODIUM SCH MCG: 75 TABLET ORAL at 06:15

## 2016-02-21 RX ADMIN — Medication SCH MG: at 19:55

## 2016-02-22 RX ADMIN — Medication SCH EACH: at 20:45

## 2016-02-22 RX ADMIN — Medication SCH EACH: at 09:55

## 2016-02-22 RX ADMIN — Medication SCH MG: at 20:46

## 2016-02-22 RX ADMIN — LEVOTHYROXINE SODIUM SCH MCG: 75 TABLET ORAL at 06:18

## 2016-02-22 RX ADMIN — Medication SCH UNIT: at 09:55

## 2016-02-22 RX ADMIN — MAGNESIUM OXIDE TAB 400 MG (241.3 MG ELEMENTAL MG) SCH TAB: 400 (241.3 MG) TAB at 20:46

## 2016-02-22 RX ADMIN — Medication SCH MG: at 09:54

## 2016-02-23 RX ADMIN — Medication SCH MG: at 09:32

## 2016-02-23 RX ADMIN — Medication SCH EACH: at 09:31

## 2016-02-23 RX ADMIN — Medication SCH UNIT: at 09:32

## 2016-02-23 RX ADMIN — Medication SCH EACH: at 20:04

## 2016-02-23 RX ADMIN — MAGNESIUM OXIDE TAB 400 MG (241.3 MG ELEMENTAL MG) SCH TAB: 400 (241.3 MG) TAB at 20:04

## 2016-02-23 RX ADMIN — LEVOTHYROXINE SODIUM SCH MCG: 75 TABLET ORAL at 06:37

## 2016-02-23 RX ADMIN — Medication SCH MG: at 20:04

## 2016-02-23 RX ADMIN — Medication SCH EACH: at 20:03

## 2016-02-24 RX ADMIN — Medication SCH MG: at 20:24

## 2016-02-24 RX ADMIN — Medication SCH EACH: at 20:24

## 2016-02-24 RX ADMIN — LEVOTHYROXINE SODIUM SCH MCG: 75 TABLET ORAL at 06:07

## 2016-02-24 RX ADMIN — Medication SCH UNIT: at 09:05

## 2016-02-24 RX ADMIN — Medication SCH EACH: at 09:05

## 2016-02-24 RX ADMIN — Medication SCH MG: at 20:25

## 2016-02-24 RX ADMIN — MAGNESIUM OXIDE TAB 400 MG (241.3 MG ELEMENTAL MG) SCH TAB: 400 (241.3 MG) TAB at 20:25

## 2016-02-24 RX ADMIN — Medication SCH MG: at 09:05

## 2016-02-25 RX ADMIN — Medication SCH MG: at 19:28

## 2016-02-25 RX ADMIN — LEVOTHYROXINE SODIUM SCH: 75 TABLET ORAL at 06:28

## 2016-02-25 RX ADMIN — Medication SCH UNIT: at 09:07

## 2016-02-25 RX ADMIN — Medication SCH MG: at 09:07

## 2016-02-25 RX ADMIN — Medication SCH EACH: at 09:07

## 2016-02-25 RX ADMIN — Medication SCH EACH: at 19:27

## 2016-02-25 RX ADMIN — MAGNESIUM OXIDE TAB 400 MG (241.3 MG ELEMENTAL MG) SCH TAB: 400 (241.3 MG) TAB at 19:28

## 2016-02-25 RX ADMIN — LEVOTHYROXINE SODIUM SCH MCG: 75 TABLET ORAL at 05:42

## 2016-02-26 RX ADMIN — MAGNESIUM OXIDE TAB 400 MG (241.3 MG ELEMENTAL MG) SCH TAB: 400 (241.3 MG) TAB at 21:37

## 2016-02-26 RX ADMIN — LEVOTHYROXINE SODIUM SCH MCG: 75 TABLET ORAL at 05:34

## 2016-02-26 RX ADMIN — Medication SCH MG: at 21:38

## 2016-02-26 RX ADMIN — Medication SCH MG: at 10:18

## 2016-02-26 RX ADMIN — Medication SCH MG: at 21:36

## 2016-02-26 RX ADMIN — Medication SCH EACH: at 10:16

## 2016-02-26 RX ADMIN — LEVOTHYROXINE SODIUM SCH: 75 TABLET ORAL at 06:30

## 2016-02-26 RX ADMIN — Medication SCH EACH: at 21:37

## 2016-02-26 RX ADMIN — Medication SCH EACH: at 21:38

## 2016-02-26 RX ADMIN — Medication SCH UNIT: at 10:19

## 2016-02-27 RX ADMIN — Medication SCH MG: at 20:51

## 2016-02-27 RX ADMIN — MAGNESIUM OXIDE TAB 400 MG (241.3 MG ELEMENTAL MG) SCH TAB: 400 (241.3 MG) TAB at 20:51

## 2016-02-27 RX ADMIN — Medication SCH EACH: at 20:51

## 2016-02-27 RX ADMIN — Medication SCH MG: at 10:03

## 2016-02-27 RX ADMIN — Medication SCH UNIT: at 10:04

## 2016-02-27 RX ADMIN — Medication SCH EACH: at 20:52

## 2016-02-27 RX ADMIN — Medication SCH EACH: at 10:00

## 2016-02-28 RX ADMIN — Medication SCH UNIT: at 07:43

## 2016-02-28 RX ADMIN — Medication SCH MG: at 07:45

## 2016-02-28 RX ADMIN — Medication SCH: at 23:12

## 2016-02-28 RX ADMIN — MAGNESIUM OXIDE TAB 400 MG (241.3 MG ELEMENTAL MG) SCH: 400 (241.3 MG) TAB at 23:12

## 2016-02-28 RX ADMIN — Medication SCH EACH: at 07:44

## 2016-02-28 RX ADMIN — LEVOTHYROXINE SODIUM SCH MCG: 75 TABLET ORAL at 07:43

## 2016-02-29 RX ADMIN — Medication SCH MG: at 18:04

## 2016-02-29 RX ADMIN — Medication SCH EACH: at 18:04

## 2016-02-29 RX ADMIN — Medication SCH EACH: at 10:34

## 2016-02-29 RX ADMIN — Medication SCH: at 18:42

## 2016-02-29 RX ADMIN — Medication SCH EACH: at 18:05

## 2016-02-29 RX ADMIN — LEVOTHYROXINE SODIUM SCH: 75 TABLET ORAL at 06:17

## 2016-02-29 RX ADMIN — Medication SCH UNIT: at 10:35

## 2016-02-29 RX ADMIN — Medication SCH MG: at 10:34

## 2016-02-29 RX ADMIN — MAGNESIUM OXIDE TAB 400 MG (241.3 MG ELEMENTAL MG) SCH TAB: 400 (241.3 MG) TAB at 18:05

## 2016-02-29 RX ADMIN — Medication SCH: at 18:41

## 2016-02-29 RX ADMIN — MAGNESIUM OXIDE TAB 400 MG (241.3 MG ELEMENTAL MG) SCH: 400 (241.3 MG) TAB at 18:41

## 2016-02-29 RX ADMIN — Medication SCH: at 18:43

## 2016-02-29 RX ADMIN — Medication SCH MG: at 18:05

## 2016-03-01 RX ADMIN — LEVOTHYROXINE SODIUM SCH MCG: 75 TABLET ORAL at 06:35

## 2016-03-01 RX ADMIN — Medication SCH MG: at 08:31

## 2016-03-01 RX ADMIN — Medication SCH EACH: at 08:30

## 2016-03-01 RX ADMIN — Medication SCH UNIT: at 08:29

## 2016-03-01 RX ADMIN — Medication SCH EACH: at 18:23

## 2016-03-01 RX ADMIN — Medication SCH EACH: at 18:24

## 2016-03-01 RX ADMIN — Medication SCH MG: at 18:23

## 2016-03-01 RX ADMIN — MAGNESIUM OXIDE TAB 400 MG (241.3 MG ELEMENTAL MG) SCH TAB: 400 (241.3 MG) TAB at 18:23

## 2016-03-02 RX ADMIN — LEVOTHYROXINE SODIUM SCH MCG: 75 TABLET ORAL at 05:59

## 2016-03-02 RX ADMIN — Medication SCH UNIT: at 09:08

## 2016-03-02 RX ADMIN — Medication SCH EACH: at 18:12

## 2016-03-02 RX ADMIN — Medication SCH EACH: at 09:07

## 2016-03-02 RX ADMIN — MAGNESIUM OXIDE TAB 400 MG (241.3 MG ELEMENTAL MG) SCH TAB: 400 (241.3 MG) TAB at 18:11

## 2016-03-02 RX ADMIN — Medication SCH MG: at 09:08

## 2016-03-02 RX ADMIN — Medication SCH MG: at 18:11

## 2016-03-02 RX ADMIN — Medication SCH EACH: at 18:11

## 2016-03-03 RX ADMIN — Medication SCH EACH: at 09:20

## 2016-03-03 RX ADMIN — Medication SCH UNIT: at 09:20

## 2016-03-03 RX ADMIN — Medication SCH EACH: at 18:47

## 2016-03-03 RX ADMIN — Medication SCH MG: at 09:20

## 2016-03-03 RX ADMIN — Medication SCH MG: at 18:47

## 2016-03-03 RX ADMIN — MAGNESIUM OXIDE TAB 400 MG (241.3 MG ELEMENTAL MG) SCH TAB: 400 (241.3 MG) TAB at 18:47

## 2016-03-03 RX ADMIN — Medication SCH MG: at 18:48

## 2016-03-03 RX ADMIN — Medication SCH EACH: at 18:46

## 2016-03-03 RX ADMIN — LEVOTHYROXINE SODIUM SCH MCG: 75 TABLET ORAL at 06:20

## 2016-03-04 RX ADMIN — Medication SCH MG: at 18:35

## 2016-03-04 RX ADMIN — Medication SCH UNIT: at 09:43

## 2016-03-04 RX ADMIN — Medication SCH MG: at 09:43

## 2016-03-04 RX ADMIN — MAGNESIUM OXIDE TAB 400 MG (241.3 MG ELEMENTAL MG) SCH TAB: 400 (241.3 MG) TAB at 18:35

## 2016-03-04 RX ADMIN — LEVOTHYROXINE SODIUM SCH MCG: 75 TABLET ORAL at 06:07

## 2016-03-04 RX ADMIN — Medication SCH EACH: at 18:36

## 2016-03-04 RX ADMIN — Medication SCH EACH: at 09:43

## 2016-03-04 NOTE — PN
Progress Note for JENIFER SHEETS  Date:  03/04/2016  Room #:  VM.207

 

SUBJECTIVE:  This is an 86-year-old on swing bed, non-skilled due to arthritis

in her arms and legs limiting her mobility.  The patient's mood continue to be

pleasant.  Her only concern is some vaginal irritation that has been going on

for a month.  She has been using some barrier cream, which helps.  She denies

any urgency or burning with urination.

 

OBJECTIVE:  Vital Signs:  Temperature 97.6, pulse 66, blood pressure 134/77,

respiratory rate 20, O2 is 94% on room air.

General:  She is in no acute distress.

Heart:  Irregularly irregular.

Lungs:  Sounds are clear to auscultation bilaterally without crackles or

wheezes.

Abdomen:  Positive bowel sounds.  Soft and nontender.

Extremities:  Warm and dry.  No edema.

Mental Status:  Alert and oriented x3.

Vaginal:  Vulvar exam does show her to have some swollen vulva with no redness

or discharge.

 

ASSESSMENT:

1. Vaginitis, probably atrophic.  At this point, we are going to try some

    clobetasol cream twice daily for 2 weeks.

2. Atrial fibrillation with previous transient ischemic attack on Pradaxa.

3. Mild thrombocytopenia.  We will repeat lab work in the next 2-3 months.

4. Chronic leg weakness.

5. Osteoarthritis, pain is controlled.

6. Essential hypertension, controlled.

 

PLAN:  At this point, the patient will continue on swing bed cares.  I did okay

her to have 1 beer with pizza.  She is only planning to do this intermittently.

Re-certify again in 30 days.

 

 

MKA:  03/04/2016 21:17:10  MODL:  03/04/2016 21:31:23

Job #:  [id number]

## 2016-03-05 RX ADMIN — Medication SCH MG: at 09:27

## 2016-03-05 RX ADMIN — Medication SCH UNIT: at 09:27

## 2016-03-05 RX ADMIN — Medication SCH EACH: at 18:15

## 2016-03-05 RX ADMIN — MAGNESIUM OXIDE TAB 400 MG (241.3 MG ELEMENTAL MG) SCH TAB: 400 (241.3 MG) TAB at 18:16

## 2016-03-05 RX ADMIN — CLOBETASOL PROPIONATE SCH APPLIC: 0.5 CREAM TOPICAL at 18:15

## 2016-03-05 RX ADMIN — Medication SCH EACH: at 18:16

## 2016-03-05 RX ADMIN — Medication SCH MG: at 18:16

## 2016-03-05 RX ADMIN — Medication SCH EACH: at 09:27

## 2016-03-06 RX ADMIN — Medication SCH MG: at 18:11

## 2016-03-06 RX ADMIN — LEVOTHYROXINE SODIUM SCH: 75 TABLET ORAL at 07:11

## 2016-03-06 RX ADMIN — CLOBETASOL PROPIONATE SCH APPLIC: 0.5 CREAM TOPICAL at 18:12

## 2016-03-06 RX ADMIN — Medication SCH EACH: at 09:21

## 2016-03-06 RX ADMIN — Medication SCH EACH: at 18:11

## 2016-03-06 RX ADMIN — MAGNESIUM OXIDE TAB 400 MG (241.3 MG ELEMENTAL MG) SCH TAB: 400 (241.3 MG) TAB at 18:11

## 2016-03-06 RX ADMIN — Medication SCH EACH: at 18:10

## 2016-03-06 RX ADMIN — LEVOTHYROXINE SODIUM SCH MCG: 75 TABLET ORAL at 05:35

## 2016-03-06 RX ADMIN — Medication SCH UNIT: at 09:22

## 2016-03-06 RX ADMIN — CLOBETASOL PROPIONATE SCH APPLIC: 0.5 CREAM TOPICAL at 09:23

## 2016-03-06 RX ADMIN — Medication SCH MG: at 09:22

## 2016-03-07 RX ADMIN — Medication SCH UNIT: at 09:21

## 2016-03-07 RX ADMIN — Medication SCH EACH: at 09:21

## 2016-03-07 RX ADMIN — Medication SCH MG: at 09:22

## 2016-03-07 RX ADMIN — CLOBETASOL PROPIONATE SCH APPLIC: 0.5 CREAM TOPICAL at 09:22

## 2016-03-07 RX ADMIN — Medication SCH EACH: at 18:06

## 2016-03-07 RX ADMIN — Medication SCH MG: at 18:06

## 2016-03-07 RX ADMIN — CLOBETASOL PROPIONATE SCH APPLIC: 0.5 CREAM TOPICAL at 18:05

## 2016-03-07 RX ADMIN — MAGNESIUM OXIDE TAB 400 MG (241.3 MG ELEMENTAL MG) SCH TAB: 400 (241.3 MG) TAB at 18:06

## 2016-03-07 RX ADMIN — LEVOTHYROXINE SODIUM SCH MCG: 75 TABLET ORAL at 06:34

## 2016-03-08 RX ADMIN — Medication SCH MG: at 09:31

## 2016-03-08 RX ADMIN — Medication SCH EACH: at 09:30

## 2016-03-08 RX ADMIN — CLOBETASOL PROPIONATE SCH APPLIC: 0.5 CREAM TOPICAL at 09:31

## 2016-03-08 RX ADMIN — Medication SCH EACH: at 18:16

## 2016-03-08 RX ADMIN — Medication SCH EACH: at 18:15

## 2016-03-08 RX ADMIN — CLOBETASOL PROPIONATE SCH APPLIC: 0.5 CREAM TOPICAL at 18:17

## 2016-03-08 RX ADMIN — Medication SCH UNIT: at 09:31

## 2016-03-08 RX ADMIN — Medication SCH MG: at 18:16

## 2016-03-08 RX ADMIN — LEVOTHYROXINE SODIUM SCH MCG: 75 TABLET ORAL at 06:10

## 2016-03-08 RX ADMIN — MAGNESIUM OXIDE TAB 400 MG (241.3 MG ELEMENTAL MG) SCH TAB: 400 (241.3 MG) TAB at 18:16

## 2016-03-09 RX ADMIN — MAGNESIUM OXIDE TAB 400 MG (241.3 MG ELEMENTAL MG) SCH TAB: 400 (241.3 MG) TAB at 18:09

## 2016-03-09 RX ADMIN — Medication SCH MG: at 08:57

## 2016-03-09 RX ADMIN — Medication SCH UNIT: at 08:57

## 2016-03-09 RX ADMIN — CLOBETASOL PROPIONATE SCH APPLIC: 0.5 CREAM TOPICAL at 08:57

## 2016-03-09 RX ADMIN — Medication SCH EACH: at 08:57

## 2016-03-09 RX ADMIN — Medication SCH EACH: at 18:10

## 2016-03-09 RX ADMIN — LEVOTHYROXINE SODIUM SCH MCG: 75 TABLET ORAL at 06:16

## 2016-03-09 RX ADMIN — CLOBETASOL PROPIONATE SCH APPLIC: 0.5 CREAM TOPICAL at 18:11

## 2016-03-09 RX ADMIN — Medication SCH MG: at 18:10

## 2016-03-10 RX ADMIN — Medication SCH EACH: at 19:23

## 2016-03-10 RX ADMIN — LEVOTHYROXINE SODIUM SCH: 75 TABLET ORAL at 06:18

## 2016-03-10 RX ADMIN — Medication SCH MG: at 19:28

## 2016-03-10 RX ADMIN — MAGNESIUM OXIDE TAB 400 MG (241.3 MG ELEMENTAL MG) SCH TAB: 400 (241.3 MG) TAB at 19:28

## 2016-03-10 RX ADMIN — Medication SCH EACH: at 19:27

## 2016-03-10 RX ADMIN — CLOBETASOL PROPIONATE SCH APPLIC: 0.5 CREAM TOPICAL at 19:23

## 2016-03-10 RX ADMIN — Medication SCH MG: at 09:16

## 2016-03-10 RX ADMIN — Medication SCH MG: at 19:27

## 2016-03-10 RX ADMIN — CLOBETASOL PROPIONATE SCH APPLIC: 0.5 CREAM TOPICAL at 09:18

## 2016-03-10 RX ADMIN — LEVOTHYROXINE SODIUM SCH MCG: 75 TABLET ORAL at 05:40

## 2016-03-10 RX ADMIN — Medication SCH EACH: at 09:16

## 2016-03-10 RX ADMIN — Medication SCH UNIT: at 09:17

## 2016-03-11 RX ADMIN — Medication SCH EACH: at 09:20

## 2016-03-11 RX ADMIN — Medication SCH UNIT: at 09:20

## 2016-03-11 RX ADMIN — Medication SCH MG: at 09:21

## 2016-03-11 RX ADMIN — LEVOTHYROXINE SODIUM SCH: 75 TABLET ORAL at 06:15

## 2016-03-11 RX ADMIN — Medication SCH EACH: at 18:03

## 2016-03-11 RX ADMIN — MAGNESIUM OXIDE TAB 400 MG (241.3 MG ELEMENTAL MG) SCH TAB: 400 (241.3 MG) TAB at 18:02

## 2016-03-11 RX ADMIN — LEVOTHYROXINE SODIUM SCH MCG: 75 TABLET ORAL at 05:56

## 2016-03-11 RX ADMIN — CLOBETASOL PROPIONATE SCH APPLIC: 0.5 CREAM TOPICAL at 09:21

## 2016-03-11 RX ADMIN — Medication SCH EACH: at 18:01

## 2016-03-11 RX ADMIN — CLOBETASOL PROPIONATE SCH APPLIC: 0.5 CREAM TOPICAL at 22:57

## 2016-03-11 RX ADMIN — Medication SCH MG: at 18:02

## 2016-03-12 RX ADMIN — Medication SCH EACH: at 18:46

## 2016-03-12 RX ADMIN — MAGNESIUM OXIDE TAB 400 MG (241.3 MG ELEMENTAL MG) SCH TAB: 400 (241.3 MG) TAB at 18:46

## 2016-03-12 RX ADMIN — Medication SCH MG: at 11:09

## 2016-03-12 RX ADMIN — CLOBETASOL PROPIONATE SCH APPLIC: 0.5 CREAM TOPICAL at 18:47

## 2016-03-12 RX ADMIN — Medication SCH UNIT: at 11:07

## 2016-03-12 RX ADMIN — Medication SCH MG: at 18:48

## 2016-03-12 RX ADMIN — Medication SCH MG: at 18:46

## 2016-03-12 RX ADMIN — CLOBETASOL PROPIONATE SCH APPLIC: 0.5 CREAM TOPICAL at 11:08

## 2016-03-12 RX ADMIN — Medication SCH EACH: at 18:47

## 2016-03-12 RX ADMIN — Medication SCH EACH: at 11:09

## 2016-03-13 RX ADMIN — CLOBETASOL PROPIONATE SCH APPLIC: 0.5 CREAM TOPICAL at 18:01

## 2016-03-13 RX ADMIN — Medication SCH UNIT: at 09:22

## 2016-03-13 RX ADMIN — LEVOTHYROXINE SODIUM SCH MCG: 75 TABLET ORAL at 06:23

## 2016-03-13 RX ADMIN — Medication SCH EACH: at 18:01

## 2016-03-13 RX ADMIN — Medication SCH MG: at 18:01

## 2016-03-13 RX ADMIN — Medication SCH EACH: at 09:21

## 2016-03-13 RX ADMIN — Medication SCH MG: at 09:22

## 2016-03-13 RX ADMIN — MAGNESIUM OXIDE TAB 400 MG (241.3 MG ELEMENTAL MG) SCH TAB: 400 (241.3 MG) TAB at 18:01

## 2016-03-13 RX ADMIN — CLOBETASOL PROPIONATE SCH APPLIC: 0.5 CREAM TOPICAL at 09:22

## 2016-03-14 RX ADMIN — CLOBETASOL PROPIONATE SCH APPLIC: 0.5 CREAM TOPICAL at 18:46

## 2016-03-14 RX ADMIN — Medication SCH UNIT: at 09:29

## 2016-03-14 RX ADMIN — LEVOTHYROXINE SODIUM SCH MCG: 75 TABLET ORAL at 06:08

## 2016-03-14 RX ADMIN — CLOBETASOL PROPIONATE SCH APPLIC: 0.5 CREAM TOPICAL at 09:30

## 2016-03-14 RX ADMIN — MAGNESIUM OXIDE TAB 400 MG (241.3 MG ELEMENTAL MG) SCH TAB: 400 (241.3 MG) TAB at 18:44

## 2016-03-14 RX ADMIN — Medication SCH MG: at 09:29

## 2016-03-14 RX ADMIN — Medication SCH EACH: at 09:29

## 2016-03-14 RX ADMIN — Medication SCH EACH: at 18:44

## 2016-03-14 RX ADMIN — Medication SCH MG: at 18:44

## 2016-03-14 RX ADMIN — Medication SCH EACH: at 18:43

## 2016-03-15 RX ADMIN — Medication SCH EACH: at 19:18

## 2016-03-15 RX ADMIN — LEVOTHYROXINE SODIUM SCH: 75 TABLET ORAL at 06:26

## 2016-03-15 RX ADMIN — Medication SCH EACH: at 19:15

## 2016-03-15 RX ADMIN — Medication SCH EACH: at 09:20

## 2016-03-15 RX ADMIN — MAGNESIUM OXIDE TAB 400 MG (241.3 MG ELEMENTAL MG) SCH TAB: 400 (241.3 MG) TAB at 19:16

## 2016-03-15 RX ADMIN — Medication SCH MG: at 09:20

## 2016-03-15 RX ADMIN — Medication SCH MG: at 19:16

## 2016-03-15 RX ADMIN — Medication SCH MG: at 19:17

## 2016-03-15 RX ADMIN — CLOBETASOL PROPIONATE SCH APPLIC: 0.5 CREAM TOPICAL at 09:21

## 2016-03-15 RX ADMIN — CLOBETASOL PROPIONATE SCH APPLIC: 0.5 CREAM TOPICAL at 22:37

## 2016-03-15 RX ADMIN — LEVOTHYROXINE SODIUM SCH MCG: 75 TABLET ORAL at 05:53

## 2016-03-15 RX ADMIN — Medication SCH UNIT: at 09:20

## 2016-03-16 RX ADMIN — LEVOTHYROXINE SODIUM SCH MCG: 75 TABLET ORAL at 06:21

## 2016-03-16 RX ADMIN — Medication SCH EACH: at 18:53

## 2016-03-16 RX ADMIN — CLOBETASOL PROPIONATE SCH APPLIC: 0.5 CREAM TOPICAL at 10:03

## 2016-03-16 RX ADMIN — Medication SCH UNIT: at 10:02

## 2016-03-16 RX ADMIN — Medication SCH MG: at 18:53

## 2016-03-16 RX ADMIN — Medication SCH MG: at 10:03

## 2016-03-16 RX ADMIN — CLOBETASOL PROPIONATE SCH APPLIC: 0.5 CREAM TOPICAL at 18:53

## 2016-03-16 RX ADMIN — Medication SCH EACH: at 10:03

## 2016-03-16 RX ADMIN — MAGNESIUM OXIDE TAB 400 MG (241.3 MG ELEMENTAL MG) SCH TAB: 400 (241.3 MG) TAB at 18:53

## 2016-03-16 RX ADMIN — Medication SCH MG: at 18:52

## 2016-03-17 RX ADMIN — Medication SCH EACH: at 18:35

## 2016-03-17 RX ADMIN — MAGNESIUM OXIDE TAB 400 MG (241.3 MG ELEMENTAL MG) SCH TAB: 400 (241.3 MG) TAB at 18:35

## 2016-03-17 RX ADMIN — CLOBETASOL PROPIONATE SCH APPLIC: 0.5 CREAM TOPICAL at 18:36

## 2016-03-17 RX ADMIN — Medication SCH MG: at 18:35

## 2016-03-17 RX ADMIN — Medication SCH EACH: at 09:49

## 2016-03-17 RX ADMIN — Medication SCH MG: at 09:47

## 2016-03-17 RX ADMIN — CLOBETASOL PROPIONATE SCH APPLIC: 0.5 CREAM TOPICAL at 09:50

## 2016-03-17 RX ADMIN — Medication SCH UNIT: at 09:48

## 2016-03-17 RX ADMIN — LEVOTHYROXINE SODIUM SCH MCG: 75 TABLET ORAL at 09:48

## 2016-03-18 RX ADMIN — Medication SCH EACH: at 09:37

## 2016-03-18 RX ADMIN — Medication SCH EACH: at 18:53

## 2016-03-18 RX ADMIN — Medication SCH UNIT: at 09:38

## 2016-03-18 RX ADMIN — CLOBETASOL PROPIONATE SCH APPLIC: 0.5 CREAM TOPICAL at 18:54

## 2016-03-18 RX ADMIN — MAGNESIUM OXIDE TAB 400 MG (241.3 MG ELEMENTAL MG) SCH TAB: 400 (241.3 MG) TAB at 18:52

## 2016-03-18 RX ADMIN — Medication SCH MG: at 18:53

## 2016-03-18 RX ADMIN — CLOBETASOL PROPIONATE SCH APPLIC: 0.5 CREAM TOPICAL at 09:39

## 2016-03-18 RX ADMIN — LEVOTHYROXINE SODIUM SCH MCG: 75 TABLET ORAL at 06:23

## 2016-03-18 RX ADMIN — Medication SCH MG: at 09:38

## 2016-03-18 RX ADMIN — Medication SCH EACH: at 18:52

## 2016-03-19 RX ADMIN — Medication SCH EACH: at 09:22

## 2016-03-19 RX ADMIN — Medication SCH MG: at 18:43

## 2016-03-19 RX ADMIN — Medication SCH UNIT: at 09:22

## 2016-03-19 RX ADMIN — Medication SCH EACH: at 18:44

## 2016-03-19 RX ADMIN — MAGNESIUM OXIDE TAB 400 MG (241.3 MG ELEMENTAL MG) SCH TAB: 400 (241.3 MG) TAB at 18:44

## 2016-03-19 RX ADMIN — Medication SCH EACH: at 18:43

## 2016-03-19 RX ADMIN — Medication SCH MG: at 09:22

## 2016-03-19 RX ADMIN — CLOBETASOL PROPIONATE SCH APPLIC: 0.5 CREAM TOPICAL at 09:23

## 2016-03-19 RX ADMIN — Medication SCH MG: at 18:44

## 2016-03-19 RX ADMIN — CLOBETASOL PROPIONATE SCH APPLIC: 0.5 CREAM TOPICAL at 18:45

## 2016-03-20 RX ADMIN — CLOBETASOL PROPIONATE SCH APPLIC: 0.5 CREAM TOPICAL at 08:47

## 2016-03-20 RX ADMIN — MAGNESIUM OXIDE TAB 400 MG (241.3 MG ELEMENTAL MG) SCH TAB: 400 (241.3 MG) TAB at 18:32

## 2016-03-20 RX ADMIN — Medication SCH MG: at 08:45

## 2016-03-20 RX ADMIN — Medication SCH EACH: at 18:32

## 2016-03-20 RX ADMIN — LEVOTHYROXINE SODIUM SCH: 75 TABLET ORAL at 06:58

## 2016-03-20 RX ADMIN — Medication SCH EACH: at 08:45

## 2016-03-20 RX ADMIN — Medication SCH EACH: at 18:31

## 2016-03-20 RX ADMIN — Medication SCH MG: at 18:32

## 2016-03-20 RX ADMIN — Medication SCH UNIT: at 08:46

## 2016-03-20 RX ADMIN — CLOBETASOL PROPIONATE SCH APPLIC: 0.5 CREAM TOPICAL at 18:33

## 2016-03-20 RX ADMIN — LEVOTHYROXINE SODIUM SCH MCG: 75 TABLET ORAL at 05:45

## 2016-03-21 RX ADMIN — Medication SCH MG: at 18:04

## 2016-03-21 RX ADMIN — Medication SCH EACH: at 18:04

## 2016-03-21 RX ADMIN — Medication SCH UNIT: at 09:23

## 2016-03-21 RX ADMIN — CLOBETASOL PROPIONATE SCH APPLIC: 0.5 CREAM TOPICAL at 09:23

## 2016-03-21 RX ADMIN — Medication SCH EACH: at 09:23

## 2016-03-21 RX ADMIN — Medication SCH MG: at 09:23

## 2016-03-21 RX ADMIN — MAGNESIUM OXIDE TAB 400 MG (241.3 MG ELEMENTAL MG) SCH TAB: 400 (241.3 MG) TAB at 18:04

## 2016-03-21 RX ADMIN — LEVOTHYROXINE SODIUM SCH MCG: 75 TABLET ORAL at 06:44

## 2016-03-21 RX ADMIN — Medication SCH EACH: at 18:03

## 2016-03-21 NOTE — PN
Progress Note for JENIFER SHEETS  Date:  03/21/2016  Room #:  VM.207

 

SUBJECTIVE:  This is an 86-year-old seen today for followup on vaginal

irritation.  She has been on clobetasol salves vaginally twice daily since the

last 2 weeks.  She was having more irritation last week.  The nurse contacted

me, but I was out of town.  The patient says it is getting better since they

have been putting the cream in the right place.  Otherwise, she has no concerns.

 

OBJECTIVE:  Vital Signs:  Her temperature is 97.2, pulse 60, blood pressure

130/72, respiratory rate 20, and O2 is 98% on room air.

General:  She is in no acute distress.

Heart:  Irregularly irregular without murmur.

Lungs:  Sounds are clear to auscultation bilaterally without crackles or

wheezes.

Abdomen:  Positive bowel sounds.  Soft and nontender.

Extremities:  Warm and dry.  No edema.

Mental Status:  Alert and orientated x3.

Genitalia:  Her vaginal and vulvar exam show her to have some swollen vulva with

minimal redness, but no drainage.  Atrophic changes noted.  Vaginal exam not

performed.  There is no speculum. Discussed with the patient if this does not

improve, she will have to come over to the clinic for a proper pelvic exam.

 

ASSESSMENT:

1. Atrophic vaginitis:  She has completed 2 weeks of clobetasol.  We will

    change that to p.r.n. for irritation and get her started on Premarin cream

    twice weekly.

2. Atrial fibrillation with previous transient ischemic attack, on Pradaxa.

3. Mild thrombocytopenia.  We will repeat a CBC in the next 1 to 2 months.

4. Chronic leg weakness.

5. Osteoarthritis.  Her pain is controlled.

6. Essential hypertension, controlled.

 

PLAN:  At this point, we will continue swing bed cares.  I will see her for

recertification in 30 days or sooner for a vaginal exam in the clinic, if

indicated.  We will change her clobetasol to p.r.n. and start her on Premarin

cream.

 

MKA:  03/21/2016 10:32:37  MODL:  03/21/2016 11:03:43

Job #:  [id number]

## 2016-03-22 RX ADMIN — Medication SCH MG: at 18:10

## 2016-03-22 RX ADMIN — Medication SCH MG: at 09:57

## 2016-03-22 RX ADMIN — Medication SCH EACH: at 09:58

## 2016-03-22 RX ADMIN — Medication SCH UNIT: at 09:57

## 2016-03-22 RX ADMIN — LEVOTHYROXINE SODIUM SCH MCG: 75 TABLET ORAL at 06:35

## 2016-03-22 RX ADMIN — MAGNESIUM OXIDE TAB 400 MG (241.3 MG ELEMENTAL MG) SCH TAB: 400 (241.3 MG) TAB at 18:10

## 2016-03-22 RX ADMIN — Medication SCH EACH: at 09:57

## 2016-03-22 RX ADMIN — Medication SCH EACH: at 18:10

## 2016-03-23 RX ADMIN — Medication SCH EACH: at 18:52

## 2016-03-23 RX ADMIN — MAGNESIUM OXIDE TAB 400 MG (241.3 MG ELEMENTAL MG) SCH TAB: 400 (241.3 MG) TAB at 18:52

## 2016-03-23 RX ADMIN — Medication SCH UNIT: at 08:59

## 2016-03-23 RX ADMIN — LEVOTHYROXINE SODIUM SCH MCG: 75 TABLET ORAL at 06:15

## 2016-03-23 RX ADMIN — Medication SCH EACH: at 08:59

## 2016-03-23 RX ADMIN — Medication SCH MG: at 08:59

## 2016-03-23 RX ADMIN — Medication SCH MG: at 18:52

## 2016-03-24 RX ADMIN — Medication SCH MG: at 18:01

## 2016-03-24 RX ADMIN — Medication SCH EACH: at 09:01

## 2016-03-24 RX ADMIN — Medication SCH MG: at 09:01

## 2016-03-24 RX ADMIN — Medication SCH UNIT: at 09:01

## 2016-03-24 RX ADMIN — MAGNESIUM OXIDE TAB 400 MG (241.3 MG ELEMENTAL MG) SCH TAB: 400 (241.3 MG) TAB at 18:01

## 2016-03-24 RX ADMIN — LEVOTHYROXINE SODIUM SCH MCG: 75 TABLET ORAL at 06:07

## 2016-03-24 RX ADMIN — Medication SCH EACH: at 18:01

## 2016-03-24 RX ADMIN — Medication SCH EACH: at 18:02

## 2016-03-25 RX ADMIN — Medication SCH EACH: at 21:41

## 2016-03-25 RX ADMIN — Medication SCH EACH: at 08:54

## 2016-03-25 RX ADMIN — Medication SCH UNIT: at 08:53

## 2016-03-25 RX ADMIN — Medication SCH MG: at 08:53

## 2016-03-25 RX ADMIN — Medication SCH MG: at 21:42

## 2016-03-25 RX ADMIN — Medication SCH MG: at 21:41

## 2016-03-25 RX ADMIN — LEVOTHYROXINE SODIUM SCH MCG: 75 TABLET ORAL at 08:53

## 2016-03-25 RX ADMIN — Medication SCH EACH: at 21:40

## 2016-03-25 RX ADMIN — MAGNESIUM OXIDE TAB 400 MG (241.3 MG ELEMENTAL MG) SCH TAB: 400 (241.3 MG) TAB at 21:42

## 2016-03-26 RX ADMIN — Medication SCH MG: at 09:10

## 2016-03-26 RX ADMIN — Medication SCH MG: at 18:06

## 2016-03-26 RX ADMIN — Medication SCH EACH: at 18:06

## 2016-03-26 RX ADMIN — Medication SCH EACH: at 09:10

## 2016-03-26 RX ADMIN — MAGNESIUM OXIDE TAB 400 MG (241.3 MG ELEMENTAL MG) SCH TAB: 400 (241.3 MG) TAB at 18:06

## 2016-03-26 RX ADMIN — Medication SCH UNIT: at 09:10

## 2016-03-27 RX ADMIN — MAGNESIUM OXIDE TAB 400 MG (241.3 MG ELEMENTAL MG) SCH TAB: 400 (241.3 MG) TAB at 21:04

## 2016-03-27 RX ADMIN — Medication SCH MG: at 08:56

## 2016-03-27 RX ADMIN — Medication SCH MG: at 21:04

## 2016-03-27 RX ADMIN — Medication SCH MG: at 21:03

## 2016-03-27 RX ADMIN — Medication SCH EACH: at 08:55

## 2016-03-27 RX ADMIN — Medication SCH EACH: at 21:03

## 2016-03-27 RX ADMIN — Medication SCH UNIT: at 08:56

## 2016-03-27 RX ADMIN — LEVOTHYROXINE SODIUM SCH MCG: 75 TABLET ORAL at 05:59

## 2016-03-28 RX ADMIN — Medication SCH MG: at 09:09

## 2016-03-28 RX ADMIN — Medication SCH MG: at 18:03

## 2016-03-28 RX ADMIN — Medication SCH UNIT: at 09:09

## 2016-03-28 RX ADMIN — MAGNESIUM OXIDE TAB 400 MG (241.3 MG ELEMENTAL MG) SCH TAB: 400 (241.3 MG) TAB at 18:04

## 2016-03-28 RX ADMIN — Medication SCH EACH: at 09:10

## 2016-03-28 RX ADMIN — Medication SCH EACH: at 09:09

## 2016-03-28 RX ADMIN — Medication SCH EACH: at 18:03

## 2016-03-28 RX ADMIN — LEVOTHYROXINE SODIUM SCH: 75 TABLET ORAL at 06:02

## 2016-03-28 RX ADMIN — Medication SCH EACH: at 18:04

## 2016-03-28 RX ADMIN — LEVOTHYROXINE SODIUM SCH MCG: 75 TABLET ORAL at 05:49

## 2016-03-28 RX ADMIN — Medication SCH MG: at 18:04

## 2016-03-29 RX ADMIN — Medication SCH MG: at 10:56

## 2016-03-29 RX ADMIN — Medication SCH EACH: at 19:29

## 2016-03-29 RX ADMIN — LEVOTHYROXINE SODIUM SCH MCG: 75 TABLET ORAL at 06:54

## 2016-03-29 RX ADMIN — Medication SCH MG: at 19:28

## 2016-03-29 RX ADMIN — Medication SCH EACH: at 19:28

## 2016-03-29 RX ADMIN — MAGNESIUM OXIDE TAB 400 MG (241.3 MG ELEMENTAL MG) SCH TAB: 400 (241.3 MG) TAB at 19:28

## 2016-03-29 RX ADMIN — Medication SCH UNIT: at 11:00

## 2016-03-29 RX ADMIN — Medication SCH EACH: at 10:57

## 2016-03-30 RX ADMIN — Medication SCH EACH: at 18:02

## 2016-03-30 RX ADMIN — Medication SCH MG: at 18:02

## 2016-03-30 RX ADMIN — Medication SCH UNIT: at 09:44

## 2016-03-30 RX ADMIN — LEVOTHYROXINE SODIUM SCH MCG: 75 TABLET ORAL at 05:45

## 2016-03-30 RX ADMIN — Medication SCH MG: at 18:03

## 2016-03-30 RX ADMIN — Medication SCH EACH: at 09:44

## 2016-03-30 RX ADMIN — LEVOTHYROXINE SODIUM SCH: 75 TABLET ORAL at 06:35

## 2016-03-30 RX ADMIN — Medication SCH MG: at 09:44

## 2016-03-30 RX ADMIN — MAGNESIUM OXIDE TAB 400 MG (241.3 MG ELEMENTAL MG) SCH TAB: 400 (241.3 MG) TAB at 18:03

## 2016-03-31 RX ADMIN — Medication SCH MG: at 18:25

## 2016-03-31 RX ADMIN — Medication SCH EACH: at 18:24

## 2016-03-31 RX ADMIN — LEVOTHYROXINE SODIUM SCH MCG: 75 TABLET ORAL at 06:15

## 2016-03-31 RX ADMIN — MAGNESIUM OXIDE TAB 400 MG (241.3 MG ELEMENTAL MG) SCH TAB: 400 (241.3 MG) TAB at 18:26

## 2016-03-31 RX ADMIN — Medication SCH EACH: at 18:26

## 2016-03-31 RX ADMIN — Medication SCH EACH: at 08:57

## 2016-03-31 RX ADMIN — Medication SCH UNIT: at 08:56

## 2016-03-31 RX ADMIN — Medication SCH EACH: at 08:56

## 2016-03-31 RX ADMIN — Medication SCH MG: at 08:56

## 2016-04-01 RX ADMIN — Medication SCH MG: at 18:53

## 2016-04-01 RX ADMIN — Medication SCH MG: at 18:51

## 2016-04-01 RX ADMIN — LEVOTHYROXINE SODIUM SCH MCG: 75 TABLET ORAL at 05:40

## 2016-04-01 RX ADMIN — MAGNESIUM OXIDE TAB 400 MG (241.3 MG ELEMENTAL MG) SCH TAB: 400 (241.3 MG) TAB at 18:51

## 2016-04-01 RX ADMIN — Medication SCH EACH: at 18:50

## 2016-04-01 RX ADMIN — Medication SCH UNIT: at 09:52

## 2016-04-01 RX ADMIN — Medication SCH MG: at 09:51

## 2016-04-01 RX ADMIN — Medication SCH EACH: at 09:51

## 2016-04-01 RX ADMIN — Medication SCH EACH: at 18:51

## 2016-04-01 RX ADMIN — LEVOTHYROXINE SODIUM SCH: 75 TABLET ORAL at 06:27

## 2016-04-02 RX ADMIN — Medication SCH MG: at 18:17

## 2016-04-02 RX ADMIN — Medication SCH MG: at 09:17

## 2016-04-02 RX ADMIN — Medication SCH MG: at 18:16

## 2016-04-02 RX ADMIN — Medication SCH EACH: at 09:18

## 2016-04-02 RX ADMIN — Medication SCH UNIT: at 09:17

## 2016-04-02 RX ADMIN — Medication SCH EACH: at 18:16

## 2016-04-02 RX ADMIN — MAGNESIUM OXIDE TAB 400 MG (241.3 MG ELEMENTAL MG) SCH TAB: 400 (241.3 MG) TAB at 18:17

## 2016-04-03 RX ADMIN — MAGNESIUM OXIDE TAB 400 MG (241.3 MG ELEMENTAL MG) SCH TAB: 400 (241.3 MG) TAB at 18:04

## 2016-04-03 RX ADMIN — Medication SCH EACH: at 09:06

## 2016-04-03 RX ADMIN — Medication SCH EACH: at 18:03

## 2016-04-03 RX ADMIN — Medication SCH EACH: at 18:04

## 2016-04-03 RX ADMIN — Medication SCH UNIT: at 09:08

## 2016-04-03 RX ADMIN — Medication SCH MG: at 18:04

## 2016-04-03 RX ADMIN — LEVOTHYROXINE SODIUM SCH MCG: 75 TABLET ORAL at 06:33

## 2016-04-03 RX ADMIN — Medication SCH MG: at 09:07

## 2016-04-04 RX ADMIN — Medication SCH MG: at 18:26

## 2016-04-04 RX ADMIN — Medication SCH EACH: at 09:32

## 2016-04-04 RX ADMIN — Medication SCH UNIT: at 09:31

## 2016-04-04 RX ADMIN — LEVOTHYROXINE SODIUM SCH MCG: 75 TABLET ORAL at 06:40

## 2016-04-04 RX ADMIN — Medication SCH MG: at 09:32

## 2016-04-04 RX ADMIN — Medication SCH EACH: at 18:26

## 2016-04-04 RX ADMIN — MAGNESIUM OXIDE TAB 400 MG (241.3 MG ELEMENTAL MG) SCH TAB: 400 (241.3 MG) TAB at 18:26

## 2016-04-05 RX ADMIN — Medication SCH MG: at 18:01

## 2016-04-05 RX ADMIN — Medication SCH EACH: at 09:27

## 2016-04-05 RX ADMIN — Medication SCH EACH: at 18:02

## 2016-04-05 RX ADMIN — LEVOTHYROXINE SODIUM SCH: 75 TABLET ORAL at 06:20

## 2016-04-05 RX ADMIN — MAGNESIUM OXIDE TAB 400 MG (241.3 MG ELEMENTAL MG) SCH TAB: 400 (241.3 MG) TAB at 18:02

## 2016-04-05 RX ADMIN — Medication SCH UNIT: at 09:26

## 2016-04-05 RX ADMIN — Medication SCH EACH: at 18:01

## 2016-04-05 RX ADMIN — LEVOTHYROXINE SODIUM SCH MCG: 75 TABLET ORAL at 05:29

## 2016-04-05 RX ADMIN — Medication SCH MG: at 09:27

## 2016-04-06 RX ADMIN — MAGNESIUM OXIDE TAB 400 MG (241.3 MG ELEMENTAL MG) SCH TAB: 400 (241.3 MG) TAB at 18:13

## 2016-04-06 RX ADMIN — Medication SCH UNIT: at 09:29

## 2016-04-06 RX ADMIN — Medication SCH MG: at 18:13

## 2016-04-06 RX ADMIN — Medication SCH MG: at 09:29

## 2016-04-06 RX ADMIN — LEVOTHYROXINE SODIUM SCH MCG: 75 TABLET ORAL at 06:16

## 2016-04-06 RX ADMIN — Medication SCH EACH: at 09:29

## 2016-04-06 RX ADMIN — Medication SCH EACH: at 18:13

## 2016-04-07 RX ADMIN — Medication SCH EACH: at 09:21

## 2016-04-07 RX ADMIN — Medication SCH MG: at 09:21

## 2016-04-07 RX ADMIN — Medication SCH EACH: at 18:33

## 2016-04-07 RX ADMIN — LEVOTHYROXINE SODIUM SCH MCG: 75 TABLET ORAL at 06:57

## 2016-04-07 RX ADMIN — Medication SCH MG: at 18:33

## 2016-04-07 RX ADMIN — MAGNESIUM OXIDE TAB 400 MG (241.3 MG ELEMENTAL MG) SCH TAB: 400 (241.3 MG) TAB at 18:33

## 2016-04-07 RX ADMIN — Medication SCH UNIT: at 09:21

## 2016-04-07 RX ADMIN — Medication SCH MG: at 18:32

## 2016-04-08 LAB
CHLORIDE SERPL-SCNC: 103 MMOL/L (ref 98–107)
SODIUM SERPL-SCNC: 141 MMOL/L (ref 136–145)

## 2016-04-08 RX ADMIN — LEVOTHYROXINE SODIUM SCH MCG: 75 TABLET ORAL at 06:22

## 2016-04-08 RX ADMIN — Medication SCH MG: at 09:25

## 2016-04-08 RX ADMIN — MAGNESIUM OXIDE TAB 400 MG (241.3 MG ELEMENTAL MG) SCH TAB: 400 (241.3 MG) TAB at 18:33

## 2016-04-08 RX ADMIN — Medication SCH EACH: at 09:25

## 2016-04-08 RX ADMIN — Medication SCH EACH: at 18:33

## 2016-04-08 RX ADMIN — Medication SCH MG: at 18:33

## 2016-04-08 RX ADMIN — Medication SCH UNIT: at 09:25

## 2016-04-09 RX ADMIN — Medication SCH MG: at 09:13

## 2016-04-09 RX ADMIN — Medication SCH EACH: at 09:12

## 2016-04-09 RX ADMIN — Medication SCH EACH: at 18:24

## 2016-04-09 RX ADMIN — MAGNESIUM OXIDE TAB 400 MG (241.3 MG ELEMENTAL MG) SCH TAB: 400 (241.3 MG) TAB at 18:24

## 2016-04-09 RX ADMIN — Medication SCH EACH: at 09:14

## 2016-04-09 RX ADMIN — Medication SCH EACH: at 18:23

## 2016-04-09 RX ADMIN — Medication SCH UNIT: at 09:13

## 2016-04-09 RX ADMIN — Medication SCH MG: at 18:23

## 2016-04-09 RX ADMIN — CLOBETASOL PROPIONATE PRN APPLIC: 0.5 CREAM TOPICAL at 09:27

## 2016-04-10 RX ADMIN — Medication SCH EACH: at 08:54

## 2016-04-10 RX ADMIN — Medication SCH MG: at 08:54

## 2016-04-10 RX ADMIN — MAGNESIUM OXIDE TAB 400 MG (241.3 MG ELEMENTAL MG) SCH TAB: 400 (241.3 MG) TAB at 18:19

## 2016-04-10 RX ADMIN — Medication SCH EACH: at 18:19

## 2016-04-10 RX ADMIN — Medication SCH UNIT: at 08:54

## 2016-04-10 RX ADMIN — CLOBETASOL PROPIONATE PRN APPLIC: 0.5 CREAM TOPICAL at 08:55

## 2016-04-10 RX ADMIN — Medication SCH EACH: at 18:18

## 2016-04-10 RX ADMIN — Medication SCH MG: at 18:19

## 2016-04-10 RX ADMIN — LEVOTHYROXINE SODIUM SCH MCG: 75 TABLET ORAL at 05:56

## 2016-04-10 RX ADMIN — LEVOTHYROXINE SODIUM SCH MCG: 75 TABLET ORAL at 08:54

## 2016-04-11 RX ADMIN — CLOBETASOL PROPIONATE PRN APPLIC: 0.5 CREAM TOPICAL at 15:31

## 2016-04-11 RX ADMIN — Medication SCH UNIT: at 10:13

## 2016-04-11 RX ADMIN — Medication SCH MG: at 19:24

## 2016-04-11 RX ADMIN — LEVOTHYROXINE SODIUM SCH MCG: 75 TABLET ORAL at 06:05

## 2016-04-11 RX ADMIN — MAGNESIUM OXIDE TAB 400 MG (241.3 MG ELEMENTAL MG) SCH TAB: 400 (241.3 MG) TAB at 19:23

## 2016-04-11 RX ADMIN — Medication SCH EACH: at 19:23

## 2016-04-11 RX ADMIN — Medication SCH MG: at 19:23

## 2016-04-11 RX ADMIN — Medication SCH EACH: at 19:24

## 2016-04-11 RX ADMIN — Medication SCH EACH: at 10:13

## 2016-04-11 RX ADMIN — Medication SCH MG: at 10:14

## 2016-04-11 NOTE — PN
Progress Note for JENIFER SHEETS  Date:  04/08/2016  Room #:  VM.207

 

SUBJECTIVE:  This is an 86-year-old seen today for recertification.  The patient

does have some left toe pain, sort of comes and goes, feels kind of stiff.  It

has been going on about a month and has not been red or swollen.  She has not

had any history of gout.  Otherwise, she denies any injury.  She has no other

concerns.  Her vaginal irritation was much better.  No longer burning, but she

sort of feeling some irritation come back.

 

OBJECTIVE:  Vital Signs:  Temperature is 97.8, pulse 86, blood pressure 110/69,

respiratory rate 18, and O2 of 95% on room air.

General:  She is in no acute distress.

Heart:  Irregularly irregular.

Lungs:  Sounds are clear to auscultation bilaterally without crackles or

wheezes.

Extremities:  Warm and dry.  No edema.  Left foot and toe was examined, 2+

dorsal pedis pulses.  No swelling.  She has a small ganglion feeling lump over

the 5th MTP joint.  There is no redness or warmth to suggest infection.

Mental Status:  She is alert and orientated x3..

 

ASSESSMENT:

1. Toe pain possibly related to a ganglion cyst.  At this point, it is not

    bothering her that much.  Tylenol is available p.r.n. I think we will just

    continue to watch things.

2. Atrophic vaginitis on Premarin cream twice weekly.  I will go ahead and

    start using the p.r.n. clobetasol again for irritation.

3. Atrial fibrillation with previous transient ischemic attack, on Pradaxa.

4. Mild thrombocytopenia.

5. Chronic leg weakness.

6. Osteoarthritis.

7. Hypothyroidism.

8. Essential hypertension, controlled.

 

PLAN:  At this point, the patient will continue swing bed cares.  We will check

lab work.  We will check a uric acid as well, just to ensure that there is not

any potential for gout given her toe pain, recertify in 30 days.

 

 

MKA:  04/08/2016 12:58:56  MODL:  04/08/2016 13:15:42

Job #:  [id number]

## 2016-04-12 RX ADMIN — Medication SCH UNIT: at 09:07

## 2016-04-12 RX ADMIN — Medication SCH EACH: at 09:07

## 2016-04-12 RX ADMIN — Medication SCH MG: at 09:07

## 2016-04-12 RX ADMIN — Medication SCH EACH: at 18:16

## 2016-04-12 RX ADMIN — Medication SCH EACH: at 18:15

## 2016-04-12 RX ADMIN — Medication SCH MG: at 18:15

## 2016-04-12 RX ADMIN — LEVOTHYROXINE SODIUM SCH MCG: 75 TABLET ORAL at 06:48

## 2016-04-12 RX ADMIN — MAGNESIUM OXIDE TAB 400 MG (241.3 MG ELEMENTAL MG) SCH TAB: 400 (241.3 MG) TAB at 18:16

## 2016-04-12 RX ADMIN — Medication SCH MG: at 18:16

## 2016-04-12 RX ADMIN — Medication SCH EACH: at 09:10

## 2016-04-13 RX ADMIN — Medication SCH UNIT: at 09:29

## 2016-04-13 RX ADMIN — MAGNESIUM OXIDE TAB 400 MG (241.3 MG ELEMENTAL MG) SCH TAB: 400 (241.3 MG) TAB at 20:06

## 2016-04-13 RX ADMIN — Medication SCH MG: at 20:05

## 2016-04-13 RX ADMIN — Medication SCH EACH: at 20:04

## 2016-04-13 RX ADMIN — Medication SCH MG: at 20:06

## 2016-04-13 RX ADMIN — Medication SCH EACH: at 09:28

## 2016-04-13 RX ADMIN — LEVOTHYROXINE SODIUM SCH MCG: 75 TABLET ORAL at 09:28

## 2016-04-13 RX ADMIN — Medication SCH MG: at 09:28

## 2016-04-13 RX ADMIN — Medication SCH EACH: at 20:05

## 2016-04-13 RX ADMIN — CLOBETASOL PROPIONATE PRN APPLIC: 0.5 CREAM TOPICAL at 09:32

## 2016-04-14 RX ADMIN — MAGNESIUM OXIDE TAB 400 MG (241.3 MG ELEMENTAL MG) SCH TAB: 400 (241.3 MG) TAB at 18:19

## 2016-04-14 RX ADMIN — Medication SCH MG: at 18:19

## 2016-04-14 RX ADMIN — Medication SCH MG: at 18:21

## 2016-04-14 RX ADMIN — Medication SCH EACH: at 18:18

## 2016-04-14 RX ADMIN — Medication SCH UNIT: at 09:42

## 2016-04-14 RX ADMIN — Medication SCH EACH: at 09:43

## 2016-04-14 RX ADMIN — Medication SCH EACH: at 18:19

## 2016-04-14 RX ADMIN — Medication SCH MG: at 09:44

## 2016-04-14 RX ADMIN — LEVOTHYROXINE SODIUM SCH MCG: 75 TABLET ORAL at 06:36

## 2016-04-15 RX ADMIN — Medication SCH EACH: at 08:54

## 2016-04-15 RX ADMIN — MAGNESIUM OXIDE TAB 400 MG (241.3 MG ELEMENTAL MG) SCH TAB: 400 (241.3 MG) TAB at 18:07

## 2016-04-15 RX ADMIN — Medication SCH EACH: at 18:07

## 2016-04-15 RX ADMIN — LEVOTHYROXINE SODIUM SCH MCG: 75 TABLET ORAL at 06:21

## 2016-04-15 RX ADMIN — Medication SCH MG: at 08:54

## 2016-04-15 RX ADMIN — Medication SCH UNIT: at 08:54

## 2016-04-15 RX ADMIN — CLOBETASOL PROPIONATE PRN APPLIC: 0.5 CREAM TOPICAL at 18:10

## 2016-04-15 RX ADMIN — Medication SCH MG: at 18:06

## 2016-04-16 RX ADMIN — Medication SCH EACH: at 09:40

## 2016-04-16 RX ADMIN — Medication SCH MG: at 18:40

## 2016-04-16 RX ADMIN — MAGNESIUM OXIDE TAB 400 MG (241.3 MG ELEMENTAL MG) SCH TAB: 400 (241.3 MG) TAB at 18:40

## 2016-04-16 RX ADMIN — Medication SCH MG: at 09:41

## 2016-04-16 RX ADMIN — Medication SCH EACH: at 18:41

## 2016-04-16 RX ADMIN — Medication SCH UNIT: at 09:40

## 2016-04-16 RX ADMIN — Medication SCH EACH: at 18:40

## 2016-04-17 RX ADMIN — LEVOTHYROXINE SODIUM SCH MCG: 75 TABLET ORAL at 06:11

## 2016-04-17 RX ADMIN — Medication SCH MG: at 09:49

## 2016-04-17 RX ADMIN — Medication SCH MG: at 18:18

## 2016-04-17 RX ADMIN — MAGNESIUM OXIDE TAB 400 MG (241.3 MG ELEMENTAL MG) SCH TAB: 400 (241.3 MG) TAB at 18:18

## 2016-04-17 RX ADMIN — Medication SCH UNIT: at 09:49

## 2016-04-17 RX ADMIN — Medication SCH EACH: at 18:18

## 2016-04-17 RX ADMIN — Medication SCH EACH: at 09:48

## 2016-04-18 RX ADMIN — Medication SCH MG: at 18:33

## 2016-04-18 RX ADMIN — LEVOTHYROXINE SODIUM SCH MCG: 75 TABLET ORAL at 06:53

## 2016-04-18 RX ADMIN — Medication SCH EACH: at 18:33

## 2016-04-18 RX ADMIN — Medication SCH EACH: at 18:32

## 2016-04-18 RX ADMIN — Medication SCH EACH: at 09:21

## 2016-04-18 RX ADMIN — Medication SCH UNIT: at 09:21

## 2016-04-18 RX ADMIN — Medication SCH EACH: at 09:24

## 2016-04-18 RX ADMIN — Medication SCH MG: at 18:34

## 2016-04-18 RX ADMIN — MAGNESIUM OXIDE TAB 400 MG (241.3 MG ELEMENTAL MG) SCH TAB: 400 (241.3 MG) TAB at 18:33

## 2016-04-18 RX ADMIN — Medication SCH MG: at 09:22

## 2016-04-19 RX ADMIN — Medication SCH EACH: at 18:21

## 2016-04-19 RX ADMIN — Medication SCH MG: at 09:12

## 2016-04-19 RX ADMIN — Medication SCH UNIT: at 09:11

## 2016-04-19 RX ADMIN — Medication SCH MG: at 18:19

## 2016-04-19 RX ADMIN — Medication SCH EACH: at 09:10

## 2016-04-19 RX ADMIN — LEVOTHYROXINE SODIUM SCH MCG: 75 TABLET ORAL at 06:28

## 2016-04-19 RX ADMIN — MAGNESIUM OXIDE TAB 400 MG (241.3 MG ELEMENTAL MG) SCH TAB: 400 (241.3 MG) TAB at 18:19

## 2016-04-19 RX ADMIN — Medication SCH EACH: at 18:18

## 2016-04-20 RX ADMIN — Medication SCH EACH: at 08:43

## 2016-04-20 RX ADMIN — LEVOTHYROXINE SODIUM SCH MCG: 75 TABLET ORAL at 06:44

## 2016-04-20 RX ADMIN — Medication SCH EACH: at 18:05

## 2016-04-20 RX ADMIN — Medication SCH UNIT: at 08:43

## 2016-04-20 RX ADMIN — Medication SCH MG: at 18:06

## 2016-04-20 RX ADMIN — Medication SCH MG: at 08:44

## 2016-04-20 RX ADMIN — CLOBETASOL PROPIONATE PRN APPLIC: 0.5 CREAM TOPICAL at 08:44

## 2016-04-20 RX ADMIN — MAGNESIUM OXIDE TAB 400 MG (241.3 MG ELEMENTAL MG) SCH TAB: 400 (241.3 MG) TAB at 18:06

## 2016-04-20 RX ADMIN — Medication SCH EACH: at 18:07

## 2016-04-21 RX ADMIN — LEVOTHYROXINE SODIUM SCH MCG: 75 TABLET ORAL at 06:04

## 2016-04-21 RX ADMIN — Medication SCH MG: at 18:32

## 2016-04-21 RX ADMIN — Medication SCH EACH: at 09:21

## 2016-04-21 RX ADMIN — Medication SCH EACH: at 18:35

## 2016-04-21 RX ADMIN — MAGNESIUM OXIDE TAB 400 MG (241.3 MG ELEMENTAL MG) SCH TAB: 400 (241.3 MG) TAB at 18:32

## 2016-04-21 RX ADMIN — Medication SCH MG: at 18:34

## 2016-04-21 RX ADMIN — Medication SCH MG: at 09:26

## 2016-04-21 RX ADMIN — Medication SCH EACH: at 09:27

## 2016-04-21 RX ADMIN — Medication SCH EACH: at 18:32

## 2016-04-21 RX ADMIN — Medication SCH UNIT: at 09:20

## 2016-04-22 RX ADMIN — LEVOTHYROXINE SODIUM SCH: 75 TABLET ORAL at 06:01

## 2016-04-22 RX ADMIN — CLOBETASOL PROPIONATE PRN APPLIC: 0.5 CREAM TOPICAL at 09:22

## 2016-04-22 RX ADMIN — Medication SCH MG: at 18:08

## 2016-04-22 RX ADMIN — Medication SCH EACH: at 18:06

## 2016-04-22 RX ADMIN — Medication SCH MG: at 09:20

## 2016-04-22 RX ADMIN — Medication SCH MG: at 18:07

## 2016-04-22 RX ADMIN — LEVOTHYROXINE SODIUM SCH MCG: 75 TABLET ORAL at 05:55

## 2016-04-22 RX ADMIN — Medication SCH EACH: at 18:07

## 2016-04-22 RX ADMIN — Medication SCH EACH: at 09:19

## 2016-04-22 RX ADMIN — MAGNESIUM OXIDE TAB 400 MG (241.3 MG ELEMENTAL MG) SCH TAB: 400 (241.3 MG) TAB at 18:08

## 2016-04-22 RX ADMIN — Medication SCH UNIT: at 09:19

## 2016-04-23 RX ADMIN — Medication SCH MG: at 09:40

## 2016-04-23 RX ADMIN — Medication SCH UNIT: at 09:40

## 2016-04-23 RX ADMIN — Medication SCH EACH: at 18:32

## 2016-04-23 RX ADMIN — Medication SCH MG: at 18:32

## 2016-04-23 RX ADMIN — MAGNESIUM OXIDE TAB 400 MG (241.3 MG ELEMENTAL MG) SCH TAB: 400 (241.3 MG) TAB at 18:32

## 2016-04-23 RX ADMIN — Medication SCH MG: at 18:35

## 2016-04-23 RX ADMIN — CLOBETASOL PROPIONATE PRN APPLIC: 0.5 CREAM TOPICAL at 14:30

## 2016-04-23 RX ADMIN — Medication SCH EACH: at 09:39

## 2016-04-24 RX ADMIN — LEVOTHYROXINE SODIUM SCH MCG: 75 TABLET ORAL at 06:05

## 2016-04-24 RX ADMIN — Medication SCH MG: at 18:49

## 2016-04-24 RX ADMIN — Medication SCH EACH: at 08:50

## 2016-04-24 RX ADMIN — MAGNESIUM OXIDE TAB 400 MG (241.3 MG ELEMENTAL MG) SCH TAB: 400 (241.3 MG) TAB at 18:49

## 2016-04-24 RX ADMIN — Medication SCH UNIT: at 08:50

## 2016-04-24 RX ADMIN — Medication SCH EACH: at 09:43

## 2016-04-24 RX ADMIN — Medication SCH EACH: at 18:49

## 2016-04-24 RX ADMIN — CLOBETASOL PROPIONATE PRN APPLIC: 0.5 CREAM TOPICAL at 08:50

## 2016-04-24 RX ADMIN — Medication SCH MG: at 08:50

## 2016-04-25 RX ADMIN — Medication SCH UNIT: at 10:37

## 2016-04-25 RX ADMIN — Medication SCH EACH: at 10:36

## 2016-04-25 RX ADMIN — MAGNESIUM OXIDE TAB 400 MG (241.3 MG ELEMENTAL MG) SCH TAB: 400 (241.3 MG) TAB at 19:02

## 2016-04-25 RX ADMIN — Medication SCH MG: at 10:37

## 2016-04-25 RX ADMIN — Medication SCH EACH: at 19:02

## 2016-04-25 RX ADMIN — LEVOTHYROXINE SODIUM SCH MCG: 75 TABLET ORAL at 06:09

## 2016-04-25 RX ADMIN — Medication SCH MG: at 19:02

## 2016-04-25 RX ADMIN — CLOBETASOL PROPIONATE PRN APPLIC: 0.5 CREAM TOPICAL at 10:40

## 2016-04-27 RX ADMIN — LEVOTHYROXINE SODIUM SCH MCG: 75 TABLET ORAL at 06:00

## 2016-04-27 RX ADMIN — Medication SCH EACH: at 09:44

## 2016-04-27 RX ADMIN — Medication SCH MG: at 09:44

## 2016-04-27 RX ADMIN — Medication SCH MG: at 18:49

## 2016-04-27 RX ADMIN — Medication SCH MG: at 18:50

## 2016-04-27 RX ADMIN — LEVOTHYROXINE SODIUM SCH: 75 TABLET ORAL at 02:21

## 2016-04-27 RX ADMIN — Medication SCH EACH: at 18:50

## 2016-04-27 RX ADMIN — Medication SCH: at 02:21

## 2016-04-27 RX ADMIN — Medication SCH UNIT: at 09:43

## 2016-04-27 RX ADMIN — MAGNESIUM OXIDE TAB 400 MG (241.3 MG ELEMENTAL MG) SCH: 400 (241.3 MG) TAB at 02:22

## 2016-04-27 RX ADMIN — MAGNESIUM OXIDE TAB 400 MG (241.3 MG ELEMENTAL MG) SCH TAB: 400 (241.3 MG) TAB at 18:50

## 2016-04-27 RX ADMIN — Medication SCH: at 02:22

## 2016-04-28 RX ADMIN — Medication SCH MG: at 18:23

## 2016-04-28 RX ADMIN — Medication SCH MG: at 10:01

## 2016-04-28 RX ADMIN — Medication SCH EACH: at 18:23

## 2016-04-28 RX ADMIN — MAGNESIUM OXIDE TAB 400 MG (241.3 MG ELEMENTAL MG) SCH TAB: 400 (241.3 MG) TAB at 18:24

## 2016-04-28 RX ADMIN — Medication SCH EACH: at 18:24

## 2016-04-28 RX ADMIN — Medication SCH UNIT: at 10:01

## 2016-04-28 RX ADMIN — Medication SCH MG: at 18:24

## 2016-04-28 RX ADMIN — Medication SCH EACH: at 10:02

## 2016-04-28 RX ADMIN — LEVOTHYROXINE SODIUM SCH MCG: 75 TABLET ORAL at 06:30

## 2016-04-28 RX ADMIN — CLOBETASOL PROPIONATE PRN APPLIC: 0.5 CREAM TOPICAL at 10:05

## 2016-04-29 RX ADMIN — Medication SCH MG: at 09:00

## 2016-04-29 RX ADMIN — Medication SCH UNIT: at 09:00

## 2016-04-29 RX ADMIN — MAGNESIUM OXIDE TAB 400 MG (241.3 MG ELEMENTAL MG) SCH TAB: 400 (241.3 MG) TAB at 18:23

## 2016-04-29 RX ADMIN — Medication SCH EACH: at 08:59

## 2016-04-29 RX ADMIN — LEVOTHYROXINE SODIUM SCH MCG: 75 TABLET ORAL at 06:56

## 2016-04-29 RX ADMIN — Medication SCH EACH: at 18:23

## 2016-04-29 RX ADMIN — Medication SCH MG: at 18:23

## 2016-04-29 RX ADMIN — Medication SCH MG: at 18:22

## 2016-04-29 RX ADMIN — Medication SCH EACH: at 18:22

## 2016-04-30 RX ADMIN — Medication SCH MG: at 18:21

## 2016-04-30 RX ADMIN — Medication SCH UNIT: at 09:31

## 2016-04-30 RX ADMIN — Medication SCH MG: at 09:31

## 2016-04-30 RX ADMIN — Medication SCH MG: at 18:20

## 2016-04-30 RX ADMIN — Medication SCH EACH: at 09:30

## 2016-04-30 RX ADMIN — Medication SCH EACH: at 18:20

## 2016-04-30 RX ADMIN — Medication SCH EACH: at 09:33

## 2016-04-30 RX ADMIN — Medication SCH EACH: at 18:21

## 2016-04-30 RX ADMIN — MAGNESIUM OXIDE TAB 400 MG (241.3 MG ELEMENTAL MG) SCH TAB: 400 (241.3 MG) TAB at 18:21

## 2016-05-01 RX ADMIN — LEVOTHYROXINE SODIUM SCH MCG: 75 TABLET ORAL at 06:15

## 2016-05-01 RX ADMIN — Medication SCH UNIT: at 08:48

## 2016-05-01 RX ADMIN — Medication SCH MG: at 08:47

## 2016-05-01 RX ADMIN — Medication SCH EACH: at 18:20

## 2016-05-01 RX ADMIN — Medication SCH EACH: at 08:47

## 2016-05-01 RX ADMIN — Medication SCH MG: at 18:21

## 2016-05-01 RX ADMIN — Medication SCH EACH: at 18:21

## 2016-05-01 RX ADMIN — Medication SCH MG: at 18:22

## 2016-05-01 RX ADMIN — MAGNESIUM OXIDE TAB 400 MG (241.3 MG ELEMENTAL MG) SCH TAB: 400 (241.3 MG) TAB at 18:21

## 2016-05-02 RX ADMIN — CLOBETASOL PROPIONATE PRN APPLIC: 0.5 CREAM TOPICAL at 21:24

## 2016-05-02 RX ADMIN — Medication SCH EACH: at 18:29

## 2016-05-02 RX ADMIN — Medication SCH UNIT: at 08:41

## 2016-05-02 RX ADMIN — Medication SCH EACH: at 08:41

## 2016-05-02 RX ADMIN — Medication SCH MG: at 18:31

## 2016-05-02 RX ADMIN — Medication SCH MG: at 18:30

## 2016-05-02 RX ADMIN — LEVOTHYROXINE SODIUM SCH MCG: 75 TABLET ORAL at 06:20

## 2016-05-02 RX ADMIN — Medication SCH MG: at 08:42

## 2016-05-02 RX ADMIN — Medication SCH EACH: at 18:32

## 2016-05-02 RX ADMIN — MAGNESIUM OXIDE TAB 400 MG (241.3 MG ELEMENTAL MG) SCH TAB: 400 (241.3 MG) TAB at 18:31

## 2016-05-03 RX ADMIN — Medication SCH MG: at 18:21

## 2016-05-03 RX ADMIN — MAGNESIUM OXIDE TAB 400 MG (241.3 MG ELEMENTAL MG) SCH TAB: 400 (241.3 MG) TAB at 18:21

## 2016-05-03 RX ADMIN — Medication SCH MG: at 18:22

## 2016-05-03 RX ADMIN — Medication SCH UNIT: at 09:24

## 2016-05-03 RX ADMIN — Medication SCH EACH: at 18:22

## 2016-05-03 RX ADMIN — Medication SCH EACH: at 09:24

## 2016-05-03 RX ADMIN — LEVOTHYROXINE SODIUM SCH MCG: 75 TABLET ORAL at 06:42

## 2016-05-03 RX ADMIN — Medication SCH EACH: at 18:21

## 2016-05-03 RX ADMIN — Medication SCH MG: at 09:24

## 2016-05-04 RX ADMIN — LEVOTHYROXINE SODIUM SCH MCG: 75 TABLET ORAL at 06:41

## 2016-05-04 RX ADMIN — Medication SCH EACH: at 18:03

## 2016-05-04 RX ADMIN — MAGNESIUM OXIDE TAB 400 MG (241.3 MG ELEMENTAL MG) SCH TAB: 400 (241.3 MG) TAB at 18:03

## 2016-05-04 RX ADMIN — Medication SCH MG: at 18:04

## 2016-05-04 RX ADMIN — Medication SCH EACH: at 18:06

## 2016-05-04 RX ADMIN — Medication SCH UNIT: at 09:44

## 2016-05-04 RX ADMIN — Medication SCH MG: at 09:45

## 2016-05-04 RX ADMIN — Medication SCH EACH: at 09:44

## 2016-05-05 RX ADMIN — Medication SCH UNIT: at 09:45

## 2016-05-05 RX ADMIN — Medication SCH MG: at 09:46

## 2016-05-05 RX ADMIN — LEVOTHYROXINE SODIUM SCH MCG: 75 TABLET ORAL at 06:46

## 2016-05-05 RX ADMIN — MAGNESIUM OXIDE TAB 400 MG (241.3 MG ELEMENTAL MG) SCH TAB: 400 (241.3 MG) TAB at 19:15

## 2016-05-05 RX ADMIN — Medication SCH EACH: at 19:17

## 2016-05-05 RX ADMIN — Medication SCH MG: at 19:16

## 2016-05-05 RX ADMIN — Medication SCH EACH: at 19:15

## 2016-05-05 RX ADMIN — Medication SCH EACH: at 09:45

## 2016-05-06 RX ADMIN — Medication SCH EACH: at 18:01

## 2016-05-06 RX ADMIN — MAGNESIUM OXIDE TAB 400 MG (241.3 MG ELEMENTAL MG) SCH TAB: 400 (241.3 MG) TAB at 18:01

## 2016-05-06 RX ADMIN — LEVOTHYROXINE SODIUM SCH MCG: 75 TABLET ORAL at 06:14

## 2016-05-06 RX ADMIN — Medication SCH EACH: at 09:19

## 2016-05-06 RX ADMIN — Medication SCH MG: at 09:19

## 2016-05-06 RX ADMIN — Medication SCH MG: at 18:00

## 2016-05-06 RX ADMIN — Medication SCH EACH: at 09:20

## 2016-05-06 RX ADMIN — Medication SCH UNIT: at 09:18

## 2016-05-06 RX ADMIN — Medication SCH MG: at 18:01

## 2016-05-06 RX ADMIN — Medication SCH EACH: at 18:00

## 2016-05-07 RX ADMIN — Medication SCH MG: at 22:14

## 2016-05-07 RX ADMIN — Medication SCH MG: at 08:56

## 2016-05-07 RX ADMIN — CLOBETASOL PROPIONATE PRN APPLIC: 0.5 CREAM TOPICAL at 09:04

## 2016-05-07 RX ADMIN — Medication SCH EACH: at 08:56

## 2016-05-07 RX ADMIN — Medication SCH EACH: at 22:13

## 2016-05-07 RX ADMIN — Medication SCH MG: at 22:13

## 2016-05-07 RX ADMIN — Medication SCH EACH: at 22:12

## 2016-05-07 RX ADMIN — Medication SCH UNIT: at 08:56

## 2016-05-07 RX ADMIN — MAGNESIUM OXIDE TAB 400 MG (241.3 MG ELEMENTAL MG) SCH TAB: 400 (241.3 MG) TAB at 22:14

## 2016-05-08 RX ADMIN — MAGNESIUM OXIDE TAB 400 MG (241.3 MG ELEMENTAL MG) SCH TAB: 400 (241.3 MG) TAB at 18:52

## 2016-05-08 RX ADMIN — Medication SCH MG: at 10:25

## 2016-05-08 RX ADMIN — Medication SCH MG: at 18:51

## 2016-05-08 RX ADMIN — Medication SCH MG: at 18:52

## 2016-05-08 RX ADMIN — Medication SCH UNIT: at 10:24

## 2016-05-08 RX ADMIN — LEVOTHYROXINE SODIUM SCH MCG: 75 TABLET ORAL at 06:12

## 2016-05-08 RX ADMIN — Medication SCH EACH: at 18:52

## 2016-05-08 RX ADMIN — Medication SCH EACH: at 10:22

## 2016-05-09 RX ADMIN — Medication SCH EACH: at 18:17

## 2016-05-09 RX ADMIN — Medication SCH EACH: at 08:51

## 2016-05-09 RX ADMIN — Medication SCH MG: at 08:52

## 2016-05-09 RX ADMIN — Medication SCH EACH: at 08:55

## 2016-05-09 RX ADMIN — Medication SCH UNIT: at 08:51

## 2016-05-09 RX ADMIN — Medication SCH MG: at 18:18

## 2016-05-09 RX ADMIN — MAGNESIUM OXIDE TAB 400 MG (241.3 MG ELEMENTAL MG) SCH TAB: 400 (241.3 MG) TAB at 18:18

## 2016-05-09 RX ADMIN — LEVOTHYROXINE SODIUM SCH MCG: 75 TABLET ORAL at 06:10

## 2016-05-09 RX ADMIN — Medication SCH MG: at 18:17

## 2016-05-10 RX ADMIN — Medication SCH EACH: at 18:17

## 2016-05-10 RX ADMIN — Medication SCH MG: at 18:18

## 2016-05-10 RX ADMIN — Medication SCH EACH: at 08:59

## 2016-05-10 RX ADMIN — Medication SCH EACH: at 18:18

## 2016-05-10 RX ADMIN — Medication SCH MG: at 09:00

## 2016-05-10 RX ADMIN — LEVOTHYROXINE SODIUM SCH MCG: 75 TABLET ORAL at 06:20

## 2016-05-10 RX ADMIN — MAGNESIUM OXIDE TAB 400 MG (241.3 MG ELEMENTAL MG) SCH TAB: 400 (241.3 MG) TAB at 18:17

## 2016-05-10 RX ADMIN — Medication SCH UNIT: at 09:00

## 2016-05-11 RX ADMIN — Medication SCH EACH: at 09:17

## 2016-05-11 RX ADMIN — Medication SCH MG: at 09:17

## 2016-05-11 RX ADMIN — Medication SCH UNIT: at 09:18

## 2016-05-11 RX ADMIN — Medication SCH MG: at 18:17

## 2016-05-11 RX ADMIN — Medication SCH MG: at 18:18

## 2016-05-11 RX ADMIN — LEVOTHYROXINE SODIUM SCH MCG: 75 TABLET ORAL at 06:17

## 2016-05-11 RX ADMIN — Medication SCH EACH: at 18:17

## 2016-05-11 RX ADMIN — Medication SCH EACH: at 18:16

## 2016-05-11 RX ADMIN — MAGNESIUM OXIDE TAB 400 MG (241.3 MG ELEMENTAL MG) SCH TAB: 400 (241.3 MG) TAB at 18:17

## 2016-05-12 RX ADMIN — Medication SCH EACH: at 18:46

## 2016-05-12 RX ADMIN — Medication SCH EACH: at 09:02

## 2016-05-12 RX ADMIN — MAGNESIUM OXIDE TAB 400 MG (241.3 MG ELEMENTAL MG) SCH TAB: 400 (241.3 MG) TAB at 18:43

## 2016-05-12 RX ADMIN — LEVOTHYROXINE SODIUM SCH MCG: 75 TABLET ORAL at 06:39

## 2016-05-12 RX ADMIN — Medication SCH UNIT: at 09:02

## 2016-05-12 RX ADMIN — Medication SCH MG: at 18:42

## 2016-05-12 RX ADMIN — Medication SCH MG: at 18:48

## 2016-05-12 RX ADMIN — Medication SCH EACH: at 09:06

## 2016-05-12 RX ADMIN — Medication SCH MG: at 09:02

## 2016-05-12 RX ADMIN — Medication SCH EACH: at 18:47

## 2016-05-12 NOTE — PN
Progress Note for JENIFER SHEETS  Date:  05/12/2016  Room #:  VM.207

 

This is an 86-year-old on swing bed for non-skilled care.  She has been here

since last October.  She has done well but she has had some vaginal irritation

since January.  At first, she used some barrier cream and then tried some

clobetasol which initially helped and now has been on maintenance Premarin twice

weekly.  The burning is getting worse again.  She has now been using clobetasol

again.  She gets some relief from it but then it comes back.  Otherwise, she has

no other concerns other than it is getting harder to walk with her arthritis.

She is breathing well with no cough or chest pain.

 

OBJECTIVE:  Vital Signs:  Her temperature is 97.7, pulse 59, blood pressure

131/59, respiratory rate 18, and O2 of 95% on room air.

General:  She is in no acute distress.

Heart:  Regular rate and rhythm today.

Respiratory:  Lung sounds are clear to auscultation without crackles or wheezes.

Abdomen:  Positive bowel sounds.  Soft and nontender.

Extremities:  Warm and dry.  No edema.

Mental Status:  She is alert and orientated x3.

GYN:  Externally does show her to have some new ulcers noted to both vulva.

There is redness in the area.  There is no discharge.  She also reports some

irritation spreading back towards her buttocks.  I did not see any ulcers in

that area.

 

ASSESSMENT:

1. Atrophic vaginitis, failing Premarin cream and clobetasol.  At this point,

    I am concerned that she might be actually having something different like a

    lichen sclerosus.  I am going to refer her to Gynecology for further

    evaluation.

2. Atrial fibrillation with previous transient ischemic attack, doing well on

    Pradaxa.

3. History of mild thrombocytopenia; chronic leg weakness; osteoarthritis;

    hypothyroidism; and essential hypertension, controlled.

 

PLAN:  At this point, the patient will continue with swing bed cares.  She had

lab work last month in April that was all acceptable.  No lab work due.  She

will see Gynecology for the

vaginal and vulvar concerns; until then, she is going to continue on with the

Premarin cream and clobetasol.

 

 

MKA:  05/12/2016 08:37:25  MODL:  05/12/2016 08:51:55

Job #:  [id number]

## 2016-05-13 RX ADMIN — MAGNESIUM OXIDE TAB 400 MG (241.3 MG ELEMENTAL MG) SCH TAB: 400 (241.3 MG) TAB at 18:45

## 2016-05-13 RX ADMIN — Medication SCH MG: at 18:46

## 2016-05-13 RX ADMIN — Medication SCH EACH: at 18:45

## 2016-05-13 RX ADMIN — Medication SCH UNIT: at 09:31

## 2016-05-13 RX ADMIN — Medication SCH EACH: at 09:32

## 2016-05-13 RX ADMIN — Medication SCH EACH: at 18:46

## 2016-05-13 RX ADMIN — LEVOTHYROXINE SODIUM SCH MCG: 75 TABLET ORAL at 06:04

## 2016-05-13 RX ADMIN — Medication SCH MG: at 09:31

## 2016-05-14 RX ADMIN — Medication SCH MG: at 18:31

## 2016-05-14 RX ADMIN — Medication SCH EACH: at 09:55

## 2016-05-14 RX ADMIN — Medication SCH UNIT: at 09:53

## 2016-05-14 RX ADMIN — MAGNESIUM OXIDE TAB 400 MG (241.3 MG ELEMENTAL MG) SCH TAB: 400 (241.3 MG) TAB at 18:31

## 2016-05-14 RX ADMIN — Medication SCH EACH: at 18:31

## 2016-05-14 RX ADMIN — Medication SCH MG: at 09:54

## 2016-05-15 RX ADMIN — Medication SCH MG: at 18:32

## 2016-05-15 RX ADMIN — Medication SCH EACH: at 09:21

## 2016-05-15 RX ADMIN — Medication SCH UNIT: at 09:20

## 2016-05-15 RX ADMIN — MAGNESIUM OXIDE TAB 400 MG (241.3 MG ELEMENTAL MG) SCH TAB: 400 (241.3 MG) TAB at 18:32

## 2016-05-15 RX ADMIN — LEVOTHYROXINE SODIUM SCH MCG: 75 TABLET ORAL at 06:33

## 2016-05-15 RX ADMIN — Medication SCH EACH: at 18:31

## 2016-05-15 RX ADMIN — Medication SCH MG: at 09:21

## 2016-05-15 RX ADMIN — Medication SCH EACH: at 09:23

## 2016-05-16 RX ADMIN — Medication SCH MG: at 18:21

## 2016-05-16 RX ADMIN — LEVOTHYROXINE SODIUM SCH MCG: 75 TABLET ORAL at 06:04

## 2016-05-16 RX ADMIN — Medication SCH EACH: at 09:23

## 2016-05-16 RX ADMIN — Medication SCH EACH: at 18:20

## 2016-05-16 RX ADMIN — Medication SCH MG: at 18:20

## 2016-05-16 RX ADMIN — MAGNESIUM OXIDE TAB 400 MG (241.3 MG ELEMENTAL MG) SCH TAB: 400 (241.3 MG) TAB at 18:21

## 2016-05-16 RX ADMIN — Medication SCH EACH: at 18:21

## 2016-05-16 RX ADMIN — Medication SCH MG: at 09:23

## 2016-05-16 RX ADMIN — Medication SCH UNIT: at 09:23

## 2016-05-17 RX ADMIN — Medication SCH EACH: at 19:23

## 2016-05-17 RX ADMIN — LEVOTHYROXINE SODIUM SCH MCG: 75 TABLET ORAL at 06:21

## 2016-05-17 RX ADMIN — Medication SCH EACH: at 09:10

## 2016-05-17 RX ADMIN — Medication SCH UNIT: at 09:11

## 2016-05-17 RX ADMIN — Medication SCH EACH: at 19:22

## 2016-05-17 RX ADMIN — Medication SCH MG: at 09:11

## 2016-05-17 RX ADMIN — Medication SCH MG: at 19:23

## 2016-05-17 RX ADMIN — Medication SCH MG: at 19:22

## 2016-05-17 RX ADMIN — MAGNESIUM OXIDE TAB 400 MG (241.3 MG ELEMENTAL MG) SCH TAB: 400 (241.3 MG) TAB at 19:23

## 2016-05-18 RX ADMIN — Medication SCH MG: at 09:56

## 2016-05-18 RX ADMIN — Medication SCH UNIT: at 09:57

## 2016-05-18 RX ADMIN — Medication SCH MG: at 19:22

## 2016-05-18 RX ADMIN — Medication SCH EACH: at 09:57

## 2016-05-18 RX ADMIN — Medication SCH EACH: at 19:23

## 2016-05-18 RX ADMIN — LEVOTHYROXINE SODIUM SCH MCG: 75 TABLET ORAL at 06:08

## 2016-05-18 RX ADMIN — MAGNESIUM OXIDE TAB 400 MG (241.3 MG ELEMENTAL MG) SCH TAB: 400 (241.3 MG) TAB at 19:23

## 2016-05-19 RX ADMIN — LEVOTHYROXINE SODIUM SCH MCG: 75 TABLET ORAL at 06:01

## 2016-05-19 RX ADMIN — Medication SCH UNIT: at 10:08

## 2016-05-19 RX ADMIN — MAGNESIUM OXIDE TAB 400 MG (241.3 MG ELEMENTAL MG) SCH TAB: 400 (241.3 MG) TAB at 18:47

## 2016-05-19 RX ADMIN — Medication SCH EACH: at 10:07

## 2016-05-19 RX ADMIN — Medication SCH MG: at 10:08

## 2016-05-19 RX ADMIN — Medication SCH MG: at 18:47

## 2016-05-19 RX ADMIN — CLOBETASOL PROPIONATE PRN APPLIC: 0.5 CREAM TOPICAL at 10:10

## 2016-05-19 RX ADMIN — Medication SCH EACH: at 18:47

## 2016-05-19 RX ADMIN — Medication SCH EACH: at 18:46

## 2016-05-20 RX ADMIN — Medication SCH EACH: at 18:50

## 2016-05-20 RX ADMIN — Medication SCH MG: at 18:49

## 2016-05-20 RX ADMIN — LEVOTHYROXINE SODIUM SCH MCG: 75 TABLET ORAL at 06:05

## 2016-05-20 RX ADMIN — MAGNESIUM OXIDE TAB 400 MG (241.3 MG ELEMENTAL MG) SCH TAB: 400 (241.3 MG) TAB at 18:48

## 2016-05-20 RX ADMIN — Medication SCH EACH: at 09:25

## 2016-05-20 RX ADMIN — Medication SCH UNIT: at 09:25

## 2016-05-20 RX ADMIN — Medication SCH MG: at 09:25

## 2016-05-20 RX ADMIN — Medication SCH MG: at 18:50

## 2016-05-20 RX ADMIN — Medication SCH EACH: at 18:49

## 2016-05-21 RX ADMIN — Medication SCH MG: at 18:06

## 2016-05-21 RX ADMIN — Medication SCH UNIT: at 09:40

## 2016-05-21 RX ADMIN — Medication SCH EACH: at 18:08

## 2016-05-21 RX ADMIN — Medication SCH EACH: at 09:39

## 2016-05-21 RX ADMIN — Medication SCH EACH: at 18:07

## 2016-05-21 RX ADMIN — Medication SCH MG: at 09:37

## 2016-05-21 RX ADMIN — MAGNESIUM OXIDE TAB 400 MG (241.3 MG ELEMENTAL MG) SCH TAB: 400 (241.3 MG) TAB at 18:07

## 2016-05-21 RX ADMIN — CLOBETASOL PROPIONATE PRN APPLIC: 0.5 CREAM TOPICAL at 09:42

## 2016-05-21 RX ADMIN — Medication SCH MG: at 18:07

## 2016-05-21 RX ADMIN — Medication SCH EACH: at 09:40

## 2016-05-22 RX ADMIN — Medication SCH UNIT: at 09:07

## 2016-05-22 RX ADMIN — MAGNESIUM OXIDE TAB 400 MG (241.3 MG ELEMENTAL MG) SCH TAB: 400 (241.3 MG) TAB at 18:22

## 2016-05-22 RX ADMIN — LEVOTHYROXINE SODIUM SCH MCG: 75 TABLET ORAL at 06:16

## 2016-05-22 RX ADMIN — Medication SCH EACH: at 18:27

## 2016-05-22 RX ADMIN — Medication SCH MG: at 18:22

## 2016-05-22 RX ADMIN — Medication SCH MG: at 18:23

## 2016-05-22 RX ADMIN — Medication SCH EACH: at 18:22

## 2016-05-22 RX ADMIN — CLOBETASOL PROPIONATE PRN APPLIC: 0.5 CREAM TOPICAL at 09:09

## 2016-05-22 RX ADMIN — Medication SCH EACH: at 09:07

## 2016-05-22 RX ADMIN — Medication SCH MG: at 09:07

## 2016-05-23 RX ADMIN — Medication SCH EACH: at 18:26

## 2016-05-23 RX ADMIN — Medication SCH UNIT: at 09:58

## 2016-05-23 RX ADMIN — Medication SCH EACH: at 09:58

## 2016-05-23 RX ADMIN — Medication SCH MG: at 18:26

## 2016-05-23 RX ADMIN — MAGNESIUM OXIDE TAB 400 MG (241.3 MG ELEMENTAL MG) SCH TAB: 400 (241.3 MG) TAB at 18:26

## 2016-05-23 RX ADMIN — LEVOTHYROXINE SODIUM SCH MCG: 75 TABLET ORAL at 06:39

## 2016-05-23 RX ADMIN — Medication SCH MG: at 09:58

## 2016-05-24 RX ADMIN — Medication SCH EACH: at 09:37

## 2016-05-24 RX ADMIN — Medication SCH MG: at 18:00

## 2016-05-24 RX ADMIN — Medication SCH EACH: at 09:39

## 2016-05-24 RX ADMIN — LEVOTHYROXINE SODIUM SCH MCG: 75 TABLET ORAL at 06:31

## 2016-05-24 RX ADMIN — Medication SCH UNIT: at 09:37

## 2016-05-24 RX ADMIN — Medication SCH EACH: at 18:02

## 2016-05-24 RX ADMIN — MAGNESIUM OXIDE TAB 400 MG (241.3 MG ELEMENTAL MG) SCH TAB: 400 (241.3 MG) TAB at 18:02

## 2016-05-24 RX ADMIN — Medication SCH EACH: at 18:00

## 2016-05-24 RX ADMIN — Medication SCH MG: at 18:03

## 2016-05-24 RX ADMIN — Medication SCH MG: at 09:38

## 2016-05-25 RX ADMIN — Medication SCH EACH: at 09:25

## 2016-05-25 RX ADMIN — Medication SCH UNIT: at 09:24

## 2016-05-25 RX ADMIN — Medication SCH MG: at 18:08

## 2016-05-25 RX ADMIN — MAGNESIUM OXIDE TAB 400 MG (241.3 MG ELEMENTAL MG) SCH TAB: 400 (241.3 MG) TAB at 18:08

## 2016-05-25 RX ADMIN — LEVOTHYROXINE SODIUM SCH MCG: 75 TABLET ORAL at 06:30

## 2016-05-25 RX ADMIN — Medication SCH MG: at 09:23

## 2016-05-25 RX ADMIN — Medication SCH EACH: at 18:08

## 2016-05-26 RX ADMIN — LEVOTHYROXINE SODIUM SCH MCG: 75 TABLET ORAL at 06:02

## 2016-05-26 RX ADMIN — Medication SCH EACH: at 18:44

## 2016-05-26 RX ADMIN — Medication SCH MG: at 18:45

## 2016-05-26 RX ADMIN — Medication SCH EACH: at 10:36

## 2016-05-26 RX ADMIN — Medication SCH MG: at 10:37

## 2016-05-26 RX ADMIN — Medication SCH UNIT: at 10:36

## 2016-05-26 RX ADMIN — CLOBETASOL PROPIONATE PRN APPLIC: 0.5 CREAM TOPICAL at 10:38

## 2016-05-26 RX ADMIN — Medication SCH EACH: at 18:45

## 2016-05-26 RX ADMIN — MAGNESIUM OXIDE TAB 400 MG (241.3 MG ELEMENTAL MG) SCH TAB: 400 (241.3 MG) TAB at 18:45

## 2016-05-26 RX ADMIN — Medication SCH MG: at 18:46

## 2016-05-27 RX ADMIN — Medication SCH EACH: at 09:43

## 2016-05-27 RX ADMIN — LEVOTHYROXINE SODIUM SCH MCG: 75 TABLET ORAL at 06:14

## 2016-05-27 RX ADMIN — Medication SCH EACH: at 18:07

## 2016-05-27 RX ADMIN — Medication SCH MG: at 09:40

## 2016-05-27 RX ADMIN — MAGNESIUM OXIDE TAB 400 MG (241.3 MG ELEMENTAL MG) SCH TAB: 400 (241.3 MG) TAB at 18:07

## 2016-05-27 RX ADMIN — Medication SCH UNIT: at 09:40

## 2016-05-27 RX ADMIN — Medication SCH MG: at 18:08

## 2016-05-27 RX ADMIN — Medication SCH EACH: at 18:06

## 2016-05-27 RX ADMIN — Medication SCH EACH: at 09:40

## 2016-05-28 RX ADMIN — Medication SCH EACH: at 09:26

## 2016-05-28 RX ADMIN — Medication SCH MG: at 09:27

## 2016-05-28 RX ADMIN — Medication SCH EACH: at 18:02

## 2016-05-28 RX ADMIN — Medication SCH MG: at 18:03

## 2016-05-28 RX ADMIN — MAGNESIUM OXIDE TAB 400 MG (241.3 MG ELEMENTAL MG) SCH TAB: 400 (241.3 MG) TAB at 18:03

## 2016-05-28 RX ADMIN — Medication SCH EACH: at 18:03

## 2016-05-28 RX ADMIN — Medication SCH UNIT: at 09:26

## 2016-05-29 RX ADMIN — Medication SCH MG: at 18:23

## 2016-05-29 RX ADMIN — Medication SCH UNIT: at 09:26

## 2016-05-29 RX ADMIN — Medication SCH EACH: at 18:23

## 2016-05-29 RX ADMIN — LEVOTHYROXINE SODIUM SCH MCG: 75 TABLET ORAL at 06:41

## 2016-05-29 RX ADMIN — Medication SCH EACH: at 09:27

## 2016-05-29 RX ADMIN — Medication SCH MG: at 18:22

## 2016-05-29 RX ADMIN — MAGNESIUM OXIDE TAB 400 MG (241.3 MG ELEMENTAL MG) SCH TAB: 400 (241.3 MG) TAB at 18:22

## 2016-05-29 RX ADMIN — Medication SCH MG: at 09:27

## 2016-05-29 RX ADMIN — Medication SCH EACH: at 18:22

## 2016-05-30 RX ADMIN — Medication SCH MG: at 18:19

## 2016-05-30 RX ADMIN — Medication SCH EACH: at 18:18

## 2016-05-30 RX ADMIN — MAGNESIUM OXIDE TAB 400 MG (241.3 MG ELEMENTAL MG) SCH TAB: 400 (241.3 MG) TAB at 18:21

## 2016-05-30 RX ADMIN — Medication SCH MG: at 09:52

## 2016-05-30 RX ADMIN — Medication SCH UNIT: at 09:52

## 2016-05-30 RX ADMIN — Medication SCH EACH: at 09:52

## 2016-05-30 RX ADMIN — Medication SCH EACH: at 18:19

## 2016-05-30 RX ADMIN — LEVOTHYROXINE SODIUM SCH MCG: 75 TABLET ORAL at 06:31

## 2016-05-30 RX ADMIN — Medication SCH EACH: at 09:53

## 2016-05-31 RX ADMIN — Medication SCH EACH: at 09:57

## 2016-05-31 RX ADMIN — Medication SCH EACH: at 17:58

## 2016-05-31 RX ADMIN — Medication SCH MG: at 17:59

## 2016-05-31 RX ADMIN — MAGNESIUM OXIDE TAB 400 MG (241.3 MG ELEMENTAL MG) SCH TAB: 400 (241.3 MG) TAB at 17:59

## 2016-05-31 RX ADMIN — Medication SCH EACH: at 17:59

## 2016-05-31 RX ADMIN — Medication SCH MG: at 09:57

## 2016-05-31 RX ADMIN — Medication SCH UNIT: at 09:57

## 2016-05-31 RX ADMIN — LEVOTHYROXINE SODIUM SCH MCG: 75 TABLET ORAL at 06:57

## 2016-06-01 RX ADMIN — Medication SCH EACH: at 09:52

## 2016-06-01 RX ADMIN — Medication SCH EACH: at 18:18

## 2016-06-01 RX ADMIN — LEVOTHYROXINE SODIUM SCH MCG: 75 TABLET ORAL at 06:04

## 2016-06-01 RX ADMIN — Medication SCH UNIT: at 09:53

## 2016-06-01 RX ADMIN — Medication SCH MG: at 18:18

## 2016-06-01 RX ADMIN — Medication SCH EACH: at 18:16

## 2016-06-01 RX ADMIN — MAGNESIUM OXIDE TAB 400 MG (241.3 MG ELEMENTAL MG) SCH TAB: 400 (241.3 MG) TAB at 18:18

## 2016-06-01 RX ADMIN — Medication SCH MG: at 09:53

## 2016-06-01 RX ADMIN — Medication SCH MG: at 18:17

## 2016-06-02 RX ADMIN — Medication SCH MG: at 20:05

## 2016-06-02 RX ADMIN — Medication SCH EACH: at 09:12

## 2016-06-02 RX ADMIN — Medication SCH MG: at 20:06

## 2016-06-02 RX ADMIN — LEVOTHYROXINE SODIUM SCH MCG: 75 TABLET ORAL at 06:17

## 2016-06-02 RX ADMIN — Medication SCH EACH: at 09:08

## 2016-06-02 RX ADMIN — MAGNESIUM OXIDE TAB 400 MG (241.3 MG ELEMENTAL MG) SCH TAB: 400 (241.3 MG) TAB at 20:06

## 2016-06-02 RX ADMIN — Medication SCH UNIT: at 09:09

## 2016-06-02 RX ADMIN — Medication SCH EACH: at 20:04

## 2016-06-02 RX ADMIN — Medication SCH MG: at 09:08

## 2016-06-02 RX ADMIN — Medication SCH EACH: at 20:06

## 2016-06-03 RX ADMIN — Medication SCH MG: at 18:01

## 2016-06-03 RX ADMIN — Medication SCH EACH: at 18:03

## 2016-06-03 RX ADMIN — Medication SCH EACH: at 09:25

## 2016-06-03 RX ADMIN — MAGNESIUM OXIDE TAB 400 MG (241.3 MG ELEMENTAL MG) SCH TAB: 400 (241.3 MG) TAB at 18:02

## 2016-06-03 RX ADMIN — Medication SCH UNIT: at 09:25

## 2016-06-03 RX ADMIN — Medication SCH MG: at 18:02

## 2016-06-03 RX ADMIN — Medication SCH MG: at 09:26

## 2016-06-03 RX ADMIN — Medication SCH EACH: at 18:01

## 2016-06-03 RX ADMIN — LEVOTHYROXINE SODIUM SCH MCG: 75 TABLET ORAL at 06:45

## 2016-06-03 NOTE — PN
Progress Note for JENIFER SHEETS  Date:  06/03/2016  Room #:  VM.207

 

SUBJECTIVE:  This is an 86-year-old on swing bed for non-skilled care since last

October.  Her vaginal irritation has been better, but a little bit worse again

today.  She has been using barrier creams, clobetasol, Premarin every 3 days.

She otherwise is having some red blotchy rashes that come and go.  They do not

bother her that much.  It has been going on for a while.  It is not getting any

worse.  She has no chest pain, no cough, no shortness of breath.

 

OBJECTIVE:  VITAL SIGNS:  Temperature is 98, pulse 95, blood pressure 123/73,

respiratory rate 18, and O2 of 95% on room air.

General:  She is in no acute distress.

Heart:  Irregularly irregular.

Lungs:  Sounds are clear to auscultation bilaterally without any crackles or

wheezes.

Extremities:  Warm and dry.  No edema.

Neurologic:  Mental status, alert and orientated x3.

GYN:  Externally, she has no ulcers.  She has some mild redness and atrophy.

She has no vulvar edema.

 

ASSESSMENT:

1. Atrophic vaginitis.  She has a consult with gynecology coming up on the

    20th.  She is still using barrier cream, Premarin, and clobetasol.

2. Atrial fibrillation with previous transient ischemic attack, on Pradaxa.

3. Mild thrombocytopenia; chronic leg weakness; severe osteoarthritis,

    especially in the knees; hypothyroidism; and essential hypertension,

    controlled.

 

PLAN:  At this point, the patient will continue swing bed cares.  I am going to

try doing baths for her in the tub twice weekly instead of once weekly.  No lab

work is due.  Follow up with gynecology.  Recertify in 1 month.

 

 

MKA:  06/03/2016 09:21:18  MODL:  06/03/2016 09:53:15

Job #:  [id number]

## 2016-06-04 RX ADMIN — Medication SCH MG: at 09:30

## 2016-06-04 RX ADMIN — Medication SCH MG: at 18:20

## 2016-06-04 RX ADMIN — Medication SCH UNIT: at 09:30

## 2016-06-04 RX ADMIN — MAGNESIUM OXIDE TAB 400 MG (241.3 MG ELEMENTAL MG) SCH TAB: 400 (241.3 MG) TAB at 18:19

## 2016-06-04 RX ADMIN — Medication SCH EACH: at 09:29

## 2016-06-04 RX ADMIN — Medication SCH EACH: at 18:19

## 2016-06-04 RX ADMIN — Medication SCH EACH: at 18:20

## 2016-06-05 RX ADMIN — Medication SCH EACH: at 18:16

## 2016-06-05 RX ADMIN — LEVOTHYROXINE SODIUM SCH MCG: 75 TABLET ORAL at 06:23

## 2016-06-05 RX ADMIN — MAGNESIUM OXIDE TAB 400 MG (241.3 MG ELEMENTAL MG) SCH TAB: 400 (241.3 MG) TAB at 18:17

## 2016-06-05 RX ADMIN — Medication SCH UNIT: at 08:58

## 2016-06-05 RX ADMIN — Medication SCH MG: at 18:16

## 2016-06-05 RX ADMIN — Medication SCH MG: at 08:58

## 2016-06-05 RX ADMIN — CLOBETASOL PROPIONATE PRN APPLIC: 0.5 CREAM TOPICAL at 08:59

## 2016-06-05 RX ADMIN — Medication SCH EACH: at 08:57

## 2016-06-05 RX ADMIN — Medication SCH MG: at 18:17

## 2016-06-05 RX ADMIN — Medication SCH EACH: at 08:59

## 2016-06-06 RX ADMIN — Medication SCH EACH: at 18:28

## 2016-06-06 RX ADMIN — Medication SCH EACH: at 10:56

## 2016-06-06 RX ADMIN — Medication SCH MG: at 10:56

## 2016-06-06 RX ADMIN — Medication SCH MG: at 18:28

## 2016-06-06 RX ADMIN — LEVOTHYROXINE SODIUM SCH MCG: 75 TABLET ORAL at 06:17

## 2016-06-06 RX ADMIN — MAGNESIUM OXIDE TAB 400 MG (241.3 MG ELEMENTAL MG) SCH TAB: 400 (241.3 MG) TAB at 18:29

## 2016-06-06 RX ADMIN — Medication SCH UNIT: at 10:56

## 2016-06-07 RX ADMIN — Medication SCH EACH: at 10:15

## 2016-06-07 RX ADMIN — Medication SCH MG: at 10:16

## 2016-06-07 RX ADMIN — Medication SCH EACH: at 18:59

## 2016-06-07 RX ADMIN — Medication SCH MG: at 18:59

## 2016-06-07 RX ADMIN — MAGNESIUM OXIDE TAB 400 MG (241.3 MG ELEMENTAL MG) SCH TAB: 400 (241.3 MG) TAB at 18:59

## 2016-06-07 RX ADMIN — LEVOTHYROXINE SODIUM SCH MCG: 75 TABLET ORAL at 06:49

## 2016-06-07 RX ADMIN — Medication SCH UNIT: at 10:16

## 2016-06-08 RX ADMIN — MAGNESIUM OXIDE TAB 400 MG (241.3 MG ELEMENTAL MG) SCH TAB: 400 (241.3 MG) TAB at 19:01

## 2016-06-08 RX ADMIN — Medication SCH MG: at 19:01

## 2016-06-08 RX ADMIN — Medication SCH EACH: at 10:44

## 2016-06-08 RX ADMIN — Medication SCH MG: at 10:44

## 2016-06-08 RX ADMIN — Medication SCH MG: at 19:02

## 2016-06-08 RX ADMIN — Medication SCH UNIT: at 10:44

## 2016-06-08 RX ADMIN — Medication SCH EACH: at 19:02

## 2016-06-08 RX ADMIN — Medication SCH EACH: at 19:01

## 2016-06-08 RX ADMIN — LEVOTHYROXINE SODIUM SCH MCG: 75 TABLET ORAL at 06:26

## 2016-06-09 RX ADMIN — Medication SCH UNIT: at 09:12

## 2016-06-09 RX ADMIN — Medication SCH MG: at 09:13

## 2016-06-09 RX ADMIN — Medication SCH MG: at 18:24

## 2016-06-09 RX ADMIN — Medication SCH EACH: at 18:22

## 2016-06-09 RX ADMIN — MAGNESIUM OXIDE TAB 400 MG (241.3 MG ELEMENTAL MG) SCH TAB: 400 (241.3 MG) TAB at 18:23

## 2016-06-09 RX ADMIN — Medication SCH MG: at 18:23

## 2016-06-09 RX ADMIN — LEVOTHYROXINE SODIUM SCH MCG: 75 TABLET ORAL at 06:09

## 2016-06-09 RX ADMIN — Medication SCH EACH: at 08:00

## 2016-06-09 RX ADMIN — Medication SCH EACH: at 18:23

## 2016-06-10 RX ADMIN — Medication SCH: at 10:08

## 2016-06-10 RX ADMIN — Medication SCH EACH: at 09:35

## 2016-06-10 RX ADMIN — MAGNESIUM OXIDE TAB 400 MG (241.3 MG ELEMENTAL MG) SCH TAB: 400 (241.3 MG) TAB at 18:35

## 2016-06-10 RX ADMIN — Medication SCH UNIT: at 09:36

## 2016-06-10 RX ADMIN — Medication SCH EACH: at 18:35

## 2016-06-10 RX ADMIN — Medication SCH: at 10:09

## 2016-06-10 RX ADMIN — Medication SCH MG: at 09:36

## 2016-06-10 RX ADMIN — LEVOTHYROXINE SODIUM SCH MCG: 75 TABLET ORAL at 06:23

## 2016-06-10 RX ADMIN — Medication SCH EACH: at 18:34

## 2016-06-10 RX ADMIN — Medication SCH MG: at 18:35

## 2016-06-10 RX ADMIN — Medication SCH MG: at 18:36

## 2016-06-11 RX ADMIN — Medication SCH MG: at 09:37

## 2016-06-11 RX ADMIN — Medication SCH EACH: at 18:05

## 2016-06-11 RX ADMIN — Medication SCH EACH: at 09:43

## 2016-06-11 RX ADMIN — Medication SCH MG: at 18:05

## 2016-06-11 RX ADMIN — Medication SCH EACH: at 18:06

## 2016-06-11 RX ADMIN — Medication SCH EACH: at 09:36

## 2016-06-11 RX ADMIN — Medication SCH MG: at 18:06

## 2016-06-11 RX ADMIN — MAGNESIUM OXIDE TAB 400 MG (241.3 MG ELEMENTAL MG) SCH TAB: 400 (241.3 MG) TAB at 18:06

## 2016-06-11 RX ADMIN — Medication SCH UNIT: at 09:36

## 2016-06-12 RX ADMIN — Medication SCH MG: at 18:23

## 2016-06-12 RX ADMIN — Medication SCH MG: at 18:24

## 2016-06-12 RX ADMIN — LEVOTHYROXINE SODIUM SCH MCG: 75 TABLET ORAL at 06:19

## 2016-06-12 RX ADMIN — Medication SCH EACH: at 18:22

## 2016-06-12 RX ADMIN — Medication SCH UNIT: at 09:28

## 2016-06-12 RX ADMIN — Medication SCH EACH: at 18:23

## 2016-06-12 RX ADMIN — Medication SCH MG: at 09:29

## 2016-06-12 RX ADMIN — MAGNESIUM OXIDE TAB 400 MG (241.3 MG ELEMENTAL MG) SCH TAB: 400 (241.3 MG) TAB at 18:23

## 2016-06-12 RX ADMIN — Medication SCH EACH: at 09:28

## 2016-06-13 RX ADMIN — Medication SCH MG: at 18:29

## 2016-06-13 RX ADMIN — Medication SCH EACH: at 09:31

## 2016-06-13 RX ADMIN — Medication SCH EACH: at 18:28

## 2016-06-13 RX ADMIN — LEVOTHYROXINE SODIUM SCH MCG: 75 TABLET ORAL at 06:14

## 2016-06-13 RX ADMIN — Medication SCH UNIT: at 09:31

## 2016-06-13 RX ADMIN — Medication SCH EACH: at 09:32

## 2016-06-13 RX ADMIN — MAGNESIUM OXIDE TAB 400 MG (241.3 MG ELEMENTAL MG) SCH TAB: 400 (241.3 MG) TAB at 18:29

## 2016-06-13 RX ADMIN — Medication SCH MG: at 09:31

## 2016-06-14 RX ADMIN — Medication SCH UNIT: at 09:44

## 2016-06-14 RX ADMIN — LEVOTHYROXINE SODIUM SCH MCG: 75 TABLET ORAL at 06:44

## 2016-06-14 RX ADMIN — Medication SCH EACH: at 09:43

## 2016-06-14 RX ADMIN — MAGNESIUM OXIDE TAB 400 MG (241.3 MG ELEMENTAL MG) SCH TAB: 400 (241.3 MG) TAB at 18:07

## 2016-06-14 RX ADMIN — ACETAMINOPHEN PRN MG: 650 TABLET, FILM COATED, EXTENDED RELEASE ORAL at 14:23

## 2016-06-14 RX ADMIN — Medication SCH EACH: at 18:07

## 2016-06-14 RX ADMIN — Medication SCH MG: at 18:06

## 2016-06-14 RX ADMIN — Medication SCH MG: at 09:44

## 2016-06-14 RX ADMIN — Medication SCH EACH: at 18:05

## 2016-06-15 RX ADMIN — LEVOTHYROXINE SODIUM SCH MCG: 75 TABLET ORAL at 06:01

## 2016-06-15 RX ADMIN — Medication SCH MG: at 21:57

## 2016-06-15 RX ADMIN — MAGNESIUM OXIDE TAB 400 MG (241.3 MG ELEMENTAL MG) SCH TAB: 400 (241.3 MG) TAB at 21:57

## 2016-06-15 RX ADMIN — Medication SCH EACH: at 21:55

## 2016-06-15 RX ADMIN — Medication SCH EACH: at 10:10

## 2016-06-15 RX ADMIN — ACETAMINOPHEN PRN MG: 650 TABLET, FILM COATED, EXTENDED RELEASE ORAL at 10:11

## 2016-06-15 RX ADMIN — Medication SCH UNIT: at 10:11

## 2016-06-15 RX ADMIN — Medication SCH MG: at 10:11

## 2016-06-15 RX ADMIN — Medication SCH MG: at 21:56

## 2016-06-15 RX ADMIN — Medication SCH EACH: at 21:56

## 2016-06-16 RX ADMIN — Medication SCH MG: at 18:12

## 2016-06-16 RX ADMIN — Medication SCH UNIT: at 10:08

## 2016-06-16 RX ADMIN — Medication SCH EACH: at 10:08

## 2016-06-16 RX ADMIN — Medication PRN APPLIC: at 10:10

## 2016-06-16 RX ADMIN — Medication SCH EACH: at 10:09

## 2016-06-16 RX ADMIN — Medication SCH MG: at 10:08

## 2016-06-16 RX ADMIN — LEVOTHYROXINE SODIUM SCH MCG: 75 TABLET ORAL at 06:27

## 2016-06-16 RX ADMIN — Medication SCH MG: at 18:13

## 2016-06-16 RX ADMIN — CLOBETASOL PROPIONATE PRN APPLIC: 0.5 CREAM TOPICAL at 10:09

## 2016-06-16 RX ADMIN — Medication SCH EACH: at 18:12

## 2016-06-16 RX ADMIN — MAGNESIUM OXIDE TAB 400 MG (241.3 MG ELEMENTAL MG) SCH TAB: 400 (241.3 MG) TAB at 18:13

## 2016-06-16 RX ADMIN — ACETAMINOPHEN PRN MG: 650 TABLET, FILM COATED, EXTENDED RELEASE ORAL at 10:10

## 2016-06-17 RX ADMIN — MAGNESIUM OXIDE TAB 400 MG (241.3 MG ELEMENTAL MG) SCH TAB: 400 (241.3 MG) TAB at 18:07

## 2016-06-17 RX ADMIN — LEVOTHYROXINE SODIUM SCH MCG: 75 TABLET ORAL at 06:39

## 2016-06-17 RX ADMIN — ACETAMINOPHEN PRN MG: 650 TABLET, FILM COATED, EXTENDED RELEASE ORAL at 09:24

## 2016-06-17 RX ADMIN — Medication SCH EACH: at 18:07

## 2016-06-17 RX ADMIN — Medication SCH MG: at 18:07

## 2016-06-17 RX ADMIN — Medication SCH MG: at 09:24

## 2016-06-17 RX ADMIN — Medication SCH UNIT: at 09:24

## 2016-06-17 RX ADMIN — Medication SCH EACH: at 18:08

## 2016-06-17 RX ADMIN — Medication SCH EACH: at 09:24

## 2016-06-18 RX ADMIN — Medication SCH UNIT: at 09:34

## 2016-06-18 RX ADMIN — Medication SCH MG: at 18:23

## 2016-06-18 RX ADMIN — Medication SCH MG: at 18:22

## 2016-06-18 RX ADMIN — Medication SCH EACH: at 18:22

## 2016-06-18 RX ADMIN — ACETAMINOPHEN PRN MG: 650 TABLET, FILM COATED, EXTENDED RELEASE ORAL at 09:35

## 2016-06-18 RX ADMIN — MAGNESIUM OXIDE TAB 400 MG (241.3 MG ELEMENTAL MG) SCH TAB: 400 (241.3 MG) TAB at 18:23

## 2016-06-18 RX ADMIN — Medication SCH MG: at 09:34

## 2016-06-18 RX ADMIN — Medication SCH EACH: at 09:34

## 2016-06-18 RX ADMIN — Medication SCH EACH: at 18:23

## 2016-06-19 RX ADMIN — Medication SCH EACH: at 08:56

## 2016-06-19 RX ADMIN — Medication SCH EACH: at 18:20

## 2016-06-19 RX ADMIN — Medication SCH UNIT: at 08:57

## 2016-06-19 RX ADMIN — Medication SCH MG: at 18:20

## 2016-06-19 RX ADMIN — Medication SCH: at 09:00

## 2016-06-19 RX ADMIN — ACETAMINOPHEN PRN MG: 650 TABLET, FILM COATED, EXTENDED RELEASE ORAL at 08:57

## 2016-06-19 RX ADMIN — Medication SCH EACH: at 09:00

## 2016-06-19 RX ADMIN — MAGNESIUM OXIDE TAB 400 MG (241.3 MG ELEMENTAL MG) SCH TAB: 400 (241.3 MG) TAB at 18:20

## 2016-06-19 RX ADMIN — LEVOTHYROXINE SODIUM SCH MCG: 75 TABLET ORAL at 06:29

## 2016-06-19 RX ADMIN — Medication SCH MG: at 08:59

## 2016-06-20 RX ADMIN — MAGNESIUM OXIDE TAB 400 MG (241.3 MG ELEMENTAL MG) SCH: 400 (241.3 MG) TAB at 18:11

## 2016-06-20 RX ADMIN — MAGNESIUM OXIDE TAB 400 MG (241.3 MG ELEMENTAL MG) SCH TAB: 400 (241.3 MG) TAB at 17:54

## 2016-06-20 RX ADMIN — Medication SCH: at 18:11

## 2016-06-20 RX ADMIN — LEVOTHYROXINE SODIUM SCH MCG: 75 TABLET ORAL at 06:33

## 2016-06-20 RX ADMIN — Medication SCH UNIT: at 09:47

## 2016-06-20 RX ADMIN — Medication SCH MG: at 09:47

## 2016-06-20 RX ADMIN — Medication SCH EACH: at 17:54

## 2016-06-20 RX ADMIN — Medication SCH MG: at 17:54

## 2016-06-20 RX ADMIN — ACETAMINOPHEN PRN MG: 650 TABLET, FILM COATED, EXTENDED RELEASE ORAL at 09:48

## 2016-06-20 RX ADMIN — Medication SCH EACH: at 09:47

## 2016-06-20 RX ADMIN — Medication SCH EACH: at 17:53

## 2016-06-21 RX ADMIN — Medication SCH UNIT: at 09:50

## 2016-06-21 RX ADMIN — Medication SCH MG: at 18:08

## 2016-06-21 RX ADMIN — Medication SCH MG: at 09:49

## 2016-06-21 RX ADMIN — LEVOTHYROXINE SODIUM SCH MCG: 75 TABLET ORAL at 06:41

## 2016-06-21 RX ADMIN — MAGNESIUM OXIDE TAB 400 MG (241.3 MG ELEMENTAL MG) SCH TAB: 400 (241.3 MG) TAB at 18:08

## 2016-06-21 RX ADMIN — Medication SCH EACH: at 18:06

## 2016-06-21 RX ADMIN — Medication SCH EACH: at 09:49

## 2016-06-21 RX ADMIN — Medication SCH EACH: at 18:07

## 2016-06-21 RX ADMIN — CLOTRIMAZOLE AND BETAMETHASONE DIPROPIONATE SCH APPLIC: 10; .5 CREAM TOPICAL at 22:39

## 2016-06-21 RX ADMIN — ACETAMINOPHEN PRN MG: 650 TABLET, FILM COATED, EXTENDED RELEASE ORAL at 09:50

## 2016-06-22 RX ADMIN — ACETAMINOPHEN PRN MG: 650 TABLET, FILM COATED, EXTENDED RELEASE ORAL at 10:05

## 2016-06-22 RX ADMIN — Medication SCH MG: at 20:53

## 2016-06-22 RX ADMIN — ACETAMINOPHEN PRN MG: 650 TABLET, FILM COATED, EXTENDED RELEASE ORAL at 15:11

## 2016-06-22 RX ADMIN — Medication SCH EACH: at 20:49

## 2016-06-22 RX ADMIN — Medication SCH UNIT: at 10:04

## 2016-06-22 RX ADMIN — LEVOTHYROXINE SODIUM SCH MCG: 75 TABLET ORAL at 05:44

## 2016-06-22 RX ADMIN — Medication SCH MG: at 10:04

## 2016-06-22 RX ADMIN — Medication SCH EACH: at 10:03

## 2016-06-22 RX ADMIN — MAGNESIUM OXIDE TAB 400 MG (241.3 MG ELEMENTAL MG) SCH TAB: 400 (241.3 MG) TAB at 20:50

## 2016-06-22 RX ADMIN — CLOTRIMAZOLE AND BETAMETHASONE DIPROPIONATE SCH APPLIC: 10; .5 CREAM TOPICAL at 20:54

## 2016-06-22 RX ADMIN — Medication SCH: at 10:05

## 2016-06-22 RX ADMIN — Medication SCH EACH: at 20:51

## 2016-06-22 RX ADMIN — LEVOTHYROXINE SODIUM SCH: 75 TABLET ORAL at 06:20

## 2016-06-22 RX ADMIN — Medication SCH MG: at 20:50

## 2016-06-23 RX ADMIN — CLOTRIMAZOLE AND BETAMETHASONE DIPROPIONATE SCH APPLIC: 10; .5 CREAM TOPICAL at 23:18

## 2016-06-23 RX ADMIN — Medication SCH MG: at 18:30

## 2016-06-23 RX ADMIN — ACETAMINOPHEN PRN MG: 650 TABLET, FILM COATED, EXTENDED RELEASE ORAL at 09:49

## 2016-06-23 RX ADMIN — Medication SCH UNIT: at 09:50

## 2016-06-23 RX ADMIN — Medication SCH EACH: at 18:31

## 2016-06-23 RX ADMIN — Medication SCH MG: at 09:51

## 2016-06-23 RX ADMIN — Medication SCH EACH: at 18:30

## 2016-06-23 RX ADMIN — LEVOTHYROXINE SODIUM SCH MCG: 75 TABLET ORAL at 06:10

## 2016-06-23 RX ADMIN — MAGNESIUM OXIDE TAB 400 MG (241.3 MG ELEMENTAL MG) SCH TAB: 400 (241.3 MG) TAB at 18:31

## 2016-06-23 RX ADMIN — Medication SCH EACH: at 09:49

## 2016-06-23 RX ADMIN — Medication SCH EACH: at 10:00

## 2016-06-23 RX ADMIN — CLOBETASOL PROPIONATE PRN APPLIC: 0.5 CREAM TOPICAL at 10:01

## 2016-06-24 RX ADMIN — Medication SCH MG: at 09:56

## 2016-06-24 RX ADMIN — CLOTRIMAZOLE AND BETAMETHASONE DIPROPIONATE SCH: 10; .5 CREAM TOPICAL at 19:01

## 2016-06-24 RX ADMIN — Medication SCH MG: at 18:03

## 2016-06-24 RX ADMIN — Medication SCH UNIT: at 09:56

## 2016-06-24 RX ADMIN — Medication SCH EACH: at 18:02

## 2016-06-24 RX ADMIN — Medication SCH EACH: at 09:56

## 2016-06-24 RX ADMIN — Medication SCH EACH: at 18:03

## 2016-06-24 RX ADMIN — LEVOTHYROXINE SODIUM SCH MCG: 75 TABLET ORAL at 05:54

## 2016-06-24 RX ADMIN — LEVOTHYROXINE SODIUM SCH: 75 TABLET ORAL at 10:00

## 2016-06-24 RX ADMIN — ACETAMINOPHEN PRN MG: 650 TABLET, FILM COATED, EXTENDED RELEASE ORAL at 09:57

## 2016-06-24 RX ADMIN — CLOTRIMAZOLE AND BETAMETHASONE DIPROPIONATE SCH APPLIC: 10; .5 CREAM TOPICAL at 18:04

## 2016-06-24 RX ADMIN — MAGNESIUM OXIDE TAB 400 MG (241.3 MG ELEMENTAL MG) SCH TAB: 400 (241.3 MG) TAB at 18:03

## 2016-06-24 RX ADMIN — CLOBETASOL PROPIONATE PRN APPLIC: 0.5 CREAM TOPICAL at 09:58

## 2016-06-25 RX ADMIN — Medication PRN APPLIC: at 09:23

## 2016-06-25 RX ADMIN — CLOTRIMAZOLE AND BETAMETHASONE DIPROPIONATE SCH APPLIC: 10; .5 CREAM TOPICAL at 23:14

## 2016-06-25 RX ADMIN — Medication SCH EACH: at 09:23

## 2016-06-25 RX ADMIN — CLOBETASOL PROPIONATE PRN APPLIC: 0.5 CREAM TOPICAL at 09:23

## 2016-06-25 RX ADMIN — MAGNESIUM OXIDE TAB 400 MG (241.3 MG ELEMENTAL MG) SCH: 400 (241.3 MG) TAB at 18:02

## 2016-06-25 RX ADMIN — ACETAMINOPHEN PRN MG: 650 TABLET, FILM COATED, EXTENDED RELEASE ORAL at 09:21

## 2016-06-25 RX ADMIN — Medication SCH MG: at 09:22

## 2016-06-25 RX ADMIN — Medication SCH: at 18:02

## 2016-06-25 RX ADMIN — Medication SCH EACH: at 09:20

## 2016-06-25 RX ADMIN — Medication SCH EACH: at 17:54

## 2016-06-25 RX ADMIN — Medication SCH EACH: at 17:53

## 2016-06-25 RX ADMIN — Medication SCH MG: at 17:53

## 2016-06-25 RX ADMIN — Medication SCH UNIT: at 09:22

## 2016-06-25 RX ADMIN — MAGNESIUM OXIDE TAB 400 MG (241.3 MG ELEMENTAL MG) SCH TAB: 400 (241.3 MG) TAB at 17:53

## 2016-06-26 RX ADMIN — MAGNESIUM OXIDE TAB 400 MG (241.3 MG ELEMENTAL MG) SCH TAB: 400 (241.3 MG) TAB at 18:16

## 2016-06-26 RX ADMIN — Medication SCH EACH: at 10:24

## 2016-06-26 RX ADMIN — Medication SCH MG: at 18:16

## 2016-06-26 RX ADMIN — CLOTRIMAZOLE AND BETAMETHASONE DIPROPIONATE SCH APPLIC: 10; .5 CREAM TOPICAL at 22:45

## 2016-06-26 RX ADMIN — Medication SCH UNIT: at 10:24

## 2016-06-26 RX ADMIN — ACETAMINOPHEN PRN MG: 650 TABLET, FILM COATED, EXTENDED RELEASE ORAL at 10:22

## 2016-06-26 RX ADMIN — Medication SCH EACH: at 18:15

## 2016-06-26 RX ADMIN — LEVOTHYROXINE SODIUM SCH MCG: 75 TABLET ORAL at 06:11

## 2016-06-26 RX ADMIN — Medication SCH MG: at 10:23

## 2016-06-26 RX ADMIN — Medication SCH MG: at 18:15

## 2016-06-27 RX ADMIN — ACETAMINOPHEN PRN MG: 650 TABLET, FILM COATED, EXTENDED RELEASE ORAL at 09:40

## 2016-06-27 RX ADMIN — MAGNESIUM OXIDE TAB 400 MG (241.3 MG ELEMENTAL MG) SCH TAB: 400 (241.3 MG) TAB at 18:12

## 2016-06-27 RX ADMIN — Medication SCH MG: at 18:12

## 2016-06-27 RX ADMIN — Medication SCH EACH: at 09:40

## 2016-06-27 RX ADMIN — CLOTRIMAZOLE AND BETAMETHASONE DIPROPIONATE SCH APPLIC: 10; .5 CREAM TOPICAL at 23:11

## 2016-06-27 RX ADMIN — Medication SCH EACH: at 18:12

## 2016-06-27 RX ADMIN — LEVOTHYROXINE SODIUM SCH MCG: 75 TABLET ORAL at 06:06

## 2016-06-27 RX ADMIN — Medication SCH EACH: at 18:11

## 2016-06-27 RX ADMIN — Medication SCH MG: at 09:40

## 2016-06-27 RX ADMIN — Medication SCH UNIT: at 09:39

## 2016-06-28 RX ADMIN — Medication SCH MG: at 18:54

## 2016-06-28 RX ADMIN — CLOTRIMAZOLE AND BETAMETHASONE DIPROPIONATE SCH APPLIC: 10; .5 CREAM TOPICAL at 22:58

## 2016-06-28 RX ADMIN — Medication SCH MG: at 18:55

## 2016-06-28 RX ADMIN — Medication SCH EACH: at 08:57

## 2016-06-28 RX ADMIN — ACETAMINOPHEN PRN MG: 650 TABLET, FILM COATED, EXTENDED RELEASE ORAL at 17:00

## 2016-06-28 RX ADMIN — Medication SCH: at 08:59

## 2016-06-28 RX ADMIN — MAGNESIUM OXIDE TAB 400 MG (241.3 MG ELEMENTAL MG) SCH TAB: 400 (241.3 MG) TAB at 18:55

## 2016-06-28 RX ADMIN — Medication SCH MG: at 08:57

## 2016-06-28 RX ADMIN — Medication SCH EACH: at 08:58

## 2016-06-28 RX ADMIN — Medication SCH EACH: at 18:54

## 2016-06-28 RX ADMIN — Medication SCH UNIT: at 08:57

## 2016-06-28 RX ADMIN — ACETAMINOPHEN PRN MG: 650 TABLET, FILM COATED, EXTENDED RELEASE ORAL at 08:58

## 2016-06-28 RX ADMIN — LEVOTHYROXINE SODIUM SCH MCG: 75 TABLET ORAL at 06:33

## 2016-06-29 RX ADMIN — Medication SCH EACH: at 10:27

## 2016-06-29 RX ADMIN — ACETAMINOPHEN PRN MG: 650 TABLET, FILM COATED, EXTENDED RELEASE ORAL at 10:26

## 2016-06-29 RX ADMIN — Medication SCH MG: at 10:27

## 2016-06-29 RX ADMIN — CLOTRIMAZOLE AND BETAMETHASONE DIPROPIONATE SCH APPLIC: 10; .5 CREAM TOPICAL at 23:20

## 2016-06-29 RX ADMIN — Medication SCH MG: at 21:30

## 2016-06-29 RX ADMIN — LEVOTHYROXINE SODIUM SCH MCG: 75 TABLET ORAL at 06:16

## 2016-06-29 RX ADMIN — Medication SCH EACH: at 21:29

## 2016-06-29 RX ADMIN — Medication SCH MG: at 21:31

## 2016-06-29 RX ADMIN — Medication SCH EACH: at 21:30

## 2016-06-29 RX ADMIN — Medication SCH UNIT: at 10:27

## 2016-06-29 RX ADMIN — MAGNESIUM OXIDE TAB 400 MG (241.3 MG ELEMENTAL MG) SCH TAB: 400 (241.3 MG) TAB at 21:31

## 2016-06-30 RX ADMIN — Medication SCH MG: at 20:39

## 2016-06-30 RX ADMIN — Medication SCH UNIT: at 10:09

## 2016-06-30 RX ADMIN — Medication SCH EACH: at 10:08

## 2016-06-30 RX ADMIN — CLOTRIMAZOLE AND BETAMETHASONE DIPROPIONATE SCH APPLIC: 10; .5 CREAM TOPICAL at 20:40

## 2016-06-30 RX ADMIN — Medication SCH MG: at 10:09

## 2016-06-30 RX ADMIN — MAGNESIUM OXIDE TAB 400 MG (241.3 MG ELEMENTAL MG) SCH TAB: 400 (241.3 MG) TAB at 20:39

## 2016-06-30 RX ADMIN — Medication SCH EACH: at 20:38

## 2016-06-30 RX ADMIN — LEVOTHYROXINE SODIUM SCH MCG: 75 TABLET ORAL at 06:10

## 2016-06-30 RX ADMIN — ACETAMINOPHEN PRN MG: 650 TABLET, FILM COATED, EXTENDED RELEASE ORAL at 10:08

## 2016-07-01 RX ADMIN — ACETAMINOPHEN PRN MG: 650 TABLET, FILM COATED, EXTENDED RELEASE ORAL at 09:48

## 2016-07-01 RX ADMIN — Medication SCH EACH: at 18:04

## 2016-07-01 RX ADMIN — Medication SCH EACH: at 18:03

## 2016-07-01 RX ADMIN — Medication SCH MG: at 09:48

## 2016-07-01 RX ADMIN — Medication SCH EACH: at 09:47

## 2016-07-01 RX ADMIN — MAGNESIUM OXIDE TAB 400 MG (241.3 MG ELEMENTAL MG) SCH TAB: 400 (241.3 MG) TAB at 18:04

## 2016-07-01 RX ADMIN — Medication SCH EACH: at 09:51

## 2016-07-01 RX ADMIN — CLOTRIMAZOLE AND BETAMETHASONE DIPROPIONATE SCH APPLIC: 10; .5 CREAM TOPICAL at 23:13

## 2016-07-01 RX ADMIN — Medication SCH MG: at 18:04

## 2016-07-01 RX ADMIN — Medication SCH EACH: at 06:29

## 2016-07-01 RX ADMIN — Medication SCH UNIT: at 09:48

## 2016-07-02 RX ADMIN — Medication SCH EACH: at 18:40

## 2016-07-02 RX ADMIN — Medication SCH MG: at 09:37

## 2016-07-02 RX ADMIN — Medication SCH EACH: at 09:38

## 2016-07-02 RX ADMIN — Medication SCH EACH: at 18:41

## 2016-07-02 RX ADMIN — Medication SCH UNIT: at 09:36

## 2016-07-02 RX ADMIN — Medication SCH MG: at 18:39

## 2016-07-02 RX ADMIN — Medication SCH MG: at 18:41

## 2016-07-02 RX ADMIN — MAGNESIUM OXIDE TAB 400 MG (241.3 MG ELEMENTAL MG) SCH TAB: 400 (241.3 MG) TAB at 18:40

## 2016-07-02 RX ADMIN — ACETAMINOPHEN PRN MG: 650 TABLET, FILM COATED, EXTENDED RELEASE ORAL at 09:38

## 2016-07-02 RX ADMIN — CLOTRIMAZOLE AND BETAMETHASONE DIPROPIONATE SCH APPLIC: 10; .5 CREAM TOPICAL at 22:57

## 2016-07-02 RX ADMIN — Medication PRN APPLIC: at 12:22

## 2016-07-03 RX ADMIN — Medication SCH MG: at 09:44

## 2016-07-03 RX ADMIN — Medication SCH UNIT: at 09:44

## 2016-07-03 RX ADMIN — Medication SCH EACH: at 18:22

## 2016-07-03 RX ADMIN — Medication SCH EACH: at 09:47

## 2016-07-03 RX ADMIN — MAGNESIUM OXIDE TAB 400 MG (241.3 MG ELEMENTAL MG) SCH TAB: 400 (241.3 MG) TAB at 18:19

## 2016-07-03 RX ADMIN — Medication SCH MG: at 18:20

## 2016-07-03 RX ADMIN — Medication SCH EACH: at 06:32

## 2016-07-03 RX ADMIN — Medication PRN APPLIC: at 09:48

## 2016-07-03 RX ADMIN — CLOTRIMAZOLE AND BETAMETHASONE DIPROPIONATE SCH APPLIC: 10; .5 CREAM TOPICAL at 22:50

## 2016-07-03 RX ADMIN — Medication SCH EACH: at 18:21

## 2016-07-03 RX ADMIN — Medication SCH MG: at 18:18

## 2016-07-03 RX ADMIN — ACETAMINOPHEN PRN MG: 650 TABLET, FILM COATED, EXTENDED RELEASE ORAL at 09:57

## 2016-07-04 RX ADMIN — Medication SCH MG: at 18:39

## 2016-07-04 RX ADMIN — Medication SCH EACH: at 09:41

## 2016-07-04 RX ADMIN — Medication SCH EACH: at 18:41

## 2016-07-04 RX ADMIN — CLOTRIMAZOLE AND BETAMETHASONE DIPROPIONATE SCH APPLIC: 10; .5 CREAM TOPICAL at 23:16

## 2016-07-04 RX ADMIN — ACETAMINOPHEN PRN MG: 650 TABLET, FILM COATED, EXTENDED RELEASE ORAL at 09:41

## 2016-07-04 RX ADMIN — Medication SCH MG: at 09:40

## 2016-07-04 RX ADMIN — Medication SCH EACH: at 18:40

## 2016-07-04 RX ADMIN — Medication SCH MG: at 18:40

## 2016-07-04 RX ADMIN — Medication SCH EACH: at 06:00

## 2016-07-04 RX ADMIN — MAGNESIUM OXIDE TAB 400 MG (241.3 MG ELEMENTAL MG) SCH TAB: 400 (241.3 MG) TAB at 18:40

## 2016-07-04 RX ADMIN — Medication SCH EACH: at 09:44

## 2016-07-04 RX ADMIN — Medication SCH UNIT: at 09:39

## 2016-07-05 RX ADMIN — Medication SCH EACH: at 10:58

## 2016-07-05 RX ADMIN — ACETAMINOPHEN PRN MG: 650 TABLET, FILM COATED, EXTENDED RELEASE ORAL at 11:00

## 2016-07-05 RX ADMIN — Medication SCH EACH: at 06:15

## 2016-07-05 RX ADMIN — Medication SCH EACH: at 18:30

## 2016-07-05 RX ADMIN — Medication SCH EACH: at 18:31

## 2016-07-05 RX ADMIN — Medication SCH UNIT: at 10:58

## 2016-07-05 RX ADMIN — Medication SCH MG: at 18:31

## 2016-07-05 RX ADMIN — Medication SCH MG: at 10:58

## 2016-07-05 RX ADMIN — MAGNESIUM OXIDE TAB 400 MG (241.3 MG ELEMENTAL MG) SCH TAB: 400 (241.3 MG) TAB at 18:31

## 2016-07-05 NOTE — PN
Progress Note for JENIFER SHEETS  Date:  07/05/2016  Room #:  VM.207

 

SUBJECTIVE:  This is an 87-year-old seen for her monthly follow up.  Her main

concern is vaginal irritation.  She states it is pretty good today, but got

worse over the weekend.  She was to see GYN last month.  Her UA and wet prep

were both negative.  They switched her clobetasol to Lotrisone.  Their concern

was that she has some stress urinary incontinence, which she admits at times

especially if somebody is trying to move her.  She will be incontinent.  She

does not always need to wear a pad and she is mostly dry, but does occasionally

leak.  Otherwise, she is feeling fine.  No chest pain.  No cough.  No shortness

of breath.

 

OBJECTIVE:  Vital signs:  Temperature is 97.9, pulse 68, blood pressure 112/59,

respiratory rate 20, O2 94% on room air.

General:  She is in no acute distress.

Heart:  Irregularly irregular.

Lungs:  Sounds are clear to auscultation bilaterally without crackles or

wheezes.

Extremities:  Warm and dry.  No edema.

Mental status:  Alert and orientated x3.

 

ASSESSMENT AND PLAN:

1. Atrophic vaginitis and vaginal irritation with stress urinary incontinence

    leading to chronic dermatitis and vulvitis.  At this point, the patient

    will continue her Premarin cream twice weekly and Lotrisone.  She will be

    seeing PT for pelvic floor strengthening later this month.

2. Atrial fibrillation with previous transient ischemic attack, on Pradaxa.

3. Mild thrombocytopenia.

4. Chronic leg weakness.

5. Severe osteoarthritis especially in the knees.

6. Hypothyroidism.

7. Essential hypertension, controlled.

 

PLAN:  At this point, the patient will continue swing bed cares.  No lab work to

do.  We will recertify her in 1 month.  We will continue with pads and trying to

keep her dry and pelvic floor PT with Lotrisone and Premarin.

 

 

MKA:  07/05/2016 21:15:42  MODL:  07/05/2016 21:30:35

Job #:  [id number]

## 2016-07-06 RX ADMIN — Medication SCH EACH: at 09:27

## 2016-07-06 RX ADMIN — ACETAMINOPHEN PRN MG: 650 TABLET, FILM COATED, EXTENDED RELEASE ORAL at 09:28

## 2016-07-06 RX ADMIN — CLOTRIMAZOLE AND BETAMETHASONE DIPROPIONATE SCH GM: 10; .5 CREAM TOPICAL at 22:22

## 2016-07-06 RX ADMIN — MAGNESIUM OXIDE TAB 400 MG (241.3 MG ELEMENTAL MG) SCH TAB: 400 (241.3 MG) TAB at 18:08

## 2016-07-06 RX ADMIN — Medication SCH MG: at 09:26

## 2016-07-06 RX ADMIN — Medication SCH UNIT: at 09:26

## 2016-07-06 RX ADMIN — Medication SCH EACH: at 18:10

## 2016-07-06 RX ADMIN — Medication SCH MG: at 18:09

## 2016-07-06 RX ADMIN — Medication SCH MG: at 18:08

## 2016-07-06 RX ADMIN — Medication SCH EACH: at 06:01

## 2016-07-07 RX ADMIN — Medication PRN APPLIC: at 09:57

## 2016-07-07 RX ADMIN — Medication SCH EACH: at 09:54

## 2016-07-07 RX ADMIN — Medication SCH EACH: at 06:43

## 2016-07-07 RX ADMIN — Medication SCH MG: at 18:24

## 2016-07-07 RX ADMIN — Medication SCH MG: at 09:56

## 2016-07-07 RX ADMIN — ACETAMINOPHEN PRN MG: 650 TABLET, FILM COATED, EXTENDED RELEASE ORAL at 09:55

## 2016-07-07 RX ADMIN — Medication SCH EACH: at 18:25

## 2016-07-07 RX ADMIN — Medication SCH EACH: at 18:24

## 2016-07-07 RX ADMIN — MAGNESIUM OXIDE TAB 400 MG (241.3 MG ELEMENTAL MG) SCH TAB: 400 (241.3 MG) TAB at 18:24

## 2016-07-07 RX ADMIN — Medication SCH EACH: at 09:57

## 2016-07-07 RX ADMIN — Medication SCH UNIT: at 09:56

## 2016-07-08 RX ADMIN — CLOTRIMAZOLE AND BETAMETHASONE DIPROPIONATE SCH GM: 10; .5 CREAM TOPICAL at 23:11

## 2016-07-08 RX ADMIN — Medication SCH MG: at 09:58

## 2016-07-08 RX ADMIN — Medication SCH MG: at 18:24

## 2016-07-08 RX ADMIN — ACETAMINOPHEN PRN MG: 650 TABLET, FILM COATED, EXTENDED RELEASE ORAL at 09:57

## 2016-07-08 RX ADMIN — Medication SCH UNIT: at 09:58

## 2016-07-08 RX ADMIN — Medication SCH EACH: at 09:58

## 2016-07-08 RX ADMIN — MAGNESIUM OXIDE TAB 400 MG (241.3 MG ELEMENTAL MG) SCH TAB: 400 (241.3 MG) TAB at 18:23

## 2016-07-08 RX ADMIN — Medication SCH EACH: at 18:22

## 2016-07-08 RX ADMIN — Medication SCH EACH: at 18:23

## 2016-07-08 RX ADMIN — Medication SCH EACH: at 09:57

## 2016-07-08 RX ADMIN — Medication SCH MG: at 18:23

## 2016-07-09 RX ADMIN — Medication SCH UNIT: at 09:26

## 2016-07-09 RX ADMIN — Medication SCH EACH: at 18:27

## 2016-07-09 RX ADMIN — Medication SCH EACH: at 09:25

## 2016-07-09 RX ADMIN — Medication SCH EACH: at 18:25

## 2016-07-09 RX ADMIN — MAGNESIUM OXIDE TAB 400 MG (241.3 MG ELEMENTAL MG) SCH TAB: 400 (241.3 MG) TAB at 18:27

## 2016-07-09 RX ADMIN — ACETAMINOPHEN PRN MG: 650 TABLET, FILM COATED, EXTENDED RELEASE ORAL at 09:27

## 2016-07-09 RX ADMIN — Medication SCH MG: at 18:27

## 2016-07-09 RX ADMIN — Medication SCH MG: at 09:26

## 2016-07-10 RX ADMIN — Medication SCH EACH: at 09:27

## 2016-07-10 RX ADMIN — ACETAMINOPHEN PRN MG: 650 TABLET, FILM COATED, EXTENDED RELEASE ORAL at 09:29

## 2016-07-10 RX ADMIN — Medication SCH EACH: at 06:42

## 2016-07-10 RX ADMIN — Medication SCH MG: at 18:12

## 2016-07-10 RX ADMIN — Medication SCH UNIT: at 09:22

## 2016-07-10 RX ADMIN — MAGNESIUM OXIDE TAB 400 MG (241.3 MG ELEMENTAL MG) SCH TAB: 400 (241.3 MG) TAB at 18:12

## 2016-07-10 RX ADMIN — Medication SCH EACH: at 18:13

## 2016-07-10 RX ADMIN — Medication SCH EACH: at 09:25

## 2016-07-10 RX ADMIN — Medication SCH MG: at 09:23

## 2016-07-11 RX ADMIN — MAGNESIUM OXIDE TAB 400 MG (241.3 MG ELEMENTAL MG) SCH TAB: 400 (241.3 MG) TAB at 18:07

## 2016-07-11 RX ADMIN — Medication SCH MG: at 09:16

## 2016-07-11 RX ADMIN — Medication SCH EACH: at 09:16

## 2016-07-11 RX ADMIN — Medication SCH MG: at 18:07

## 2016-07-11 RX ADMIN — Medication SCH EACH: at 06:07

## 2016-07-11 RX ADMIN — Medication SCH UNIT: at 09:15

## 2016-07-11 RX ADMIN — ACETAMINOPHEN PRN MG: 650 TABLET, FILM COATED, EXTENDED RELEASE ORAL at 09:16

## 2016-07-11 RX ADMIN — Medication SCH MG: at 18:08

## 2016-07-11 RX ADMIN — CLOTRIMAZOLE AND BETAMETHASONE DIPROPIONATE SCH GM: 10; .5 CREAM TOPICAL at 22:42

## 2016-07-11 RX ADMIN — Medication SCH EACH: at 18:06

## 2016-07-12 RX ADMIN — MAGNESIUM OXIDE TAB 400 MG (241.3 MG ELEMENTAL MG) SCH TAB: 400 (241.3 MG) TAB at 18:38

## 2016-07-12 RX ADMIN — Medication SCH EACH: at 18:38

## 2016-07-12 RX ADMIN — Medication SCH MG: at 18:38

## 2016-07-12 RX ADMIN — Medication SCH: at 12:17

## 2016-07-13 RX ADMIN — Medication SCH EACH: at 18:15

## 2016-07-13 RX ADMIN — Medication SCH EACH: at 06:13

## 2016-07-13 RX ADMIN — ACETAMINOPHEN PRN MG: 650 TABLET, FILM COATED, EXTENDED RELEASE ORAL at 09:43

## 2016-07-13 RX ADMIN — MAGNESIUM OXIDE TAB 400 MG (241.3 MG ELEMENTAL MG) SCH TAB: 400 (241.3 MG) TAB at 18:16

## 2016-07-13 RX ADMIN — Medication SCH EACH: at 09:43

## 2016-07-13 RX ADMIN — Medication SCH EACH: at 09:46

## 2016-07-13 RX ADMIN — CLOTRIMAZOLE AND BETAMETHASONE DIPROPIONATE SCH GM: 10; .5 CREAM TOPICAL at 23:35

## 2016-07-13 RX ADMIN — Medication SCH UNIT: at 09:45

## 2016-07-13 RX ADMIN — Medication SCH MG: at 18:15

## 2016-07-13 RX ADMIN — Medication SCH EACH: at 18:16

## 2016-07-13 RX ADMIN — ACETAMINOPHEN PRN MG: 650 TABLET, FILM COATED, EXTENDED RELEASE ORAL at 14:48

## 2016-07-13 RX ADMIN — Medication SCH MG: at 09:45

## 2016-07-13 RX ADMIN — Medication SCH MG: at 18:16

## 2016-07-14 RX ADMIN — MAGNESIUM OXIDE TAB 400 MG (241.3 MG ELEMENTAL MG) SCH: 400 (241.3 MG) TAB at 18:02

## 2016-07-14 RX ADMIN — Medication SCH EACH: at 09:25

## 2016-07-14 RX ADMIN — Medication SCH MG: at 09:26

## 2016-07-14 RX ADMIN — MAGNESIUM OXIDE TAB 400 MG (241.3 MG ELEMENTAL MG) SCH TAB: 400 (241.3 MG) TAB at 17:21

## 2016-07-14 RX ADMIN — Medication SCH UNIT: at 09:26

## 2016-07-14 RX ADMIN — Medication SCH MG: at 17:20

## 2016-07-14 RX ADMIN — ACETAMINOPHEN PRN MG: 650 TABLET, FILM COATED, EXTENDED RELEASE ORAL at 09:25

## 2016-07-14 RX ADMIN — Medication SCH: at 18:02

## 2016-07-14 RX ADMIN — Medication SCH MG: at 17:21

## 2016-07-14 RX ADMIN — Medication SCH: at 06:10

## 2016-07-14 RX ADMIN — Medication SCH EACH: at 17:22

## 2016-07-14 RX ADMIN — Medication SCH EACH: at 05:37

## 2016-07-14 RX ADMIN — ACETAMINOPHEN PRN MG: 650 TABLET, FILM COATED, EXTENDED RELEASE ORAL at 17:19

## 2016-07-15 RX ADMIN — ACETAMINOPHEN PRN MG: 650 TABLET, FILM COATED, EXTENDED RELEASE ORAL at 09:41

## 2016-07-15 RX ADMIN — Medication SCH EACH: at 09:40

## 2016-07-15 RX ADMIN — Medication SCH UNIT: at 09:40

## 2016-07-15 RX ADMIN — Medication SCH MG: at 09:40

## 2016-07-15 RX ADMIN — Medication SCH MG: at 18:31

## 2016-07-15 RX ADMIN — Medication SCH EACH: at 06:20

## 2016-07-15 RX ADMIN — Medication SCH EACH: at 18:31

## 2016-07-15 RX ADMIN — MAGNESIUM OXIDE TAB 400 MG (241.3 MG ELEMENTAL MG) SCH TAB: 400 (241.3 MG) TAB at 18:31

## 2016-07-15 RX ADMIN — CLOTRIMAZOLE AND BETAMETHASONE DIPROPIONATE SCH GM: 10; .5 CREAM TOPICAL at 19:53

## 2016-07-16 RX ADMIN — Medication SCH EACH: at 08:34

## 2016-07-16 RX ADMIN — MAGNESIUM OXIDE TAB 400 MG (241.3 MG ELEMENTAL MG) SCH TAB: 400 (241.3 MG) TAB at 19:49

## 2016-07-16 RX ADMIN — Medication SCH EACH: at 08:33

## 2016-07-16 RX ADMIN — Medication SCH UNIT: at 08:33

## 2016-07-16 RX ADMIN — Medication SCH EACH: at 08:35

## 2016-07-16 RX ADMIN — ACETAMINOPHEN PRN MG: 650 TABLET, FILM COATED, EXTENDED RELEASE ORAL at 08:33

## 2016-07-16 RX ADMIN — Medication SCH: at 09:13

## 2016-07-16 RX ADMIN — Medication SCH MG: at 08:51

## 2016-07-16 RX ADMIN — Medication SCH EACH: at 19:49

## 2016-07-16 RX ADMIN — Medication SCH EACH: at 19:48

## 2016-07-16 RX ADMIN — Medication SCH MG: at 19:49

## 2016-07-17 RX ADMIN — Medication SCH MG: at 18:11

## 2016-07-17 RX ADMIN — MAGNESIUM OXIDE TAB 400 MG (241.3 MG ELEMENTAL MG) SCH TAB: 400 (241.3 MG) TAB at 18:11

## 2016-07-17 RX ADMIN — Medication SCH EACH: at 06:27

## 2016-07-17 RX ADMIN — Medication SCH UNIT: at 10:30

## 2016-07-17 RX ADMIN — Medication SCH MG: at 10:30

## 2016-07-17 RX ADMIN — ACETAMINOPHEN PRN MG: 650 TABLET, FILM COATED, EXTENDED RELEASE ORAL at 10:30

## 2016-07-17 RX ADMIN — Medication SCH EACH: at 18:11

## 2016-07-17 RX ADMIN — Medication SCH EACH: at 10:30

## 2016-07-18 RX ADMIN — Medication SCH MG: at 09:32

## 2016-07-18 RX ADMIN — Medication SCH EACH: at 18:10

## 2016-07-18 RX ADMIN — CLOBETASOL PROPIONATE PRN APPLIC: 0.5 CREAM TOPICAL at 00:41

## 2016-07-18 RX ADMIN — CLOTRIMAZOLE AND BETAMETHASONE DIPROPIONATE SCH GM: 10; .5 CREAM TOPICAL at 22:44

## 2016-07-18 RX ADMIN — Medication SCH UNIT: at 09:32

## 2016-07-18 RX ADMIN — Medication SCH EACH: at 06:02

## 2016-07-18 RX ADMIN — Medication SCH MG: at 18:10

## 2016-07-18 RX ADMIN — MAGNESIUM OXIDE TAB 400 MG (241.3 MG ELEMENTAL MG) SCH TAB: 400 (241.3 MG) TAB at 18:10

## 2016-07-18 RX ADMIN — ACETAMINOPHEN PRN MG: 650 TABLET, FILM COATED, EXTENDED RELEASE ORAL at 09:32

## 2016-07-18 RX ADMIN — Medication SCH EACH: at 09:32

## 2016-07-19 RX ADMIN — Medication SCH MG: at 10:16

## 2016-07-19 RX ADMIN — Medication SCH EACH: at 18:36

## 2016-07-19 RX ADMIN — Medication SCH EACH: at 10:16

## 2016-07-19 RX ADMIN — ACETAMINOPHEN PRN MG: 650 TABLET, FILM COATED, EXTENDED RELEASE ORAL at 10:16

## 2016-07-19 RX ADMIN — Medication SCH: at 11:07

## 2016-07-19 RX ADMIN — Medication SCH UNIT: at 10:16

## 2016-07-19 RX ADMIN — Medication SCH EACH: at 06:14

## 2016-07-19 RX ADMIN — MAGNESIUM OXIDE TAB 400 MG (241.3 MG ELEMENTAL MG) SCH TAB: 400 (241.3 MG) TAB at 18:36

## 2016-07-19 RX ADMIN — Medication SCH MG: at 18:36

## 2016-07-20 RX ADMIN — Medication SCH EACH: at 09:43

## 2016-07-20 RX ADMIN — ACETAMINOPHEN PRN MG: 650 TABLET, FILM COATED, EXTENDED RELEASE ORAL at 09:38

## 2016-07-20 RX ADMIN — Medication SCH UNIT: at 09:39

## 2016-07-20 RX ADMIN — Medication SCH EACH: at 06:05

## 2016-07-20 RX ADMIN — Medication SCH EACH: at 18:15

## 2016-07-20 RX ADMIN — Medication SCH MG: at 09:39

## 2016-07-20 RX ADMIN — CLOTRIMAZOLE AND BETAMETHASONE DIPROPIONATE SCH GM: 10; .5 CREAM TOPICAL at 22:56

## 2016-07-20 RX ADMIN — Medication SCH EACH: at 18:16

## 2016-07-20 RX ADMIN — Medication SCH MG: at 18:16

## 2016-07-20 RX ADMIN — MAGNESIUM OXIDE TAB 400 MG (241.3 MG ELEMENTAL MG) SCH TAB: 400 (241.3 MG) TAB at 18:16

## 2016-07-20 RX ADMIN — Medication SCH EACH: at 09:38

## 2016-07-20 RX ADMIN — Medication SCH MG: at 18:15

## 2016-07-21 RX ADMIN — MAGNESIUM OXIDE TAB 400 MG (241.3 MG ELEMENTAL MG) SCH TAB: 400 (241.3 MG) TAB at 19:32

## 2016-07-21 RX ADMIN — Medication SCH EACH: at 19:32

## 2016-07-21 RX ADMIN — Medication SCH EACH: at 19:33

## 2016-07-21 RX ADMIN — Medication SCH EACH: at 06:05

## 2016-07-21 RX ADMIN — ACETAMINOPHEN PRN MG: 650 TABLET, FILM COATED, EXTENDED RELEASE ORAL at 09:53

## 2016-07-21 RX ADMIN — Medication SCH EACH: at 09:55

## 2016-07-21 RX ADMIN — Medication SCH MG: at 09:56

## 2016-07-21 RX ADMIN — Medication SCH MG: at 19:32

## 2016-07-21 RX ADMIN — Medication SCH UNIT: at 09:56

## 2016-07-22 RX ADMIN — ACETAMINOPHEN PRN MG: 650 TABLET, FILM COATED, EXTENDED RELEASE ORAL at 10:38

## 2016-07-22 RX ADMIN — Medication SCH MG: at 10:37

## 2016-07-22 RX ADMIN — Medication SCH EACH: at 06:49

## 2016-07-22 RX ADMIN — CLOTRIMAZOLE AND BETAMETHASONE DIPROPIONATE SCH GM: 10; .5 CREAM TOPICAL at 23:05

## 2016-07-22 RX ADMIN — MAGNESIUM OXIDE TAB 400 MG (241.3 MG ELEMENTAL MG) SCH TAB: 400 (241.3 MG) TAB at 18:15

## 2016-07-22 RX ADMIN — Medication SCH MG: at 18:15

## 2016-07-22 RX ADMIN — Medication SCH MG: at 18:16

## 2016-07-22 RX ADMIN — Medication SCH EACH: at 23:03

## 2016-07-22 RX ADMIN — Medication SCH UNIT: at 10:37

## 2016-07-22 RX ADMIN — Medication SCH EACH: at 10:36

## 2016-07-22 RX ADMIN — Medication SCH EACH: at 18:17

## 2016-07-23 RX ADMIN — Medication SCH MG: at 10:53

## 2016-07-23 RX ADMIN — ACETAMINOPHEN PRN MG: 650 TABLET, FILM COATED, EXTENDED RELEASE ORAL at 10:53

## 2016-07-23 RX ADMIN — Medication SCH MG: at 18:56

## 2016-07-23 RX ADMIN — Medication SCH EACH: at 18:57

## 2016-07-23 RX ADMIN — Medication SCH MG: at 18:55

## 2016-07-23 RX ADMIN — Medication SCH EACH: at 10:53

## 2016-07-23 RX ADMIN — Medication SCH EACH: at 18:55

## 2016-07-23 RX ADMIN — MAGNESIUM OXIDE TAB 400 MG (241.3 MG ELEMENTAL MG) SCH TAB: 400 (241.3 MG) TAB at 18:56

## 2016-07-23 RX ADMIN — Medication SCH UNIT: at 10:53

## 2016-07-24 RX ADMIN — Medication SCH EACH: at 18:13

## 2016-07-24 RX ADMIN — ACETAMINOPHEN PRN MG: 650 TABLET, FILM COATED, EXTENDED RELEASE ORAL at 07:33

## 2016-07-24 RX ADMIN — Medication SCH EACH: at 18:12

## 2016-07-24 RX ADMIN — MAGNESIUM OXIDE TAB 400 MG (241.3 MG ELEMENTAL MG) SCH TAB: 400 (241.3 MG) TAB at 18:11

## 2016-07-24 RX ADMIN — Medication SCH UNIT: at 09:21

## 2016-07-24 RX ADMIN — Medication SCH EACH: at 07:32

## 2016-07-24 RX ADMIN — Medication SCH MG: at 18:11

## 2016-07-24 RX ADMIN — Medication SCH MG: at 18:12

## 2016-07-24 RX ADMIN — Medication SCH MG: at 09:21

## 2016-07-24 RX ADMIN — Medication SCH EACH: at 09:20

## 2016-07-25 RX ADMIN — Medication SCH UNIT: at 09:17

## 2016-07-25 RX ADMIN — Medication SCH EACH: at 18:35

## 2016-07-25 RX ADMIN — Medication SCH EACH: at 09:17

## 2016-07-25 RX ADMIN — Medication SCH MG: at 18:35

## 2016-07-25 RX ADMIN — MAGNESIUM OXIDE TAB 400 MG (241.3 MG ELEMENTAL MG) SCH TAB: 400 (241.3 MG) TAB at 18:35

## 2016-07-25 RX ADMIN — Medication SCH EACH: at 09:19

## 2016-07-25 RX ADMIN — Medication SCH EACH: at 06:41

## 2016-07-25 RX ADMIN — CLOTRIMAZOLE AND BETAMETHASONE DIPROPIONATE SCH APPLIC: 10; .5 CREAM TOPICAL at 22:40

## 2016-07-25 RX ADMIN — ACETAMINOPHEN PRN MG: 650 TABLET, FILM COATED, EXTENDED RELEASE ORAL at 09:18

## 2016-07-25 RX ADMIN — Medication SCH MG: at 09:18

## 2016-07-26 RX ADMIN — Medication SCH UNIT: at 10:34

## 2016-07-26 RX ADMIN — Medication SCH MG: at 18:50

## 2016-07-26 RX ADMIN — ACETAMINOPHEN PRN MG: 650 TABLET, FILM COATED, EXTENDED RELEASE ORAL at 10:35

## 2016-07-26 RX ADMIN — CLOTRIMAZOLE AND BETAMETHASONE DIPROPIONATE SCH: 10; .5 CREAM TOPICAL at 00:26

## 2016-07-26 RX ADMIN — Medication SCH EACH: at 06:09

## 2016-07-26 RX ADMIN — Medication SCH EACH: at 18:50

## 2016-07-26 RX ADMIN — Medication SCH EACH: at 10:37

## 2016-07-26 RX ADMIN — Medication SCH MG: at 10:35

## 2016-07-26 RX ADMIN — MAGNESIUM OXIDE TAB 400 MG (241.3 MG ELEMENTAL MG) SCH TAB: 400 (241.3 MG) TAB at 18:50

## 2016-07-27 RX ADMIN — CLOTRIMAZOLE AND BETAMETHASONE DIPROPIONATE SCH APPLIC: 10; .5 CREAM TOPICAL at 22:00

## 2016-07-27 RX ADMIN — Medication SCH EACH: at 09:29

## 2016-07-27 RX ADMIN — Medication SCH MG: at 18:31

## 2016-07-27 RX ADMIN — Medication SCH UNIT: at 09:29

## 2016-07-27 RX ADMIN — MAGNESIUM OXIDE TAB 400 MG (241.3 MG ELEMENTAL MG) SCH TAB: 400 (241.3 MG) TAB at 18:31

## 2016-07-27 RX ADMIN — ACETAMINOPHEN PRN MG: 650 TABLET, FILM COATED, EXTENDED RELEASE ORAL at 09:30

## 2016-07-27 RX ADMIN — Medication SCH MG: at 09:29

## 2016-07-27 RX ADMIN — Medication SCH EACH: at 06:33

## 2016-07-27 RX ADMIN — Medication SCH EACH: at 18:31

## 2016-07-28 RX ADMIN — Medication SCH EACH: at 18:56

## 2016-07-28 RX ADMIN — Medication SCH EACH: at 06:50

## 2016-07-28 RX ADMIN — Medication SCH EACH: at 10:32

## 2016-07-28 RX ADMIN — Medication SCH MG: at 10:32

## 2016-07-28 RX ADMIN — ACETAMINOPHEN PRN MG: 650 TABLET, FILM COATED, EXTENDED RELEASE ORAL at 10:31

## 2016-07-28 RX ADMIN — Medication SCH UNIT: at 10:32

## 2016-07-28 RX ADMIN — MAGNESIUM OXIDE TAB 400 MG (241.3 MG ELEMENTAL MG) SCH TAB: 400 (241.3 MG) TAB at 18:57

## 2016-07-28 RX ADMIN — Medication SCH MG: at 18:56

## 2016-07-28 RX ADMIN — Medication SCH EACH: at 10:31

## 2016-07-29 RX ADMIN — CLOTRIMAZOLE AND BETAMETHASONE DIPROPIONATE SCH APPLIC: 10; .5 CREAM TOPICAL at 23:31

## 2016-07-29 RX ADMIN — Medication SCH MG: at 18:19

## 2016-07-29 RX ADMIN — Medication SCH MG: at 09:14

## 2016-07-29 RX ADMIN — Medication SCH UNIT: at 09:13

## 2016-07-29 RX ADMIN — Medication SCH EACH: at 18:19

## 2016-07-29 RX ADMIN — Medication SCH EACH: at 06:31

## 2016-07-29 RX ADMIN — Medication SCH EACH: at 18:18

## 2016-07-29 RX ADMIN — MAGNESIUM OXIDE TAB 400 MG (241.3 MG ELEMENTAL MG) SCH TAB: 400 (241.3 MG) TAB at 18:19

## 2016-07-29 RX ADMIN — ACETAMINOPHEN PRN MG: 650 TABLET, FILM COATED, EXTENDED RELEASE ORAL at 09:14

## 2016-07-29 RX ADMIN — Medication SCH EACH: at 09:13

## 2016-07-30 RX ADMIN — Medication SCH EACH: at 18:15

## 2016-07-30 RX ADMIN — MAGNESIUM OXIDE TAB 400 MG (241.3 MG ELEMENTAL MG) SCH TAB: 400 (241.3 MG) TAB at 18:12

## 2016-07-30 RX ADMIN — Medication SCH EACH: at 18:12

## 2016-07-30 RX ADMIN — Medication SCH EACH: at 09:45

## 2016-07-30 RX ADMIN — Medication SCH MG: at 09:45

## 2016-07-30 RX ADMIN — Medication SCH EACH: at 18:11

## 2016-07-30 RX ADMIN — ACETAMINOPHEN PRN MG: 650 TABLET, FILM COATED, EXTENDED RELEASE ORAL at 09:47

## 2016-07-30 RX ADMIN — Medication SCH MG: at 18:12

## 2016-07-30 RX ADMIN — Medication SCH UNIT: at 09:43

## 2016-07-31 RX ADMIN — Medication SCH EACH: at 09:43

## 2016-07-31 RX ADMIN — Medication SCH EACH: at 06:18

## 2016-07-31 RX ADMIN — Medication SCH UNIT: at 09:43

## 2016-07-31 RX ADMIN — MAGNESIUM OXIDE TAB 400 MG (241.3 MG ELEMENTAL MG) SCH TAB: 400 (241.3 MG) TAB at 17:59

## 2016-07-31 RX ADMIN — Medication SCH EACH: at 17:59

## 2016-07-31 RX ADMIN — Medication SCH MG: at 20:16

## 2016-07-31 RX ADMIN — Medication SCH MG: at 17:59

## 2016-07-31 RX ADMIN — ACETAMINOPHEN PRN MG: 650 TABLET, FILM COATED, EXTENDED RELEASE ORAL at 09:45

## 2016-07-31 RX ADMIN — Medication SCH MG: at 09:44

## 2016-08-01 RX ADMIN — Medication SCH EACH: at 18:34

## 2016-08-01 RX ADMIN — Medication SCH EACH: at 06:06

## 2016-08-01 RX ADMIN — Medication SCH MG: at 09:33

## 2016-08-01 RX ADMIN — Medication SCH EACH: at 09:35

## 2016-08-01 RX ADMIN — MAGNESIUM OXIDE TAB 400 MG (241.3 MG ELEMENTAL MG) SCH TAB: 400 (241.3 MG) TAB at 18:34

## 2016-08-01 RX ADMIN — CLOTRIMAZOLE AND BETAMETHASONE DIPROPIONATE SCH APPLIC: 10; .5 CREAM TOPICAL at 23:26

## 2016-08-01 RX ADMIN — Medication SCH UNIT: at 09:33

## 2016-08-01 RX ADMIN — Medication SCH: at 10:40

## 2016-08-01 RX ADMIN — ACETAMINOPHEN PRN MG: 650 TABLET, FILM COATED, EXTENDED RELEASE ORAL at 09:34

## 2016-08-01 RX ADMIN — Medication SCH EACH: at 18:33

## 2016-08-01 RX ADMIN — Medication SCH MG: at 18:33

## 2016-08-01 RX ADMIN — Medication SCH EACH: at 09:33

## 2016-08-02 RX ADMIN — Medication SCH UNIT: at 09:46

## 2016-08-02 RX ADMIN — Medication SCH MG: at 09:47

## 2016-08-02 RX ADMIN — Medication SCH EACH: at 18:01

## 2016-08-02 RX ADMIN — Medication SCH EACH: at 06:19

## 2016-08-02 RX ADMIN — Medication SCH MG: at 18:02

## 2016-08-02 RX ADMIN — MAGNESIUM OXIDE TAB 400 MG (241.3 MG ELEMENTAL MG) SCH TAB: 400 (241.3 MG) TAB at 18:03

## 2016-08-02 RX ADMIN — ACETAMINOPHEN PRN MG: 650 TABLET, FILM COATED, EXTENDED RELEASE ORAL at 09:49

## 2016-08-02 RX ADMIN — Medication SCH EACH: at 09:47

## 2016-08-02 RX ADMIN — Medication SCH MG: at 18:04

## 2016-08-03 RX ADMIN — CLOTRIMAZOLE AND BETAMETHASONE DIPROPIONATE SCH APPLIC: 10; .5 CREAM TOPICAL at 23:16

## 2016-08-03 RX ADMIN — Medication SCH EACH: at 06:19

## 2016-08-03 RX ADMIN — ACETAMINOPHEN PRN MG: 650 TABLET, FILM COATED, EXTENDED RELEASE ORAL at 09:14

## 2016-08-03 RX ADMIN — Medication SCH EACH: at 18:01

## 2016-08-03 RX ADMIN — MAGNESIUM OXIDE TAB 400 MG (241.3 MG ELEMENTAL MG) SCH TAB: 400 (241.3 MG) TAB at 18:02

## 2016-08-03 RX ADMIN — Medication SCH MG: at 18:03

## 2016-08-03 RX ADMIN — Medication SCH UNIT: at 09:12

## 2016-08-03 RX ADMIN — Medication SCH EACH: at 18:08

## 2016-08-03 RX ADMIN — Medication SCH MG: at 18:02

## 2016-08-03 RX ADMIN — Medication SCH EACH: at 09:13

## 2016-08-03 RX ADMIN — Medication SCH MG: at 09:13

## 2016-08-03 RX ADMIN — ACETAMINOPHEN PRN MG: 650 TABLET, FILM COATED, EXTENDED RELEASE ORAL at 18:03

## 2016-08-04 RX ADMIN — Medication SCH MG: at 09:51

## 2016-08-04 RX ADMIN — Medication SCH EACH: at 18:12

## 2016-08-04 RX ADMIN — Medication SCH MG: at 18:13

## 2016-08-04 RX ADMIN — Medication SCH EACH: at 18:13

## 2016-08-04 RX ADMIN — Medication SCH EACH: at 09:50

## 2016-08-04 RX ADMIN — Medication SCH EACH: at 06:08

## 2016-08-04 RX ADMIN — MAGNESIUM OXIDE TAB 400 MG (241.3 MG ELEMENTAL MG) SCH TAB: 400 (241.3 MG) TAB at 18:13

## 2016-08-04 RX ADMIN — Medication SCH UNIT: at 09:52

## 2016-08-04 RX ADMIN — ACETAMINOPHEN PRN MG: 650 TABLET, FILM COATED, EXTENDED RELEASE ORAL at 09:49

## 2016-08-05 RX ADMIN — Medication SCH EACH: at 06:48

## 2016-08-05 RX ADMIN — Medication SCH MG: at 09:57

## 2016-08-05 RX ADMIN — CLOTRIMAZOLE AND BETAMETHASONE DIPROPIONATE SCH: 10; .5 CREAM TOPICAL at 23:11

## 2016-08-05 RX ADMIN — MAGNESIUM OXIDE TAB 400 MG (241.3 MG ELEMENTAL MG) SCH TAB: 400 (241.3 MG) TAB at 18:37

## 2016-08-05 RX ADMIN — Medication SCH EACH: at 09:56

## 2016-08-05 RX ADMIN — ACETAMINOPHEN PRN MG: 650 TABLET, FILM COATED, EXTENDED RELEASE ORAL at 09:56

## 2016-08-05 RX ADMIN — Medication SCH UNIT: at 09:57

## 2016-08-05 RX ADMIN — Medication SCH EACH: at 18:37

## 2016-08-05 RX ADMIN — Medication SCH MG: at 18:37

## 2016-08-06 RX ADMIN — CLOTRIMAZOLE AND BETAMETHASONE DIPROPIONATE SCH APPLIC: 10; .5 CREAM TOPICAL at 23:16

## 2016-08-06 RX ADMIN — MAGNESIUM OXIDE TAB 400 MG (241.3 MG ELEMENTAL MG) SCH TAB: 400 (241.3 MG) TAB at 18:12

## 2016-08-06 RX ADMIN — Medication SCH MG: at 18:11

## 2016-08-06 RX ADMIN — Medication SCH EACH: at 10:07

## 2016-08-06 RX ADMIN — Medication SCH EACH: at 10:08

## 2016-08-06 RX ADMIN — Medication SCH MG: at 18:12

## 2016-08-06 RX ADMIN — Medication SCH EACH: at 18:12

## 2016-08-06 RX ADMIN — Medication SCH MG: at 10:09

## 2016-08-06 RX ADMIN — Medication SCH UNIT: at 10:09

## 2016-08-06 RX ADMIN — ACETAMINOPHEN PRN MG: 650 TABLET, FILM COATED, EXTENDED RELEASE ORAL at 10:07

## 2016-08-07 RX ADMIN — Medication SCH EACH: at 18:05

## 2016-08-07 RX ADMIN — Medication SCH EACH: at 06:38

## 2016-08-07 RX ADMIN — Medication SCH MG: at 18:07

## 2016-08-07 RX ADMIN — ACETAMINOPHEN PRN MG: 650 TABLET, FILM COATED, EXTENDED RELEASE ORAL at 09:07

## 2016-08-07 RX ADMIN — Medication SCH MG: at 09:08

## 2016-08-07 RX ADMIN — MAGNESIUM OXIDE TAB 400 MG (241.3 MG ELEMENTAL MG) SCH TAB: 400 (241.3 MG) TAB at 18:05

## 2016-08-07 RX ADMIN — Medication SCH MG: at 18:05

## 2016-08-07 RX ADMIN — Medication SCH EACH: at 09:07

## 2016-08-07 RX ADMIN — Medication SCH UNIT: at 09:08

## 2016-08-08 RX ADMIN — Medication SCH EACH: at 06:53

## 2016-08-08 RX ADMIN — Medication SCH UNIT: at 09:21

## 2016-08-08 RX ADMIN — Medication SCH MG: at 18:18

## 2016-08-08 RX ADMIN — MAGNESIUM OXIDE TAB 400 MG (241.3 MG ELEMENTAL MG) SCH TAB: 400 (241.3 MG) TAB at 18:18

## 2016-08-08 RX ADMIN — ACETAMINOPHEN PRN MG: 650 TABLET, FILM COATED, EXTENDED RELEASE ORAL at 09:22

## 2016-08-08 RX ADMIN — Medication SCH EACH: at 09:21

## 2016-08-08 RX ADMIN — CLOTRIMAZOLE AND BETAMETHASONE DIPROPIONATE SCH APPLIC: 10; .5 CREAM TOPICAL at 23:27

## 2016-08-08 RX ADMIN — Medication SCH EACH: at 18:18

## 2016-08-08 RX ADMIN — Medication SCH MG: at 09:21

## 2016-08-09 RX ADMIN — Medication SCH EACH: at 05:56

## 2016-08-09 RX ADMIN — Medication SCH UNIT: at 09:26

## 2016-08-09 RX ADMIN — ACETAMINOPHEN PRN MG: 650 TABLET, FILM COATED, EXTENDED RELEASE ORAL at 09:27

## 2016-08-09 RX ADMIN — Medication SCH EACH: at 09:28

## 2016-08-09 RX ADMIN — MAGNESIUM OXIDE TAB 400 MG (241.3 MG ELEMENTAL MG) SCH TAB: 400 (241.3 MG) TAB at 18:11

## 2016-08-09 RX ADMIN — Medication SCH EACH: at 09:26

## 2016-08-09 RX ADMIN — Medication SCH MG: at 18:11

## 2016-08-09 RX ADMIN — Medication SCH MG: at 09:26

## 2016-08-09 RX ADMIN — Medication SCH EACH: at 06:00

## 2016-08-09 RX ADMIN — Medication SCH EACH: at 18:11

## 2016-08-09 NOTE — PN
Progress Note for JENIFER SHEETS  Date:  08/05/2016  Room #:  VM.207

 

SUBJECTIVE:  This in an 87-year-old, on long-term swing bed.  She has had some

trouble hearing.  She has waxed.  She has been getting some ear drops.  She

otherwise says her bottom is better and no longer burns.  She has no other

concerns or complaints.

 

OBJECTIVE:  Vital Signs:  Temperature is 97.5, pulse 66, blood pressure 115/71,

respiratory rate 16, O2 92% on room air.

General:  She is in no acute distress.  Her heart is regular.

Lungs:  Sounds are clear to auscultation bilaterally without crackles or

wheezes.

Extremities:  Warm and dry.  No edema.

HEENT:  Shows bilateral cerumen impaction.

 

ASSESSMENT:

1. Hearing loss due to cerumen impaction.  I will try irrigation this morning,

    if that does not work, she can come over to the clinic at her convenience.

2. Atrophic vaginitis and vaginal irritation, doing better.  She has had some

    PT for stress urinary incontinence.

3. Atrial fibrillation with previous transient ischemic attack on Pradaxa.

4. Mild thrombocytopenia, chronic leg weakness, severe osteoarthritis,

    hypothyroidism, and essential hypertension, controlled.

 

PLAN:  We will recertify in 1 month.  No lab work is due.  Ear irrigation to

help her hearing.

 

 

MKA:  08/05/2016 08:52:11  MODL:  08/05/2016 09:16:12

Job #:  [id number]

## 2016-08-10 RX ADMIN — Medication SCH EACH: at 05:59

## 2016-08-10 RX ADMIN — Medication SCH MG: at 18:14

## 2016-08-10 RX ADMIN — Medication SCH EACH: at 09:20

## 2016-08-10 RX ADMIN — CLOTRIMAZOLE AND BETAMETHASONE DIPROPIONATE SCH APPLIC: 10; .5 CREAM TOPICAL at 23:25

## 2016-08-10 RX ADMIN — Medication SCH UNIT: at 09:21

## 2016-08-10 RX ADMIN — MAGNESIUM OXIDE TAB 400 MG (241.3 MG ELEMENTAL MG) SCH TAB: 400 (241.3 MG) TAB at 18:14

## 2016-08-10 RX ADMIN — Medication SCH EACH: at 18:14

## 2016-08-10 RX ADMIN — ACETAMINOPHEN PRN MG: 650 TABLET, FILM COATED, EXTENDED RELEASE ORAL at 09:21

## 2016-08-10 RX ADMIN — Medication SCH MG: at 09:21

## 2016-08-11 RX ADMIN — Medication SCH UNIT: at 09:42

## 2016-08-11 RX ADMIN — Medication SCH MG: at 18:23

## 2016-08-11 RX ADMIN — Medication SCH MG: at 09:42

## 2016-08-11 RX ADMIN — Medication SCH EACH: at 18:24

## 2016-08-11 RX ADMIN — Medication SCH EACH: at 06:38

## 2016-08-11 RX ADMIN — ACETAMINOPHEN PRN MG: 650 TABLET, FILM COATED, EXTENDED RELEASE ORAL at 09:42

## 2016-08-11 RX ADMIN — MAGNESIUM OXIDE TAB 400 MG (241.3 MG ELEMENTAL MG) SCH TAB: 400 (241.3 MG) TAB at 18:24

## 2016-08-11 RX ADMIN — Medication SCH EACH: at 09:42

## 2016-08-11 RX ADMIN — Medication SCH EACH: at 18:22

## 2016-08-12 RX ADMIN — Medication SCH EACH: at 19:00

## 2016-08-12 RX ADMIN — ACETAMINOPHEN PRN MG: 650 TABLET, FILM COATED, EXTENDED RELEASE ORAL at 09:28

## 2016-08-12 RX ADMIN — CLOTRIMAZOLE AND BETAMETHASONE DIPROPIONATE SCH: 10; .5 CREAM TOPICAL at 23:37

## 2016-08-12 RX ADMIN — Medication SCH EACH: at 09:28

## 2016-08-12 RX ADMIN — Medication SCH EACH: at 06:08

## 2016-08-12 RX ADMIN — Medication SCH EACH: at 09:27

## 2016-08-12 RX ADMIN — MAGNESIUM OXIDE TAB 400 MG (241.3 MG ELEMENTAL MG) SCH TAB: 400 (241.3 MG) TAB at 19:00

## 2016-08-12 RX ADMIN — Medication SCH MG: at 09:27

## 2016-08-12 RX ADMIN — Medication SCH MG: at 19:00

## 2016-08-12 RX ADMIN — Medication SCH UNIT: at 09:27

## 2016-08-13 RX ADMIN — ACETAMINOPHEN PRN MG: 650 TABLET, FILM COATED, EXTENDED RELEASE ORAL at 09:39

## 2016-08-13 RX ADMIN — Medication SCH MG: at 18:24

## 2016-08-13 RX ADMIN — Medication SCH MG: at 09:40

## 2016-08-13 RX ADMIN — Medication SCH EACH: at 18:52

## 2016-08-13 RX ADMIN — Medication SCH EACH: at 18:24

## 2016-08-13 RX ADMIN — Medication SCH EACH: at 09:39

## 2016-08-13 RX ADMIN — MAGNESIUM OXIDE TAB 400 MG (241.3 MG ELEMENTAL MG) SCH TAB: 400 (241.3 MG) TAB at 18:25

## 2016-08-13 RX ADMIN — Medication SCH UNIT: at 09:40

## 2016-08-13 RX ADMIN — Medication SCH MG: at 18:25

## 2016-08-14 RX ADMIN — Medication SCH EACH: at 18:38

## 2016-08-14 RX ADMIN — Medication SCH EACH: at 06:53

## 2016-08-14 RX ADMIN — Medication SCH MG: at 18:10

## 2016-08-14 RX ADMIN — MAGNESIUM OXIDE TAB 400 MG (241.3 MG ELEMENTAL MG) SCH TAB: 400 (241.3 MG) TAB at 18:10

## 2016-08-14 RX ADMIN — Medication SCH UNIT: at 10:13

## 2016-08-14 RX ADMIN — ACETAMINOPHEN PRN MG: 650 TABLET, FILM COATED, EXTENDED RELEASE ORAL at 10:11

## 2016-08-14 RX ADMIN — Medication SCH MG: at 10:13

## 2016-08-14 RX ADMIN — Medication SCH EACH: at 18:10

## 2016-08-14 RX ADMIN — Medication SCH EACH: at 10:12

## 2016-08-15 RX ADMIN — Medication SCH MG: at 18:48

## 2016-08-15 RX ADMIN — Medication SCH EACH: at 10:46

## 2016-08-15 RX ADMIN — Medication SCH UNIT: at 10:51

## 2016-08-15 RX ADMIN — Medication SCH EACH: at 18:51

## 2016-08-15 RX ADMIN — Medication SCH EACH: at 10:51

## 2016-08-15 RX ADMIN — Medication SCH MG: at 10:51

## 2016-08-15 RX ADMIN — MAGNESIUM OXIDE TAB 400 MG (241.3 MG ELEMENTAL MG) SCH TAB: 400 (241.3 MG) TAB at 18:50

## 2016-08-15 RX ADMIN — Medication SCH EACH: at 18:47

## 2016-08-15 RX ADMIN — Medication SCH EACH: at 06:42

## 2016-08-15 RX ADMIN — Medication SCH MG: at 18:50

## 2016-08-15 RX ADMIN — ACETAMINOPHEN PRN MG: 650 TABLET, FILM COATED, EXTENDED RELEASE ORAL at 10:51

## 2016-08-16 RX ADMIN — Medication SCH MG: at 09:49

## 2016-08-16 RX ADMIN — Medication SCH EACH: at 20:49

## 2016-08-16 RX ADMIN — Medication SCH EACH: at 09:48

## 2016-08-16 RX ADMIN — Medication SCH EACH: at 20:48

## 2016-08-16 RX ADMIN — Medication SCH EACH: at 06:14

## 2016-08-16 RX ADMIN — MAGNESIUM OXIDE TAB 400 MG (241.3 MG ELEMENTAL MG) SCH TAB: 400 (241.3 MG) TAB at 20:48

## 2016-08-16 RX ADMIN — ACETAMINOPHEN PRN MG: 650 TABLET, FILM COATED, EXTENDED RELEASE ORAL at 09:49

## 2016-08-16 RX ADMIN — Medication SCH MG: at 20:50

## 2016-08-16 RX ADMIN — Medication SCH UNIT: at 09:49

## 2016-08-16 RX ADMIN — CLOTRIMAZOLE AND BETAMETHASONE DIPROPIONATE SCH: 10; .5 CREAM TOPICAL at 05:19

## 2016-08-16 RX ADMIN — Medication SCH MG: at 20:48

## 2016-08-17 RX ADMIN — Medication SCH EACH: at 18:29

## 2016-08-17 RX ADMIN — Medication SCH MG: at 09:39

## 2016-08-17 RX ADMIN — ACETAMINOPHEN PRN MG: 650 TABLET, FILM COATED, EXTENDED RELEASE ORAL at 09:39

## 2016-08-17 RX ADMIN — Medication SCH MG: at 18:29

## 2016-08-17 RX ADMIN — MAGNESIUM OXIDE TAB 400 MG (241.3 MG ELEMENTAL MG) SCH TAB: 400 (241.3 MG) TAB at 18:29

## 2016-08-17 RX ADMIN — Medication SCH EACH: at 09:38

## 2016-08-17 RX ADMIN — Medication SCH UNIT: at 09:39

## 2016-08-17 RX ADMIN — CLOTRIMAZOLE AND BETAMETHASONE DIPROPIONATE SCH APPLIC: 10; .5 CREAM TOPICAL at 23:08

## 2016-08-17 RX ADMIN — Medication SCH EACH: at 09:39

## 2016-08-18 RX ADMIN — ACETAMINOPHEN PRN MG: 650 TABLET, FILM COATED, EXTENDED RELEASE ORAL at 09:27

## 2016-08-18 RX ADMIN — Medication SCH MG: at 09:26

## 2016-08-18 RX ADMIN — Medication SCH UNIT: at 09:26

## 2016-08-18 RX ADMIN — Medication SCH EACH: at 06:15

## 2016-08-18 RX ADMIN — Medication SCH MG: at 18:17

## 2016-08-18 RX ADMIN — Medication SCH MG: at 18:18

## 2016-08-18 RX ADMIN — Medication SCH EACH: at 18:17

## 2016-08-18 RX ADMIN — Medication SCH EACH: at 09:27

## 2016-08-18 RX ADMIN — Medication SCH EACH: at 18:18

## 2016-08-18 RX ADMIN — Medication SCH EACH: at 09:25

## 2016-08-18 RX ADMIN — MAGNESIUM OXIDE TAB 400 MG (241.3 MG ELEMENTAL MG) SCH TAB: 400 (241.3 MG) TAB at 18:18

## 2016-08-19 RX ADMIN — Medication SCH EACH: at 09:05

## 2016-08-19 RX ADMIN — ACETAMINOPHEN PRN MG: 650 TABLET, FILM COATED, EXTENDED RELEASE ORAL at 18:04

## 2016-08-19 RX ADMIN — Medication SCH EACH: at 06:12

## 2016-08-19 RX ADMIN — ACETAMINOPHEN PRN MG: 650 TABLET, FILM COATED, EXTENDED RELEASE ORAL at 09:06

## 2016-08-19 RX ADMIN — Medication SCH MG: at 09:05

## 2016-08-19 RX ADMIN — Medication SCH UNIT: at 09:04

## 2016-08-19 RX ADMIN — Medication SCH EACH: at 18:03

## 2016-08-19 RX ADMIN — Medication SCH MG: at 18:02

## 2016-08-19 RX ADMIN — Medication SCH EACH: at 18:02

## 2016-08-19 RX ADMIN — MAGNESIUM OXIDE TAB 400 MG (241.3 MG ELEMENTAL MG) SCH TAB: 400 (241.3 MG) TAB at 18:02

## 2016-08-19 RX ADMIN — Medication SCH MG: at 18:03

## 2016-08-20 RX ADMIN — Medication SCH MG: at 09:37

## 2016-08-20 RX ADMIN — CLOTRIMAZOLE AND BETAMETHASONE DIPROPIONATE SCH APPLIC: 10; .5 CREAM TOPICAL at 09:42

## 2016-08-20 RX ADMIN — ACETAMINOPHEN PRN MG: 650 TABLET, FILM COATED, EXTENDED RELEASE ORAL at 09:38

## 2016-08-20 RX ADMIN — Medication SCH EACH: at 18:36

## 2016-08-20 RX ADMIN — MAGNESIUM OXIDE TAB 400 MG (241.3 MG ELEMENTAL MG) SCH TAB: 400 (241.3 MG) TAB at 18:36

## 2016-08-20 RX ADMIN — Medication SCH EACH: at 09:36

## 2016-08-20 RX ADMIN — Medication SCH EACH: at 18:35

## 2016-08-20 RX ADMIN — Medication SCH MG: at 18:35

## 2016-08-20 RX ADMIN — Medication SCH UNIT: at 09:36

## 2016-08-21 RX ADMIN — Medication SCH EACH: at 06:07

## 2016-08-21 RX ADMIN — Medication SCH MG: at 18:25

## 2016-08-21 RX ADMIN — MAGNESIUM OXIDE TAB 400 MG (241.3 MG ELEMENTAL MG) SCH TAB: 400 (241.3 MG) TAB at 18:26

## 2016-08-21 RX ADMIN — Medication SCH EACH: at 10:12

## 2016-08-21 RX ADMIN — Medication SCH MG: at 10:13

## 2016-08-21 RX ADMIN — ACETAMINOPHEN PRN MG: 650 TABLET, FILM COATED, EXTENDED RELEASE ORAL at 10:14

## 2016-08-21 RX ADMIN — Medication SCH EACH: at 10:02

## 2016-08-21 RX ADMIN — Medication SCH EACH: at 18:26

## 2016-08-21 RX ADMIN — Medication SCH UNIT: at 10:12

## 2016-08-22 RX ADMIN — Medication SCH EACH: at 18:32

## 2016-08-22 RX ADMIN — Medication SCH EACH: at 06:09

## 2016-08-22 RX ADMIN — Medication SCH MG: at 09:06

## 2016-08-22 RX ADMIN — Medication SCH EACH: at 09:06

## 2016-08-22 RX ADMIN — CLOBETASOL PROPIONATE PRN APPLIC: 0.5 CREAM TOPICAL at 22:51

## 2016-08-22 RX ADMIN — Medication SCH MG: at 18:32

## 2016-08-22 RX ADMIN — MAGNESIUM OXIDE TAB 400 MG (241.3 MG ELEMENTAL MG) SCH TAB: 400 (241.3 MG) TAB at 18:33

## 2016-08-22 RX ADMIN — ACETAMINOPHEN PRN MG: 650 TABLET, FILM COATED, EXTENDED RELEASE ORAL at 09:07

## 2016-08-22 RX ADMIN — Medication SCH EACH: at 18:33

## 2016-08-22 RX ADMIN — Medication SCH UNIT: at 09:06

## 2016-08-22 RX ADMIN — Medication SCH MG: at 18:33

## 2016-08-23 RX ADMIN — MAGNESIUM OXIDE TAB 400 MG (241.3 MG ELEMENTAL MG) SCH TAB: 400 (241.3 MG) TAB at 18:00

## 2016-08-23 RX ADMIN — Medication SCH EACH: at 10:47

## 2016-08-23 RX ADMIN — ACETAMINOPHEN PRN MG: 650 TABLET, FILM COATED, EXTENDED RELEASE ORAL at 10:47

## 2016-08-23 RX ADMIN — Medication SCH UNIT: at 10:46

## 2016-08-23 RX ADMIN — Medication SCH MG: at 10:44

## 2016-08-23 RX ADMIN — CLOTRIMAZOLE AND BETAMETHASONE DIPROPIONATE SCH APPLIC: 10; .5 CREAM TOPICAL at 06:43

## 2016-08-23 RX ADMIN — Medication SCH EACH: at 18:00

## 2016-08-23 RX ADMIN — Medication SCH MG: at 18:00

## 2016-08-23 RX ADMIN — Medication SCH EACH: at 06:44

## 2016-08-24 RX ADMIN — ACETAMINOPHEN PRN MG: 650 TABLET, FILM COATED, EXTENDED RELEASE ORAL at 09:12

## 2016-08-24 RX ADMIN — CLOTRIMAZOLE AND BETAMETHASONE DIPROPIONATE SCH APPLIC: 10; .5 CREAM TOPICAL at 22:46

## 2016-08-24 RX ADMIN — Medication SCH UNIT: at 09:12

## 2016-08-24 RX ADMIN — Medication SCH EACH: at 06:38

## 2016-08-24 RX ADMIN — Medication SCH MG: at 09:12

## 2016-08-24 RX ADMIN — Medication SCH EACH: at 09:13

## 2016-08-24 RX ADMIN — Medication SCH MG: at 18:11

## 2016-08-24 RX ADMIN — Medication SCH EACH: at 09:11

## 2016-08-24 RX ADMIN — Medication SCH EACH: at 18:11

## 2016-08-24 RX ADMIN — MAGNESIUM OXIDE TAB 400 MG (241.3 MG ELEMENTAL MG) SCH TAB: 400 (241.3 MG) TAB at 18:11

## 2016-08-25 RX ADMIN — Medication SCH MG: at 18:15

## 2016-08-25 RX ADMIN — Medication SCH UNIT: at 09:04

## 2016-08-25 RX ADMIN — Medication SCH EACH: at 05:59

## 2016-08-25 RX ADMIN — ACETAMINOPHEN PRN MG: 650 TABLET, FILM COATED, EXTENDED RELEASE ORAL at 09:05

## 2016-08-25 RX ADMIN — Medication SCH EACH: at 09:04

## 2016-08-25 RX ADMIN — MAGNESIUM OXIDE TAB 400 MG (241.3 MG ELEMENTAL MG) SCH TAB: 400 (241.3 MG) TAB at 18:16

## 2016-08-25 RX ADMIN — Medication SCH EACH: at 18:15

## 2016-08-25 RX ADMIN — Medication SCH EACH: at 18:16

## 2016-08-25 RX ADMIN — Medication SCH MG: at 09:04

## 2016-08-25 RX ADMIN — ACETAMINOPHEN PRN MG: 650 TABLET, FILM COATED, EXTENDED RELEASE ORAL at 16:29

## 2016-08-26 RX ADMIN — Medication SCH EACH: at 18:20

## 2016-08-26 RX ADMIN — MAGNESIUM OXIDE TAB 400 MG (241.3 MG ELEMENTAL MG) SCH TAB: 400 (241.3 MG) TAB at 18:20

## 2016-08-26 RX ADMIN — Medication SCH EACH: at 09:26

## 2016-08-26 RX ADMIN — Medication SCH EACH: at 18:19

## 2016-08-26 RX ADMIN — ACETAMINOPHEN PRN MG: 650 TABLET, FILM COATED, EXTENDED RELEASE ORAL at 09:26

## 2016-08-26 RX ADMIN — Medication SCH MG: at 18:20

## 2016-08-26 RX ADMIN — Medication SCH EACH: at 06:05

## 2016-08-26 RX ADMIN — Medication SCH UNIT: at 09:25

## 2016-08-26 RX ADMIN — CLOTRIMAZOLE AND BETAMETHASONE DIPROPIONATE SCH APPLIC: 10; .5 CREAM TOPICAL at 20:48

## 2016-08-26 RX ADMIN — Medication SCH MG: at 09:26

## 2016-08-27 RX ADMIN — Medication SCH MG: at 18:24

## 2016-08-27 RX ADMIN — Medication SCH UNIT: at 09:25

## 2016-08-27 RX ADMIN — Medication SCH EACH: at 09:24

## 2016-08-27 RX ADMIN — Medication SCH MG: at 09:25

## 2016-08-27 RX ADMIN — ACETAMINOPHEN PRN MG: 650 TABLET, FILM COATED, EXTENDED RELEASE ORAL at 09:25

## 2016-08-27 RX ADMIN — Medication SCH EACH: at 18:24

## 2016-08-27 RX ADMIN — CLOTRIMAZOLE AND BETAMETHASONE DIPROPIONATE SCH: 10; .5 CREAM TOPICAL at 00:35

## 2016-08-27 RX ADMIN — MAGNESIUM OXIDE TAB 400 MG (241.3 MG ELEMENTAL MG) SCH TAB: 400 (241.3 MG) TAB at 18:24

## 2016-08-28 RX ADMIN — ACETAMINOPHEN PRN MG: 650 TABLET, FILM COATED, EXTENDED RELEASE ORAL at 10:08

## 2016-08-28 RX ADMIN — Medication SCH MG: at 19:18

## 2016-08-28 RX ADMIN — MAGNESIUM OXIDE TAB 400 MG (241.3 MG ELEMENTAL MG) SCH TAB: 400 (241.3 MG) TAB at 19:18

## 2016-08-28 RX ADMIN — Medication SCH EACH: at 19:17

## 2016-08-28 RX ADMIN — Medication SCH EACH: at 06:04

## 2016-08-28 RX ADMIN — Medication SCH MG: at 10:08

## 2016-08-28 RX ADMIN — Medication SCH UNIT: at 10:08

## 2016-08-28 RX ADMIN — Medication SCH EACH: at 19:18

## 2016-08-28 RX ADMIN — Medication SCH MG: at 19:19

## 2016-08-28 RX ADMIN — Medication SCH EACH: at 10:07

## 2016-08-29 RX ADMIN — Medication SCH EACH: at 06:16

## 2016-08-29 RX ADMIN — Medication SCH MG: at 18:15

## 2016-08-29 RX ADMIN — ACETAMINOPHEN PRN MG: 650 TABLET, FILM COATED, EXTENDED RELEASE ORAL at 09:53

## 2016-08-29 RX ADMIN — CLOTRIMAZOLE AND BETAMETHASONE DIPROPIONATE SCH APPLIC: 10; .5 CREAM TOPICAL at 23:03

## 2016-08-29 RX ADMIN — ACETAMINOPHEN PRN MG: 650 TABLET, FILM COATED, EXTENDED RELEASE ORAL at 18:17

## 2016-08-29 RX ADMIN — Medication SCH EACH: at 09:52

## 2016-08-29 RX ADMIN — MAGNESIUM OXIDE TAB 400 MG (241.3 MG ELEMENTAL MG) SCH TAB: 400 (241.3 MG) TAB at 18:15

## 2016-08-29 RX ADMIN — Medication SCH UNIT: at 09:52

## 2016-08-29 RX ADMIN — Medication SCH MG: at 09:52

## 2016-08-29 RX ADMIN — Medication SCH EACH: at 18:15

## 2016-08-30 RX ADMIN — Medication SCH EACH: at 18:08

## 2016-08-30 RX ADMIN — Medication SCH MG: at 18:07

## 2016-08-30 RX ADMIN — Medication SCH EACH: at 09:33

## 2016-08-30 RX ADMIN — Medication SCH EACH: at 09:31

## 2016-08-30 RX ADMIN — Medication SCH EACH: at 06:14

## 2016-08-30 RX ADMIN — Medication SCH MG: at 09:28

## 2016-08-30 RX ADMIN — ACETAMINOPHEN PRN MG: 650 TABLET, FILM COATED, EXTENDED RELEASE ORAL at 11:03

## 2016-08-30 RX ADMIN — Medication SCH EACH: at 18:07

## 2016-08-30 RX ADMIN — MAGNESIUM OXIDE TAB 400 MG (241.3 MG ELEMENTAL MG) SCH TAB: 400 (241.3 MG) TAB at 18:06

## 2016-08-30 RX ADMIN — Medication SCH UNIT: at 09:30

## 2016-08-31 RX ADMIN — Medication SCH UNIT: at 09:52

## 2016-08-31 RX ADMIN — Medication SCH MG: at 19:59

## 2016-08-31 RX ADMIN — ACETAMINOPHEN PRN MG: 650 TABLET, FILM COATED, EXTENDED RELEASE ORAL at 09:50

## 2016-08-31 RX ADMIN — Medication SCH EACH: at 20:01

## 2016-08-31 RX ADMIN — Medication SCH MG: at 20:00

## 2016-08-31 RX ADMIN — Medication SCH: at 06:00

## 2016-08-31 RX ADMIN — CLOTRIMAZOLE AND BETAMETHASONE DIPROPIONATE SCH APPLIC: 10; .5 CREAM TOPICAL at 23:09

## 2016-08-31 RX ADMIN — Medication SCH EACH: at 19:59

## 2016-08-31 RX ADMIN — Medication SCH MG: at 09:53

## 2016-08-31 RX ADMIN — Medication SCH EACH: at 05:55

## 2016-08-31 RX ADMIN — MAGNESIUM OXIDE TAB 400 MG (241.3 MG ELEMENTAL MG) SCH TAB: 400 (241.3 MG) TAB at 19:59

## 2016-08-31 RX ADMIN — Medication SCH EACH: at 09:51

## 2016-09-01 RX ADMIN — Medication SCH EACH: at 18:22

## 2016-09-01 RX ADMIN — Medication SCH UNIT: at 09:39

## 2016-09-01 RX ADMIN — Medication SCH EACH: at 09:40

## 2016-09-01 RX ADMIN — Medication SCH EACH: at 18:23

## 2016-09-01 RX ADMIN — Medication SCH MG: at 09:39

## 2016-09-01 RX ADMIN — ACETAMINOPHEN PRN MG: 650 TABLET, FILM COATED, EXTENDED RELEASE ORAL at 18:23

## 2016-09-01 RX ADMIN — ACETAMINOPHEN PRN MG: 650 TABLET, FILM COATED, EXTENDED RELEASE ORAL at 09:41

## 2016-09-01 RX ADMIN — Medication SCH EACH: at 06:39

## 2016-09-01 RX ADMIN — MAGNESIUM OXIDE TAB 400 MG (241.3 MG ELEMENTAL MG) SCH TAB: 400 (241.3 MG) TAB at 18:23

## 2016-09-01 RX ADMIN — Medication SCH MG: at 18:23

## 2016-09-02 RX ADMIN — Medication SCH EACH: at 18:18

## 2016-09-02 RX ADMIN — Medication SCH MG: at 18:18

## 2016-09-02 RX ADMIN — ACETAMINOPHEN PRN MG: 650 TABLET, FILM COATED, EXTENDED RELEASE ORAL at 09:40

## 2016-09-02 RX ADMIN — Medication SCH EACH: at 08:26

## 2016-09-02 RX ADMIN — Medication SCH UNIT: at 09:40

## 2016-09-02 RX ADMIN — Medication SCH EACH: at 09:41

## 2016-09-02 RX ADMIN — Medication SCH EACH: at 18:17

## 2016-09-02 RX ADMIN — Medication SCH EACH: at 09:39

## 2016-09-02 RX ADMIN — Medication SCH MG: at 09:40

## 2016-09-02 RX ADMIN — MAGNESIUM OXIDE TAB 400 MG (241.3 MG ELEMENTAL MG) SCH TAB: 400 (241.3 MG) TAB at 18:18

## 2016-09-02 RX ADMIN — Medication SCH MG: at 18:17

## 2016-09-02 RX ADMIN — CLOTRIMAZOLE AND BETAMETHASONE DIPROPIONATE SCH APPLIC: 10; .5 CREAM TOPICAL at 22:59

## 2016-09-03 RX ADMIN — ACETAMINOPHEN PRN MG: 650 TABLET, FILM COATED, EXTENDED RELEASE ORAL at 09:46

## 2016-09-03 RX ADMIN — Medication SCH MG: at 09:43

## 2016-09-03 RX ADMIN — Medication SCH MG: at 18:14

## 2016-09-03 RX ADMIN — Medication SCH EACH: at 18:14

## 2016-09-03 RX ADMIN — Medication SCH EACH: at 09:44

## 2016-09-03 RX ADMIN — Medication SCH UNIT: at 09:43

## 2016-09-03 RX ADMIN — Medication SCH MG: at 18:13

## 2016-09-03 RX ADMIN — Medication SCH EACH: at 18:15

## 2016-09-03 RX ADMIN — MAGNESIUM OXIDE TAB 400 MG (241.3 MG ELEMENTAL MG) SCH TAB: 400 (241.3 MG) TAB at 18:13

## 2016-09-04 RX ADMIN — Medication SCH EACH: at 18:14

## 2016-09-04 RX ADMIN — Medication SCH MG: at 18:14

## 2016-09-04 RX ADMIN — Medication SCH EACH: at 09:53

## 2016-09-04 RX ADMIN — Medication SCH EACH: at 06:47

## 2016-09-04 RX ADMIN — Medication SCH UNIT: at 09:53

## 2016-09-04 RX ADMIN — ACETAMINOPHEN PRN MG: 650 TABLET, FILM COATED, EXTENDED RELEASE ORAL at 09:54

## 2016-09-04 RX ADMIN — MAGNESIUM OXIDE TAB 400 MG (241.3 MG ELEMENTAL MG) SCH TAB: 400 (241.3 MG) TAB at 18:14

## 2016-09-04 RX ADMIN — Medication SCH EACH: at 18:13

## 2016-09-04 RX ADMIN — Medication SCH MG: at 09:53

## 2016-09-04 RX ADMIN — Medication SCH MG: at 18:13

## 2016-09-05 RX ADMIN — Medication SCH MG: at 18:05

## 2016-09-05 RX ADMIN — Medication SCH EACH: at 09:12

## 2016-09-05 RX ADMIN — Medication SCH EACH: at 18:05

## 2016-09-05 RX ADMIN — Medication SCH EACH: at 18:04

## 2016-09-05 RX ADMIN — Medication SCH MG: at 09:12

## 2016-09-05 RX ADMIN — ACETAMINOPHEN PRN MG: 650 TABLET, FILM COATED, EXTENDED RELEASE ORAL at 09:12

## 2016-09-05 RX ADMIN — Medication SCH UNIT: at 09:12

## 2016-09-05 RX ADMIN — MAGNESIUM OXIDE TAB 400 MG (241.3 MG ELEMENTAL MG) SCH TAB: 400 (241.3 MG) TAB at 18:05

## 2016-09-05 RX ADMIN — Medication SCH EACH: at 06:12

## 2016-09-06 RX ADMIN — ACETAMINOPHEN PRN MG: 650 TABLET, FILM COATED, EXTENDED RELEASE ORAL at 18:12

## 2016-09-06 RX ADMIN — Medication SCH EACH: at 18:11

## 2016-09-06 RX ADMIN — MAGNESIUM OXIDE TAB 400 MG (241.3 MG ELEMENTAL MG) SCH TAB: 400 (241.3 MG) TAB at 18:09

## 2016-09-06 RX ADMIN — ACETAMINOPHEN PRN MG: 650 TABLET, FILM COATED, EXTENDED RELEASE ORAL at 09:36

## 2016-09-06 RX ADMIN — CLOTRIMAZOLE AND BETAMETHASONE DIPROPIONATE SCH: 10; .5 CREAM TOPICAL at 00:08

## 2016-09-06 RX ADMIN — Medication SCH EACH: at 09:36

## 2016-09-06 RX ADMIN — Medication SCH EACH: at 18:09

## 2016-09-06 RX ADMIN — Medication SCH EACH: at 06:26

## 2016-09-06 RX ADMIN — Medication SCH MG: at 18:10

## 2016-09-06 RX ADMIN — Medication SCH UNIT: at 09:35

## 2016-09-06 RX ADMIN — Medication SCH MG: at 18:09

## 2016-09-06 RX ADMIN — Medication SCH MG: at 09:35

## 2016-09-07 RX ADMIN — Medication SCH EACH: at 06:17

## 2016-09-07 RX ADMIN — ACETAMINOPHEN PRN MG: 650 TABLET, FILM COATED, EXTENDED RELEASE ORAL at 09:01

## 2016-09-07 RX ADMIN — Medication SCH EACH: at 09:01

## 2016-09-07 RX ADMIN — Medication SCH UNIT: at 09:01

## 2016-09-07 RX ADMIN — Medication SCH EACH: at 20:01

## 2016-09-07 RX ADMIN — Medication SCH MG: at 19:58

## 2016-09-07 RX ADMIN — Medication SCH MG: at 19:59

## 2016-09-07 RX ADMIN — Medication SCH MG: at 09:01

## 2016-09-07 RX ADMIN — Medication SCH EACH: at 19:59

## 2016-09-07 RX ADMIN — MAGNESIUM OXIDE TAB 400 MG (241.3 MG ELEMENTAL MG) SCH TAB: 400 (241.3 MG) TAB at 19:59

## 2016-09-07 RX ADMIN — Medication SCH EACH: at 20:00

## 2016-09-08 RX ADMIN — Medication SCH EACH: at 10:47

## 2016-09-08 RX ADMIN — Medication SCH MG: at 18:02

## 2016-09-08 RX ADMIN — Medication SCH MG: at 10:46

## 2016-09-08 RX ADMIN — Medication SCH EACH: at 10:50

## 2016-09-08 RX ADMIN — Medication SCH UNIT: at 10:47

## 2016-09-08 RX ADMIN — Medication SCH EACH: at 18:03

## 2016-09-08 RX ADMIN — ACETAMINOPHEN PRN MG: 650 TABLET, FILM COATED, EXTENDED RELEASE ORAL at 10:48

## 2016-09-08 RX ADMIN — Medication SCH: at 00:09

## 2016-09-08 RX ADMIN — Medication SCH EACH: at 18:02

## 2016-09-08 RX ADMIN — MAGNESIUM OXIDE TAB 400 MG (241.3 MG ELEMENTAL MG) SCH TAB: 400 (241.3 MG) TAB at 18:02

## 2016-09-08 RX ADMIN — Medication SCH EACH: at 06:09

## 2016-09-09 RX ADMIN — Medication SCH: at 23:10

## 2016-09-09 RX ADMIN — Medication SCH EACH: at 05:59

## 2016-09-09 RX ADMIN — ACETAMINOPHEN PRN MG: 650 TABLET, FILM COATED, EXTENDED RELEASE ORAL at 09:22

## 2016-09-09 RX ADMIN — Medication SCH UNIT: at 09:22

## 2016-09-09 RX ADMIN — MAGNESIUM OXIDE TAB 400 MG (241.3 MG ELEMENTAL MG) SCH TAB: 400 (241.3 MG) TAB at 18:15

## 2016-09-09 RX ADMIN — Medication SCH EACH: at 18:14

## 2016-09-09 RX ADMIN — Medication SCH MG: at 09:22

## 2016-09-09 RX ADMIN — Medication SCH EACH: at 18:13

## 2016-09-09 RX ADMIN — Medication SCH EACH: at 09:22

## 2016-09-09 RX ADMIN — Medication SCH MG: at 18:14

## 2016-09-10 RX ADMIN — MAGNESIUM OXIDE TAB 400 MG (241.3 MG ELEMENTAL MG) SCH TAB: 400 (241.3 MG) TAB at 18:12

## 2016-09-10 RX ADMIN — Medication SCH UNIT: at 09:27

## 2016-09-10 RX ADMIN — Medication SCH EACH: at 18:12

## 2016-09-10 RX ADMIN — Medication SCH EACH: at 09:26

## 2016-09-10 RX ADMIN — Medication SCH MG: at 18:12

## 2016-09-10 RX ADMIN — Medication SCH MG: at 09:27

## 2016-09-10 RX ADMIN — Medication SCH EACH: at 22:45

## 2016-09-10 RX ADMIN — ACETAMINOPHEN PRN MG: 650 TABLET, FILM COATED, EXTENDED RELEASE ORAL at 09:27

## 2016-09-11 RX ADMIN — MAGNESIUM OXIDE TAB 400 MG (241.3 MG ELEMENTAL MG) SCH TAB: 400 (241.3 MG) TAB at 17:59

## 2016-09-11 RX ADMIN — Medication SCH MG: at 18:00

## 2016-09-11 RX ADMIN — Medication SCH EACH: at 10:29

## 2016-09-11 RX ADMIN — Medication SCH UNIT: at 10:39

## 2016-09-11 RX ADMIN — Medication SCH EACH: at 06:11

## 2016-09-11 RX ADMIN — Medication SCH EACH: at 10:39

## 2016-09-11 RX ADMIN — Medication SCH MG: at 17:59

## 2016-09-11 RX ADMIN — Medication SCH EACH: at 18:00

## 2016-09-11 RX ADMIN — Medication SCH MG: at 10:39

## 2016-09-11 RX ADMIN — Medication SCH EACH: at 17:59

## 2016-09-11 RX ADMIN — ACETAMINOPHEN PRN MG: 650 TABLET, FILM COATED, EXTENDED RELEASE ORAL at 10:38

## 2016-09-12 RX ADMIN — Medication SCH MG: at 18:04

## 2016-09-12 RX ADMIN — Medication SCH UNIT: at 10:35

## 2016-09-12 RX ADMIN — Medication SCH MG: at 10:35

## 2016-09-12 RX ADMIN — Medication SCH EACH: at 10:35

## 2016-09-12 RX ADMIN — Medication SCH EACH: at 23:02

## 2016-09-12 RX ADMIN — MAGNESIUM OXIDE TAB 400 MG (241.3 MG ELEMENTAL MG) SCH TAB: 400 (241.3 MG) TAB at 18:03

## 2016-09-12 RX ADMIN — ACETAMINOPHEN PRN MG: 650 TABLET, FILM COATED, EXTENDED RELEASE ORAL at 10:36

## 2016-09-12 RX ADMIN — Medication SCH EACH: at 06:19

## 2016-09-12 RX ADMIN — Medication SCH EACH: at 18:03

## 2016-09-13 RX ADMIN — Medication SCH UNIT: at 11:08

## 2016-09-13 RX ADMIN — Medication SCH EACH: at 11:09

## 2016-09-13 RX ADMIN — Medication SCH EACH: at 18:25

## 2016-09-13 RX ADMIN — MAGNESIUM OXIDE TAB 400 MG (241.3 MG ELEMENTAL MG) SCH TAB: 400 (241.3 MG) TAB at 18:25

## 2016-09-13 RX ADMIN — Medication SCH MG: at 18:25

## 2016-09-13 RX ADMIN — ACETAMINOPHEN PRN MG: 650 TABLET, FILM COATED, EXTENDED RELEASE ORAL at 11:11

## 2016-09-13 RX ADMIN — Medication SCH MG: at 11:10

## 2016-09-13 RX ADMIN — Medication SCH EACH: at 18:24

## 2016-09-13 RX ADMIN — Medication SCH EACH: at 06:14

## 2016-09-14 RX ADMIN — Medication SCH EACH: at 18:39

## 2016-09-14 RX ADMIN — Medication SCH EACH: at 06:22

## 2016-09-14 RX ADMIN — Medication SCH UNIT: at 10:14

## 2016-09-14 RX ADMIN — MAGNESIUM OXIDE TAB 400 MG (241.3 MG ELEMENTAL MG) SCH TAB: 400 (241.3 MG) TAB at 18:40

## 2016-09-14 RX ADMIN — Medication SCH EACH: at 10:15

## 2016-09-14 RX ADMIN — Medication SCH EACH: at 21:35

## 2016-09-14 RX ADMIN — Medication SCH MG: at 18:40

## 2016-09-14 RX ADMIN — Medication SCH MG: at 10:14

## 2016-09-14 RX ADMIN — Medication SCH EACH: at 10:14

## 2016-09-14 RX ADMIN — ACETAMINOPHEN PRN MG: 650 TABLET, FILM COATED, EXTENDED RELEASE ORAL at 10:14

## 2016-09-14 RX ADMIN — Medication SCH MG: at 18:39

## 2016-09-15 RX ADMIN — Medication SCH MG: at 18:04

## 2016-09-15 RX ADMIN — Medication SCH UNIT: at 09:52

## 2016-09-15 RX ADMIN — Medication SCH EACH: at 09:52

## 2016-09-15 RX ADMIN — Medication SCH MG: at 09:52

## 2016-09-15 RX ADMIN — Medication SCH MG: at 18:03

## 2016-09-15 RX ADMIN — Medication SCH EACH: at 18:03

## 2016-09-15 RX ADMIN — MAGNESIUM OXIDE TAB 400 MG (241.3 MG ELEMENTAL MG) SCH TAB: 400 (241.3 MG) TAB at 18:03

## 2016-09-15 RX ADMIN — Medication SCH EACH: at 06:30

## 2016-09-15 RX ADMIN — ACETAMINOPHEN PRN MG: 650 TABLET, FILM COATED, EXTENDED RELEASE ORAL at 09:52

## 2016-09-16 RX ADMIN — ACETAMINOPHEN PRN MG: 650 TABLET, FILM COATED, EXTENDED RELEASE ORAL at 09:20

## 2016-09-16 RX ADMIN — Medication SCH EACH: at 18:07

## 2016-09-16 RX ADMIN — MAGNESIUM OXIDE TAB 400 MG (241.3 MG ELEMENTAL MG) SCH TAB: 400 (241.3 MG) TAB at 18:07

## 2016-09-16 RX ADMIN — Medication SCH EACH: at 09:19

## 2016-09-16 RX ADMIN — Medication SCH EACH: at 18:06

## 2016-09-16 RX ADMIN — Medication SCH MG: at 09:19

## 2016-09-16 RX ADMIN — Medication SCH UNIT: at 09:19

## 2016-09-16 RX ADMIN — Medication SCH MG: at 18:07

## 2016-09-16 RX ADMIN — Medication SCH EACH: at 06:13

## 2016-09-16 RX ADMIN — Medication SCH EACH: at 23:23

## 2016-09-17 RX ADMIN — ACETAMINOPHEN PRN MG: 650 TABLET, FILM COATED, EXTENDED RELEASE ORAL at 10:11

## 2016-09-17 RX ADMIN — Medication SCH MG: at 18:13

## 2016-09-17 RX ADMIN — Medication SCH EACH: at 18:13

## 2016-09-17 RX ADMIN — Medication SCH EACH: at 10:10

## 2016-09-17 RX ADMIN — MAGNESIUM OXIDE TAB 400 MG (241.3 MG ELEMENTAL MG) SCH TAB: 400 (241.3 MG) TAB at 18:12

## 2016-09-17 RX ADMIN — Medication SCH MG: at 10:10

## 2016-09-17 RX ADMIN — Medication SCH UNIT: at 10:10

## 2016-09-17 RX ADMIN — Medication SCH EACH: at 10:13

## 2016-09-18 RX ADMIN — Medication SCH UNIT: at 08:45

## 2016-09-18 RX ADMIN — Medication SCH EACH: at 06:49

## 2016-09-18 RX ADMIN — Medication SCH MG: at 08:44

## 2016-09-18 RX ADMIN — ACETAMINOPHEN PRN MG: 650 TABLET, FILM COATED, EXTENDED RELEASE ORAL at 08:46

## 2016-09-18 RX ADMIN — Medication SCH MG: at 18:30

## 2016-09-18 RX ADMIN — Medication SCH EACH: at 18:30

## 2016-09-18 RX ADMIN — Medication SCH: at 10:22

## 2016-09-18 RX ADMIN — Medication SCH EACH: at 08:45

## 2016-09-18 RX ADMIN — Medication SCH EACH: at 18:31

## 2016-09-18 RX ADMIN — MAGNESIUM OXIDE TAB 400 MG (241.3 MG ELEMENTAL MG) SCH TAB: 400 (241.3 MG) TAB at 18:30

## 2016-09-18 RX ADMIN — Medication SCH MG: at 18:31

## 2016-09-19 RX ADMIN — Medication SCH MG: at 20:17

## 2016-09-19 RX ADMIN — Medication SCH MG: at 09:17

## 2016-09-19 RX ADMIN — ACETAMINOPHEN PRN MG: 650 TABLET, FILM COATED, EXTENDED RELEASE ORAL at 09:18

## 2016-09-19 RX ADMIN — Medication SCH EACH: at 09:17

## 2016-09-19 RX ADMIN — MAGNESIUM OXIDE TAB 400 MG (241.3 MG ELEMENTAL MG) SCH TAB: 400 (241.3 MG) TAB at 20:18

## 2016-09-19 RX ADMIN — Medication SCH EACH: at 20:16

## 2016-09-19 RX ADMIN — Medication SCH EACH: at 23:02

## 2016-09-19 RX ADMIN — Medication SCH UNIT: at 09:17

## 2016-09-19 RX ADMIN — Medication SCH MG: at 20:18

## 2016-09-20 RX ADMIN — ACETAMINOPHEN PRN MG: 650 TABLET, FILM COATED, EXTENDED RELEASE ORAL at 09:18

## 2016-09-20 RX ADMIN — Medication SCH EACH: at 18:24

## 2016-09-20 RX ADMIN — Medication SCH MG: at 09:16

## 2016-09-20 RX ADMIN — Medication SCH EACH: at 09:19

## 2016-09-20 RX ADMIN — Medication SCH MG: at 18:25

## 2016-09-20 RX ADMIN — Medication SCH EACH: at 18:23

## 2016-09-20 RX ADMIN — Medication SCH EACH: at 06:32

## 2016-09-20 RX ADMIN — Medication SCH UNIT: at 09:17

## 2016-09-20 RX ADMIN — MAGNESIUM OXIDE TAB 400 MG (241.3 MG ELEMENTAL MG) SCH TAB: 400 (241.3 MG) TAB at 18:24

## 2016-09-20 RX ADMIN — Medication SCH MG: at 18:24

## 2016-09-20 RX ADMIN — Medication SCH EACH: at 09:16

## 2016-09-21 RX ADMIN — ACETAMINOPHEN PRN MG: 650 TABLET, FILM COATED, EXTENDED RELEASE ORAL at 09:44

## 2016-09-21 RX ADMIN — Medication SCH EACH: at 06:23

## 2016-09-21 RX ADMIN — Medication SCH UNIT: at 09:43

## 2016-09-21 RX ADMIN — Medication SCH EACH: at 18:20

## 2016-09-21 RX ADMIN — Medication SCH MG: at 09:43

## 2016-09-21 RX ADMIN — Medication SCH EACH: at 09:42

## 2016-09-21 RX ADMIN — MAGNESIUM OXIDE TAB 400 MG (241.3 MG ELEMENTAL MG) SCH TAB: 400 (241.3 MG) TAB at 18:21

## 2016-09-21 RX ADMIN — Medication SCH MG: at 18:21

## 2016-09-21 RX ADMIN — Medication SCH EACH: at 18:21

## 2016-09-22 RX ADMIN — ACETAMINOPHEN PRN MG: 650 TABLET, FILM COATED, EXTENDED RELEASE ORAL at 10:37

## 2016-09-22 RX ADMIN — Medication SCH: at 01:30

## 2016-09-22 RX ADMIN — Medication SCH MG: at 18:10

## 2016-09-22 RX ADMIN — Medication SCH UNIT: at 10:35

## 2016-09-22 RX ADMIN — Medication SCH EACH: at 10:36

## 2016-09-22 RX ADMIN — MAGNESIUM OXIDE TAB 400 MG (241.3 MG ELEMENTAL MG) SCH TAB: 400 (241.3 MG) TAB at 18:10

## 2016-09-22 RX ADMIN — Medication SCH EACH: at 18:10

## 2016-09-22 RX ADMIN — Medication SCH MG: at 10:36

## 2016-09-22 RX ADMIN — Medication SCH EACH: at 06:50

## 2016-09-23 RX ADMIN — Medication SCH EACH: at 09:50

## 2016-09-23 RX ADMIN — Medication SCH EACH: at 09:48

## 2016-09-23 RX ADMIN — Medication SCH EACH: at 18:03

## 2016-09-23 RX ADMIN — Medication SCH MG: at 18:03

## 2016-09-23 RX ADMIN — Medication SCH MG: at 18:02

## 2016-09-23 RX ADMIN — Medication SCH EACH: at 18:02

## 2016-09-23 RX ADMIN — ACETAMINOPHEN PRN MG: 650 TABLET, FILM COATED, EXTENDED RELEASE ORAL at 09:49

## 2016-09-23 RX ADMIN — Medication SCH: at 22:26

## 2016-09-23 RX ADMIN — MAGNESIUM OXIDE TAB 400 MG (241.3 MG ELEMENTAL MG) SCH TAB: 400 (241.3 MG) TAB at 18:03

## 2016-09-23 RX ADMIN — CLOBETASOL PROPIONATE PRN APPLIC: 0.5 CREAM TOPICAL at 09:49

## 2016-09-23 RX ADMIN — Medication SCH EACH: at 06:16

## 2016-09-23 RX ADMIN — Medication SCH MG: at 09:50

## 2016-09-23 RX ADMIN — Medication SCH UNIT: at 09:50

## 2016-09-24 RX ADMIN — Medication SCH EACH: at 08:08

## 2016-09-24 RX ADMIN — Medication SCH MG: at 08:09

## 2016-09-24 RX ADMIN — CLOBETASOL PROPIONATE PRN APPLIC: 0.5 CREAM TOPICAL at 08:05

## 2016-09-24 RX ADMIN — Medication SCH UNIT: at 08:09

## 2016-09-24 RX ADMIN — Medication SCH MG: at 20:23

## 2016-09-24 RX ADMIN — MAGNESIUM OXIDE TAB 400 MG (241.3 MG ELEMENTAL MG) SCH TAB: 400 (241.3 MG) TAB at 20:23

## 2016-09-24 RX ADMIN — Medication SCH: at 09:35

## 2016-09-24 RX ADMIN — ACETAMINOPHEN PRN MG: 650 TABLET, FILM COATED, EXTENDED RELEASE ORAL at 08:09

## 2016-09-24 RX ADMIN — Medication SCH EACH: at 20:23

## 2016-09-25 RX ADMIN — Medication SCH EACH: at 09:47

## 2016-09-25 RX ADMIN — Medication SCH MG: at 09:49

## 2016-09-25 RX ADMIN — Medication SCH MG: at 18:09

## 2016-09-25 RX ADMIN — ACETAMINOPHEN PRN MG: 650 TABLET, FILM COATED, EXTENDED RELEASE ORAL at 09:49

## 2016-09-25 RX ADMIN — MAGNESIUM OXIDE TAB 400 MG (241.3 MG ELEMENTAL MG) SCH TAB: 400 (241.3 MG) TAB at 18:09

## 2016-09-25 RX ADMIN — CLOBETASOL PROPIONATE PRN APPLIC: 0.5 CREAM TOPICAL at 10:04

## 2016-09-25 RX ADMIN — Medication SCH UNIT: at 09:48

## 2016-09-25 RX ADMIN — Medication SCH EACH: at 18:08

## 2016-09-25 RX ADMIN — Medication SCH EACH: at 18:09

## 2016-09-25 RX ADMIN — Medication SCH EACH: at 06:24

## 2016-09-26 RX ADMIN — ACETAMINOPHEN PRN MG: 650 TABLET, FILM COATED, EXTENDED RELEASE ORAL at 09:12

## 2016-09-26 RX ADMIN — Medication SCH MG: at 09:11

## 2016-09-26 RX ADMIN — Medication SCH MG: at 18:38

## 2016-09-26 RX ADMIN — Medication SCH EACH: at 09:09

## 2016-09-26 RX ADMIN — Medication SCH EACH: at 09:12

## 2016-09-26 RX ADMIN — Medication SCH EACH: at 06:10

## 2016-09-26 RX ADMIN — Medication SCH EACH: at 18:38

## 2016-09-26 RX ADMIN — MAGNESIUM OXIDE TAB 400 MG (241.3 MG ELEMENTAL MG) SCH TAB: 400 (241.3 MG) TAB at 18:38

## 2016-09-26 RX ADMIN — Medication SCH EACH: at 22:48

## 2016-09-26 RX ADMIN — Medication SCH MG: at 18:39

## 2016-09-26 RX ADMIN — Medication SCH UNIT: at 09:10

## 2016-09-26 RX ADMIN — Medication SCH EACH: at 18:37

## 2016-09-27 RX ADMIN — Medication SCH UNIT: at 09:42

## 2016-09-27 RX ADMIN — Medication SCH EACH: at 09:42

## 2016-09-27 RX ADMIN — Medication SCH MG: at 09:43

## 2016-09-27 RX ADMIN — Medication SCH EACH: at 06:12

## 2016-09-27 RX ADMIN — Medication SCH EACH: at 18:06

## 2016-09-27 RX ADMIN — Medication SCH EACH: at 18:07

## 2016-09-27 RX ADMIN — MAGNESIUM OXIDE TAB 400 MG (241.3 MG ELEMENTAL MG) SCH TAB: 400 (241.3 MG) TAB at 18:07

## 2016-09-27 RX ADMIN — Medication SCH MG: at 18:06

## 2016-09-27 RX ADMIN — Medication SCH MG: at 18:07

## 2016-09-28 RX ADMIN — Medication SCH EACH: at 20:44

## 2016-09-28 RX ADMIN — MAGNESIUM OXIDE TAB 400 MG (241.3 MG ELEMENTAL MG) SCH TAB: 400 (241.3 MG) TAB at 20:43

## 2016-09-28 RX ADMIN — Medication SCH UNIT: at 10:44

## 2016-09-28 RX ADMIN — ACETAMINOPHEN PRN MG: 650 TABLET, FILM COATED, EXTENDED RELEASE ORAL at 10:45

## 2016-09-28 RX ADMIN — Medication SCH MG: at 10:45

## 2016-09-28 RX ADMIN — Medication SCH EACH: at 06:21

## 2016-09-28 RX ADMIN — Medication SCH MG: at 20:44

## 2016-09-28 RX ADMIN — Medication SCH EACH: at 20:43

## 2016-09-28 RX ADMIN — Medication SCH EACH: at 10:43

## 2016-09-29 RX ADMIN — Medication SCH UNIT: at 09:29

## 2016-09-29 RX ADMIN — Medication SCH EACH: at 09:30

## 2016-09-29 RX ADMIN — Medication SCH: at 03:48

## 2016-09-29 RX ADMIN — Medication SCH EACH: at 09:29

## 2016-09-29 RX ADMIN — Medication SCH MG: at 18:07

## 2016-09-29 RX ADMIN — MAGNESIUM OXIDE TAB 400 MG (241.3 MG ELEMENTAL MG) SCH TAB: 400 (241.3 MG) TAB at 18:05

## 2016-09-29 RX ADMIN — Medication SCH EACH: at 06:19

## 2016-09-29 RX ADMIN — Medication SCH MG: at 09:29

## 2016-09-29 RX ADMIN — Medication SCH EACH: at 18:06

## 2016-09-29 RX ADMIN — ACETAMINOPHEN PRN MG: 650 TABLET, FILM COATED, EXTENDED RELEASE ORAL at 09:30

## 2016-09-29 RX ADMIN — Medication SCH MG: at 18:04

## 2016-09-29 RX ADMIN — Medication SCH EACH: at 18:05

## 2016-09-30 RX ADMIN — Medication SCH EACH: at 23:15

## 2016-09-30 RX ADMIN — Medication SCH UNIT: at 09:25

## 2016-09-30 RX ADMIN — ACETAMINOPHEN PRN MG: 650 TABLET, FILM COATED, EXTENDED RELEASE ORAL at 09:26

## 2016-09-30 RX ADMIN — Medication SCH EACH: at 09:25

## 2016-09-30 RX ADMIN — MAGNESIUM OXIDE TAB 400 MG (241.3 MG ELEMENTAL MG) SCH TAB: 400 (241.3 MG) TAB at 17:34

## 2016-09-30 RX ADMIN — Medication SCH EACH: at 17:33

## 2016-09-30 RX ADMIN — Medication SCH: at 18:04

## 2016-09-30 RX ADMIN — Medication SCH MG: at 17:34

## 2016-09-30 RX ADMIN — Medication SCH: at 18:03

## 2016-09-30 RX ADMIN — MAGNESIUM OXIDE TAB 400 MG (241.3 MG ELEMENTAL MG) SCH: 400 (241.3 MG) TAB at 18:04

## 2016-09-30 RX ADMIN — Medication SCH MG: at 09:25

## 2016-09-30 RX ADMIN — Medication SCH EACH: at 17:32

## 2016-09-30 RX ADMIN — Medication SCH EACH: at 06:26

## 2016-09-30 NOTE — PN
Progress Note for JENIFER SHEETS  Date:  09/04/2016  Room #:  VM.207

 

This is a progress note for the patient, who I did see for swing bed rounds on

09/04/2016, but apparently no dictation has been typed up or done.

 

SUBJECTIVE:  The patient is an 87-year-old on long-term swing bed due to

arthralgias requiring assistance with daily activities like getting dressed.

She has no concerns of pain.  Her bottom is feeling better.

 

OBJECTIVELY:  Vital Signs:  Her vitals on that date were, temperature 97.6,

pulse 62, blood pressure 119/62, respiratory rate 20, and O2 of 92% on room air.

General:  She was in no acute distress.

Heart:  Irregularly irregular.

Lungs:  Sounds were clear to auscultation bilaterally without crackles or

wheezes.

Extremities:  Warm and dry with no edema.

 

ASSESSMENT:

1. Atrial fibrillation with previous transient ischemic attack, on Pradaxa.

2. Mild thrombocytopenia.

3. Severe osteoarthritis.

4. Hypothyroidism.

5. Essential hypertension, controlled.

6. Atrophic vaginitis, doing well.

 

PLAN:  At this point, we will recertify her in 1 month to continue with swing

bed cares.  I have lab work ordered for [facility].

 

 

MKA:  09/30/2016 12:49:35  MODL:  09/30/2016 13:04:14

Job #:  [id number]

## 2016-10-01 RX ADMIN — Medication SCH EACH: at 09:34

## 2016-10-01 RX ADMIN — Medication SCH EACH: at 18:12

## 2016-10-01 RX ADMIN — Medication SCH MG: at 09:35

## 2016-10-01 RX ADMIN — ACETAMINOPHEN PRN MG: 650 TABLET, FILM COATED, EXTENDED RELEASE ORAL at 09:34

## 2016-10-01 RX ADMIN — MAGNESIUM OXIDE TAB 400 MG (241.3 MG ELEMENTAL MG) SCH TAB: 400 (241.3 MG) TAB at 18:12

## 2016-10-01 RX ADMIN — Medication SCH MG: at 18:13

## 2016-10-01 RX ADMIN — Medication SCH UNIT: at 09:35

## 2016-10-01 RX ADMIN — Medication SCH EACH: at 18:13

## 2016-10-02 RX ADMIN — Medication SCH MG: at 18:05

## 2016-10-02 RX ADMIN — Medication SCH EACH: at 18:05

## 2016-10-02 RX ADMIN — ACETAMINOPHEN PRN MG: 650 TABLET, FILM COATED, EXTENDED RELEASE ORAL at 09:28

## 2016-10-02 RX ADMIN — Medication SCH UNIT: at 09:27

## 2016-10-02 RX ADMIN — Medication SCH EACH: at 18:04

## 2016-10-02 RX ADMIN — Medication SCH EACH: at 06:10

## 2016-10-02 RX ADMIN — Medication SCH MG: at 09:27

## 2016-10-02 RX ADMIN — Medication SCH EACH: at 09:28

## 2016-10-02 RX ADMIN — MAGNESIUM OXIDE TAB 400 MG (241.3 MG ELEMENTAL MG) SCH TAB: 400 (241.3 MG) TAB at 18:05

## 2016-10-02 RX ADMIN — Medication SCH EACH: at 09:29

## 2016-10-03 RX ADMIN — Medication SCH MG: at 18:40

## 2016-10-03 RX ADMIN — Medication SCH EACH: at 06:06

## 2016-10-03 RX ADMIN — Medication SCH MG: at 10:45

## 2016-10-03 RX ADMIN — Medication SCH: at 23:49

## 2016-10-03 RX ADMIN — Medication SCH UNIT: at 10:45

## 2016-10-03 RX ADMIN — MAGNESIUM OXIDE TAB 400 MG (241.3 MG ELEMENTAL MG) SCH TAB: 400 (241.3 MG) TAB at 18:40

## 2016-10-03 RX ADMIN — Medication SCH EACH: at 18:40

## 2016-10-03 RX ADMIN — Medication SCH EACH: at 10:44

## 2016-10-03 RX ADMIN — ACETAMINOPHEN PRN MG: 650 TABLET, FILM COATED, EXTENDED RELEASE ORAL at 10:45

## 2016-10-03 NOTE — PN
Progress Note for JENIFER SHEETS  Date:  10/02/2016  Room #:  VM.207

 

SUBJECTIVE:  This is a swing bed progress note on an 87-year-old on long-term

swing bed due to impaired mobility due to osteoarthritis.

 

The patient's moods have been quite good.  She is watching golf today.  She has

no further bladder or vaginal concerns.  She has not had any chest pain or

trouble breathing.

 

OBJECTIVE:  Vital Signs:  Her temperature is 97.9, pulse 74, blood pressure

127/53, respiratory rate 20, and O2 of 95% on room air.

General:  She is in no acute distress.

Heart:  Irregularly irregular.

Respiratory:  Lungs sounds are clear to auscultation bilaterally without

crackles or wheezes.

Extremities:  Warm and dry.  No edema.

 

ASSESSMENT:

1. Atrial fibrillation with previous transient ischemic attack, on Pradaxa.

2. Mild thrombocytopenia.

3. Severe osteoarthritis.

4. Hypothyroidism.

5. Essential hypertension.

6. History of atrophic vaginitis, doing well.

 

PLAN:  At this point, the patient will continue with non-skilled swing bed

cares, long-term swing bed.  She is doing quite well.  We have lab work plan for

Kyle.

 

 

MKA:  10/02/2016 16:09:14  MODL:  10/02/2016 16:30:59

Job #:  [id number]

## 2016-10-04 RX ADMIN — Medication SCH MG: at 18:40

## 2016-10-04 RX ADMIN — CLOBETASOL PROPIONATE PRN APPLIC: 0.5 CREAM TOPICAL at 22:18

## 2016-10-04 RX ADMIN — MAGNESIUM OXIDE TAB 400 MG (241.3 MG ELEMENTAL MG) SCH TAB: 400 (241.3 MG) TAB at 18:40

## 2016-10-04 RX ADMIN — ACETAMINOPHEN PRN MG: 650 TABLET, FILM COATED, EXTENDED RELEASE ORAL at 10:58

## 2016-10-04 RX ADMIN — Medication SCH EACH: at 18:40

## 2016-10-04 RX ADMIN — Medication SCH EACH: at 06:02

## 2016-10-04 RX ADMIN — Medication SCH EACH: at 18:39

## 2016-10-04 RX ADMIN — Medication SCH MG: at 10:59

## 2016-10-04 RX ADMIN — Medication SCH UNIT: at 10:59

## 2016-10-04 RX ADMIN — Medication SCH EACH: at 10:59

## 2016-10-05 RX ADMIN — Medication SCH: at 23:20

## 2016-10-05 RX ADMIN — Medication SCH EACH: at 09:41

## 2016-10-05 RX ADMIN — Medication SCH EACH: at 18:27

## 2016-10-05 RX ADMIN — Medication SCH EACH: at 06:16

## 2016-10-05 RX ADMIN — Medication SCH EACH: at 18:26

## 2016-10-05 RX ADMIN — Medication SCH MG: at 09:41

## 2016-10-05 RX ADMIN — MAGNESIUM OXIDE TAB 400 MG (241.3 MG ELEMENTAL MG) SCH TAB: 400 (241.3 MG) TAB at 18:25

## 2016-10-05 RX ADMIN — ACETAMINOPHEN PRN MG: 650 TABLET, FILM COATED, EXTENDED RELEASE ORAL at 09:40

## 2016-10-05 RX ADMIN — Medication SCH MG: at 18:25

## 2016-10-05 RX ADMIN — Medication SCH UNIT: at 09:41

## 2016-10-06 RX ADMIN — Medication SCH EACH: at 06:31

## 2016-10-06 RX ADMIN — Medication SCH MG: at 09:39

## 2016-10-06 RX ADMIN — Medication SCH MG: at 17:59

## 2016-10-06 RX ADMIN — ACETAMINOPHEN PRN MG: 650 TABLET, FILM COATED, EXTENDED RELEASE ORAL at 09:38

## 2016-10-06 RX ADMIN — MAGNESIUM OXIDE TAB 400 MG (241.3 MG ELEMENTAL MG) SCH TAB: 400 (241.3 MG) TAB at 17:59

## 2016-10-06 RX ADMIN — Medication SCH EACH: at 09:39

## 2016-10-06 RX ADMIN — Medication SCH EACH: at 17:59

## 2016-10-06 RX ADMIN — Medication SCH UNIT: at 09:39

## 2016-10-07 RX ADMIN — Medication SCH EACH: at 21:00

## 2016-10-07 RX ADMIN — Medication SCH EACH: at 06:09

## 2016-10-07 RX ADMIN — Medication SCH EACH: at 09:57

## 2016-10-07 RX ADMIN — MAGNESIUM OXIDE TAB 400 MG (241.3 MG ELEMENTAL MG) SCH TAB: 400 (241.3 MG) TAB at 21:01

## 2016-10-07 RX ADMIN — Medication SCH UNIT: at 09:53

## 2016-10-07 RX ADMIN — ACETAMINOPHEN PRN MG: 650 TABLET, FILM COATED, EXTENDED RELEASE ORAL at 09:58

## 2016-10-07 RX ADMIN — Medication SCH MG: at 21:01

## 2016-10-07 RX ADMIN — Medication SCH MG: at 09:54

## 2016-10-07 RX ADMIN — Medication SCH EACH: at 21:01

## 2016-10-08 RX ADMIN — Medication SCH MG: at 10:08

## 2016-10-08 RX ADMIN — Medication SCH EACH: at 10:06

## 2016-10-08 RX ADMIN — Medication SCH UNIT: at 10:06

## 2016-10-08 RX ADMIN — MAGNESIUM OXIDE TAB 400 MG (241.3 MG ELEMENTAL MG) SCH TAB: 400 (241.3 MG) TAB at 18:06

## 2016-10-08 RX ADMIN — Medication SCH EACH: at 18:05

## 2016-10-08 RX ADMIN — Medication SCH MG: at 18:06

## 2016-10-08 RX ADMIN — Medication SCH EACH: at 10:01

## 2016-10-08 RX ADMIN — ACETAMINOPHEN PRN MG: 650 TABLET, FILM COATED, EXTENDED RELEASE ORAL at 10:11

## 2016-10-09 RX ADMIN — Medication SCH MG: at 19:32

## 2016-10-09 RX ADMIN — Medication SCH EACH: at 06:26

## 2016-10-09 RX ADMIN — Medication SCH MG: at 10:05

## 2016-10-09 RX ADMIN — Medication SCH UNIT: at 10:04

## 2016-10-09 RX ADMIN — Medication SCH EACH: at 10:06

## 2016-10-09 RX ADMIN — MAGNESIUM OXIDE TAB 400 MG (241.3 MG ELEMENTAL MG) SCH TAB: 400 (241.3 MG) TAB at 19:32

## 2016-10-09 RX ADMIN — Medication SCH EACH: at 19:31

## 2016-10-09 RX ADMIN — ACETAMINOPHEN PRN MG: 650 TABLET, FILM COATED, EXTENDED RELEASE ORAL at 10:06

## 2016-10-10 RX ADMIN — Medication SCH EACH: at 06:24

## 2016-10-10 RX ADMIN — Medication SCH MG: at 09:36

## 2016-10-10 RX ADMIN — Medication SCH EACH: at 23:10

## 2016-10-10 RX ADMIN — Medication SCH MG: at 18:16

## 2016-10-10 RX ADMIN — Medication SCH UNIT: at 09:36

## 2016-10-10 RX ADMIN — Medication SCH EACH: at 09:36

## 2016-10-10 RX ADMIN — Medication SCH EACH: at 18:16

## 2016-10-10 RX ADMIN — ACETAMINOPHEN PRN MG: 650 TABLET, FILM COATED, EXTENDED RELEASE ORAL at 09:37

## 2016-10-10 RX ADMIN — MAGNESIUM OXIDE TAB 400 MG (241.3 MG ELEMENTAL MG) SCH TAB: 400 (241.3 MG) TAB at 18:16

## 2016-10-11 RX ADMIN — Medication SCH EACH: at 06:15

## 2016-10-11 RX ADMIN — Medication SCH EACH: at 10:48

## 2016-10-11 RX ADMIN — Medication SCH EACH: at 20:16

## 2016-10-11 RX ADMIN — MAGNESIUM OXIDE TAB 400 MG (241.3 MG ELEMENTAL MG) SCH TAB: 400 (241.3 MG) TAB at 20:16

## 2016-10-11 RX ADMIN — Medication SCH UNIT: at 10:46

## 2016-10-11 RX ADMIN — Medication SCH MG: at 20:17

## 2016-10-11 RX ADMIN — ACETAMINOPHEN PRN MG: 650 TABLET, FILM COATED, EXTENDED RELEASE ORAL at 10:49

## 2016-10-11 RX ADMIN — Medication SCH EACH: at 20:15

## 2016-10-11 RX ADMIN — Medication SCH MG: at 10:47

## 2016-10-11 RX ADMIN — Medication SCH EACH: at 19:11

## 2016-10-11 RX ADMIN — Medication SCH MG: at 20:16

## 2016-10-12 RX ADMIN — Medication SCH EACH: at 06:21

## 2016-10-12 RX ADMIN — Medication SCH UNIT: at 10:01

## 2016-10-12 RX ADMIN — ACETAMINOPHEN PRN MG: 650 TABLET, FILM COATED, EXTENDED RELEASE ORAL at 10:00

## 2016-10-12 RX ADMIN — Medication SCH EACH: at 10:00

## 2016-10-12 RX ADMIN — Medication SCH: at 23:08

## 2016-10-12 RX ADMIN — Medication SCH MG: at 10:01

## 2016-10-12 RX ADMIN — Medication SCH MG: at 18:12

## 2016-10-12 RX ADMIN — MAGNESIUM OXIDE TAB 400 MG (241.3 MG ELEMENTAL MG) SCH TAB: 400 (241.3 MG) TAB at 18:12

## 2016-10-12 RX ADMIN — Medication SCH EACH: at 18:12

## 2016-10-13 RX ADMIN — MAGNESIUM OXIDE TAB 400 MG (241.3 MG ELEMENTAL MG) SCH TAB: 400 (241.3 MG) TAB at 18:27

## 2016-10-13 RX ADMIN — Medication SCH MG: at 18:26

## 2016-10-13 RX ADMIN — Medication SCH UNIT: at 09:43

## 2016-10-13 RX ADMIN — Medication SCH MG: at 09:43

## 2016-10-13 RX ADMIN — Medication SCH EACH: at 06:39

## 2016-10-13 RX ADMIN — Medication SCH EACH: at 18:27

## 2016-10-13 RX ADMIN — Medication SCH EACH: at 09:42

## 2016-10-13 RX ADMIN — Medication SCH MG: at 18:27

## 2016-10-13 RX ADMIN — ACETAMINOPHEN PRN MG: 650 TABLET, FILM COATED, EXTENDED RELEASE ORAL at 09:43

## 2016-10-14 RX ADMIN — ACETAMINOPHEN PRN MG: 650 TABLET, FILM COATED, EXTENDED RELEASE ORAL at 09:46

## 2016-10-14 RX ADMIN — Medication SCH MG: at 21:12

## 2016-10-14 RX ADMIN — Medication SCH EACH: at 21:13

## 2016-10-14 RX ADMIN — Medication SCH EACH: at 09:46

## 2016-10-14 RX ADMIN — MAGNESIUM OXIDE TAB 400 MG (241.3 MG ELEMENTAL MG) SCH TAB: 400 (241.3 MG) TAB at 21:10

## 2016-10-14 RX ADMIN — Medication SCH EACH: at 09:44

## 2016-10-14 RX ADMIN — Medication SCH UNIT: at 09:45

## 2016-10-14 RX ADMIN — Medication SCH MG: at 21:11

## 2016-10-14 RX ADMIN — Medication SCH EACH: at 21:10

## 2016-10-14 RX ADMIN — Medication SCH MG: at 09:45

## 2016-10-14 RX ADMIN — Medication SCH EACH: at 06:05

## 2016-10-15 RX ADMIN — ACETAMINOPHEN PRN MG: 650 TABLET, FILM COATED, EXTENDED RELEASE ORAL at 09:53

## 2016-10-15 RX ADMIN — Medication SCH UNIT: at 09:51

## 2016-10-15 RX ADMIN — Medication SCH MG: at 17:59

## 2016-10-15 RX ADMIN — MAGNESIUM OXIDE TAB 400 MG (241.3 MG ELEMENTAL MG) SCH TAB: 400 (241.3 MG) TAB at 17:59

## 2016-10-15 RX ADMIN — Medication SCH EACH: at 01:05

## 2016-10-15 RX ADMIN — Medication SCH MG: at 09:52

## 2016-10-15 RX ADMIN — Medication SCH EACH: at 17:59

## 2016-10-15 RX ADMIN — Medication SCH EACH: at 09:51

## 2016-10-16 RX ADMIN — ACETAMINOPHEN PRN MG: 650 TABLET, FILM COATED, EXTENDED RELEASE ORAL at 09:01

## 2016-10-16 RX ADMIN — Medication SCH MG: at 17:59

## 2016-10-16 RX ADMIN — Medication SCH EACH: at 09:00

## 2016-10-16 RX ADMIN — MAGNESIUM OXIDE TAB 400 MG (241.3 MG ELEMENTAL MG) SCH TAB: 400 (241.3 MG) TAB at 17:59

## 2016-10-16 RX ADMIN — Medication SCH EACH: at 17:59

## 2016-10-16 RX ADMIN — Medication SCH EACH: at 06:52

## 2016-10-16 RX ADMIN — Medication SCH UNIT: at 09:00

## 2016-10-16 RX ADMIN — Medication SCH MG: at 09:00

## 2016-10-17 RX ADMIN — ACETAMINOPHEN PRN MG: 650 TABLET, FILM COATED, EXTENDED RELEASE ORAL at 09:49

## 2016-10-17 RX ADMIN — Medication SCH UNIT: at 09:48

## 2016-10-17 RX ADMIN — Medication SCH EACH: at 09:48

## 2016-10-17 RX ADMIN — Medication SCH MG: at 18:18

## 2016-10-17 RX ADMIN — Medication SCH EACH: at 09:49

## 2016-10-17 RX ADMIN — Medication SCH EACH: at 06:01

## 2016-10-17 RX ADMIN — Medication SCH MG: at 09:48

## 2016-10-17 RX ADMIN — Medication SCH EACH: at 18:18

## 2016-10-17 RX ADMIN — Medication SCH EACH: at 18:19

## 2016-10-17 RX ADMIN — MAGNESIUM OXIDE TAB 400 MG (241.3 MG ELEMENTAL MG) SCH TAB: 400 (241.3 MG) TAB at 18:18

## 2016-10-18 RX ADMIN — MAGNESIUM OXIDE TAB 400 MG (241.3 MG ELEMENTAL MG) SCH TAB: 400 (241.3 MG) TAB at 18:03

## 2016-10-18 RX ADMIN — Medication SCH: at 06:13

## 2016-10-18 RX ADMIN — Medication SCH EACH: at 06:13

## 2016-10-18 RX ADMIN — Medication SCH UNIT: at 10:14

## 2016-10-18 RX ADMIN — ACETAMINOPHEN PRN MG: 650 TABLET, FILM COATED, EXTENDED RELEASE ORAL at 10:16

## 2016-10-18 RX ADMIN — Medication SCH MG: at 18:03

## 2016-10-18 RX ADMIN — Medication SCH EACH: at 10:14

## 2016-10-18 RX ADMIN — Medication SCH MG: at 10:15

## 2016-10-18 RX ADMIN — Medication SCH EACH: at 18:02

## 2016-10-19 RX ADMIN — Medication SCH: at 22:48

## 2016-10-19 RX ADMIN — Medication SCH EACH: at 09:27

## 2016-10-19 RX ADMIN — MAGNESIUM OXIDE TAB 400 MG (241.3 MG ELEMENTAL MG) SCH TAB: 400 (241.3 MG) TAB at 18:26

## 2016-10-19 RX ADMIN — ACETAMINOPHEN PRN MG: 650 TABLET, FILM COATED, EXTENDED RELEASE ORAL at 09:27

## 2016-10-19 RX ADMIN — Medication SCH MG: at 09:26

## 2016-10-19 RX ADMIN — Medication SCH UNIT: at 09:27

## 2016-10-19 RX ADMIN — Medication SCH MG: at 18:26

## 2016-10-19 RX ADMIN — Medication SCH EACH: at 18:26

## 2016-10-19 RX ADMIN — Medication SCH EACH: at 05:59

## 2016-10-20 RX ADMIN — Medication SCH MG: at 09:31

## 2016-10-20 RX ADMIN — Medication SCH MG: at 19:48

## 2016-10-20 RX ADMIN — MAGNESIUM OXIDE TAB 400 MG (241.3 MG ELEMENTAL MG) SCH TAB: 400 (241.3 MG) TAB at 19:48

## 2016-10-20 RX ADMIN — Medication SCH UNIT: at 09:31

## 2016-10-20 RX ADMIN — Medication SCH EACH: at 09:34

## 2016-10-20 RX ADMIN — ACETAMINOPHEN PRN MG: 650 TABLET, FILM COATED, EXTENDED RELEASE ORAL at 09:36

## 2016-10-20 RX ADMIN — Medication SCH EACH: at 09:32

## 2016-10-20 RX ADMIN — Medication SCH EACH: at 19:49

## 2016-10-20 RX ADMIN — Medication SCH EACH: at 06:08

## 2016-10-20 RX ADMIN — Medication SCH EACH: at 19:48

## 2016-10-21 RX ADMIN — ACETAMINOPHEN PRN MG: 650 TABLET, FILM COATED, EXTENDED RELEASE ORAL at 10:34

## 2016-10-21 RX ADMIN — Medication SCH MG: at 18:02

## 2016-10-21 RX ADMIN — MAGNESIUM OXIDE TAB 400 MG (241.3 MG ELEMENTAL MG) SCH TAB: 400 (241.3 MG) TAB at 18:02

## 2016-10-21 RX ADMIN — Medication SCH EACH: at 18:01

## 2016-10-21 RX ADMIN — Medication SCH MG: at 10:33

## 2016-10-21 RX ADMIN — Medication SCH UNIT: at 10:33

## 2016-10-21 RX ADMIN — Medication SCH MG: at 18:01

## 2016-10-21 RX ADMIN — Medication SCH EACH: at 18:00

## 2016-10-21 RX ADMIN — Medication SCH EACH: at 10:34

## 2016-10-21 RX ADMIN — Medication SCH EACH: at 22:17

## 2016-10-21 RX ADMIN — Medication SCH EACH: at 06:06

## 2016-10-22 RX ADMIN — MAGNESIUM OXIDE TAB 400 MG (241.3 MG ELEMENTAL MG) SCH TAB: 400 (241.3 MG) TAB at 18:17

## 2016-10-22 RX ADMIN — Medication SCH UNIT: at 09:24

## 2016-10-22 RX ADMIN — Medication SCH MG: at 09:24

## 2016-10-22 RX ADMIN — Medication SCH MG: at 18:17

## 2016-10-22 RX ADMIN — Medication SCH EACH: at 18:17

## 2016-10-22 RX ADMIN — ACETAMINOPHEN PRN MG: 650 TABLET, FILM COATED, EXTENDED RELEASE ORAL at 17:12

## 2016-10-22 RX ADMIN — Medication SCH EACH: at 09:24

## 2016-10-22 RX ADMIN — ACETAMINOPHEN PRN MG: 650 TABLET, FILM COATED, EXTENDED RELEASE ORAL at 09:25

## 2016-10-23 RX ADMIN — ACETAMINOPHEN PRN MG: 650 TABLET, FILM COATED, EXTENDED RELEASE ORAL at 18:00

## 2016-10-23 RX ADMIN — Medication SCH UNIT: at 09:58

## 2016-10-23 RX ADMIN — Medication SCH EACH: at 09:58

## 2016-10-23 RX ADMIN — ACETAMINOPHEN PRN MG: 650 TABLET, FILM COATED, EXTENDED RELEASE ORAL at 09:58

## 2016-10-23 RX ADMIN — Medication SCH MG: at 18:00

## 2016-10-23 RX ADMIN — Medication SCH MG: at 09:58

## 2016-10-23 RX ADMIN — Medication SCH EACH: at 10:30

## 2016-10-23 RX ADMIN — Medication SCH EACH: at 18:00

## 2016-10-23 RX ADMIN — MAGNESIUM OXIDE TAB 400 MG (241.3 MG ELEMENTAL MG) SCH TAB: 400 (241.3 MG) TAB at 18:00

## 2016-10-24 RX ADMIN — Medication SCH MG: at 18:17

## 2016-10-24 RX ADMIN — Medication SCH: at 23:26

## 2016-10-24 RX ADMIN — Medication SCH MG: at 18:19

## 2016-10-24 RX ADMIN — Medication SCH EACH: at 18:18

## 2016-10-24 RX ADMIN — MAGNESIUM OXIDE TAB 400 MG (241.3 MG ELEMENTAL MG) SCH TAB: 400 (241.3 MG) TAB at 18:17

## 2016-10-24 RX ADMIN — Medication SCH EACH: at 11:00

## 2016-10-24 RX ADMIN — ACETAMINOPHEN PRN MG: 650 TABLET, FILM COATED, EXTENDED RELEASE ORAL at 11:00

## 2016-10-24 RX ADMIN — Medication SCH UNIT: at 10:59

## 2016-10-24 RX ADMIN — Medication SCH MG: at 10:59

## 2016-10-24 RX ADMIN — Medication SCH EACH: at 06:05

## 2016-10-25 RX ADMIN — Medication SCH MG: at 09:56

## 2016-10-25 RX ADMIN — Medication SCH UNIT: at 09:56

## 2016-10-25 RX ADMIN — ACETAMINOPHEN PRN MG: 650 TABLET, FILM COATED, EXTENDED RELEASE ORAL at 09:56

## 2016-10-25 RX ADMIN — Medication SCH EACH: at 06:12

## 2016-10-25 RX ADMIN — MAGNESIUM OXIDE TAB 400 MG (241.3 MG ELEMENTAL MG) SCH TAB: 400 (241.3 MG) TAB at 18:21

## 2016-10-25 RX ADMIN — Medication SCH MG: at 18:21

## 2016-10-25 RX ADMIN — Medication SCH EACH: at 18:21

## 2016-10-25 RX ADMIN — Medication SCH EACH: at 09:56

## 2016-10-26 RX ADMIN — MAGNESIUM OXIDE TAB 400 MG (241.3 MG ELEMENTAL MG) SCH TAB: 400 (241.3 MG) TAB at 18:02

## 2016-10-26 RX ADMIN — Medication SCH MG: at 18:03

## 2016-10-26 RX ADMIN — Medication SCH MG: at 09:53

## 2016-10-26 RX ADMIN — Medication SCH EACH: at 09:55

## 2016-10-26 RX ADMIN — Medication SCH UNIT: at 09:53

## 2016-10-26 RX ADMIN — Medication SCH MG: at 18:02

## 2016-10-26 RX ADMIN — Medication SCH EACH: at 06:10

## 2016-10-26 RX ADMIN — Medication SCH EACH: at 18:02

## 2016-10-26 RX ADMIN — Medication SCH EACH: at 09:53

## 2016-10-26 RX ADMIN — Medication SCH EACH: at 18:03

## 2016-10-27 RX ADMIN — Medication SCH EACH: at 09:15

## 2016-10-27 RX ADMIN — Medication SCH UNIT: at 09:15

## 2016-10-27 RX ADMIN — Medication SCH EACH: at 06:38

## 2016-10-27 RX ADMIN — MAGNESIUM OXIDE TAB 400 MG (241.3 MG ELEMENTAL MG) SCH TAB: 400 (241.3 MG) TAB at 18:05

## 2016-10-27 RX ADMIN — ACETAMINOPHEN PRN MG: 650 TABLET, FILM COATED, EXTENDED RELEASE ORAL at 09:15

## 2016-10-27 RX ADMIN — Medication SCH MG: at 18:05

## 2016-10-27 RX ADMIN — Medication SCH MG: at 09:15

## 2016-10-27 RX ADMIN — Medication SCH: at 06:19

## 2016-10-27 RX ADMIN — Medication SCH EACH: at 18:04

## 2016-10-28 RX ADMIN — MAGNESIUM OXIDE TAB 400 MG (241.3 MG ELEMENTAL MG) SCH TAB: 400 (241.3 MG) TAB at 18:38

## 2016-10-28 RX ADMIN — Medication SCH MG: at 18:37

## 2016-10-28 RX ADMIN — Medication SCH EACH: at 06:08

## 2016-10-28 RX ADMIN — ACETAMINOPHEN PRN MG: 650 TABLET, FILM COATED, EXTENDED RELEASE ORAL at 11:02

## 2016-10-28 RX ADMIN — Medication SCH EACH: at 11:01

## 2016-10-28 RX ADMIN — Medication SCH MG: at 18:38

## 2016-10-28 RX ADMIN — Medication SCH EACH: at 18:38

## 2016-10-28 RX ADMIN — Medication SCH EACH: at 18:37

## 2016-10-28 RX ADMIN — Medication SCH MG: at 11:02

## 2016-10-28 RX ADMIN — Medication SCH UNIT: at 11:02

## 2016-10-29 RX ADMIN — Medication SCH UNIT: at 09:34

## 2016-10-29 RX ADMIN — MAGNESIUM OXIDE TAB 400 MG (241.3 MG ELEMENTAL MG) SCH TAB: 400 (241.3 MG) TAB at 18:08

## 2016-10-29 RX ADMIN — Medication SCH MG: at 09:35

## 2016-10-29 RX ADMIN — Medication SCH EACH: at 09:34

## 2016-10-29 RX ADMIN — Medication SCH MG: at 18:09

## 2016-10-29 RX ADMIN — ACETAMINOPHEN PRN MG: 650 TABLET, FILM COATED, EXTENDED RELEASE ORAL at 09:33

## 2016-10-29 RX ADMIN — Medication SCH EACH: at 18:09

## 2016-10-29 RX ADMIN — Medication SCH EACH: at 09:33

## 2016-10-29 RX ADMIN — Medication SCH EACH: at 18:08

## 2016-10-29 RX ADMIN — Medication SCH: at 05:53

## 2016-10-30 RX ADMIN — ACETAMINOPHEN PRN MG: 650 TABLET, FILM COATED, EXTENDED RELEASE ORAL at 10:16

## 2016-10-30 RX ADMIN — Medication SCH UNIT: at 10:14

## 2016-10-30 RX ADMIN — Medication SCH MG: at 18:22

## 2016-10-30 RX ADMIN — Medication SCH EACH: at 18:21

## 2016-10-30 RX ADMIN — Medication SCH EACH: at 06:00

## 2016-10-30 RX ADMIN — Medication SCH EACH: at 10:15

## 2016-10-30 RX ADMIN — Medication SCH MG: at 10:15

## 2016-10-30 RX ADMIN — MAGNESIUM OXIDE TAB 400 MG (241.3 MG ELEMENTAL MG) SCH TAB: 400 (241.3 MG) TAB at 18:22

## 2016-10-31 LAB
CHLORIDE SERPL-SCNC: 105 MMOL/L (ref 98–107)
SODIUM SERPL-SCNC: 141 MMOL/L (ref 136–145)

## 2016-10-31 RX ADMIN — Medication SCH MG: at 19:20

## 2016-10-31 RX ADMIN — Medication SCH EACH: at 06:16

## 2016-10-31 RX ADMIN — Medication SCH: at 23:34

## 2016-10-31 RX ADMIN — CLOBETASOL PROPIONATE PRN APPLIC: 0.5 CREAM TOPICAL at 10:45

## 2016-10-31 RX ADMIN — Medication SCH UNIT: at 10:48

## 2016-10-31 RX ADMIN — Medication SCH EACH: at 19:20

## 2016-10-31 RX ADMIN — Medication SCH EACH: at 19:19

## 2016-10-31 RX ADMIN — ACETAMINOPHEN PRN MG: 650 TABLET, FILM COATED, EXTENDED RELEASE ORAL at 10:50

## 2016-10-31 RX ADMIN — Medication SCH EACH: at 10:47

## 2016-10-31 RX ADMIN — Medication SCH MG: at 10:48

## 2016-10-31 RX ADMIN — MAGNESIUM OXIDE TAB 400 MG (241.3 MG ELEMENTAL MG) SCH TAB: 400 (241.3 MG) TAB at 19:20

## 2016-11-01 RX ADMIN — Medication SCH UNIT: at 09:40

## 2016-11-01 RX ADMIN — Medication SCH EACH: at 06:00

## 2016-11-01 RX ADMIN — Medication SCH MG: at 17:59

## 2016-11-01 RX ADMIN — Medication SCH EACH: at 17:59

## 2016-11-01 RX ADMIN — Medication SCH MG: at 09:40

## 2016-11-01 RX ADMIN — Medication SCH EACH: at 18:00

## 2016-11-01 RX ADMIN — Medication SCH MG: at 18:00

## 2016-11-01 RX ADMIN — Medication SCH EACH: at 09:42

## 2016-11-01 RX ADMIN — MAGNESIUM OXIDE TAB 400 MG (241.3 MG ELEMENTAL MG) SCH TAB: 400 (241.3 MG) TAB at 17:59

## 2016-11-01 RX ADMIN — ACETAMINOPHEN PRN MG: 650 TABLET, FILM COATED, EXTENDED RELEASE ORAL at 09:41

## 2016-11-01 RX ADMIN — Medication SCH EACH: at 09:41

## 2016-11-02 RX ADMIN — Medication SCH EACH: at 22:44

## 2016-11-02 RX ADMIN — Medication SCH EACH: at 10:23

## 2016-11-02 RX ADMIN — Medication SCH: at 06:12

## 2016-11-02 RX ADMIN — Medication SCH EACH: at 18:16

## 2016-11-02 RX ADMIN — ACETAMINOPHEN PRN MG: 650 TABLET, FILM COATED, EXTENDED RELEASE ORAL at 10:23

## 2016-11-02 RX ADMIN — Medication SCH EACH: at 04:25

## 2016-11-02 RX ADMIN — Medication SCH MG: at 18:17

## 2016-11-02 RX ADMIN — Medication SCH UNIT: at 10:22

## 2016-11-02 RX ADMIN — Medication SCH MG: at 10:22

## 2016-11-02 RX ADMIN — Medication SCH MG: at 18:16

## 2016-11-02 RX ADMIN — ACETAMINOPHEN PRN MG: 650 TABLET, FILM COATED, EXTENDED RELEASE ORAL at 20:19

## 2016-11-02 RX ADMIN — MAGNESIUM OXIDE TAB 400 MG (241.3 MG ELEMENTAL MG) SCH TAB: 400 (241.3 MG) TAB at 18:16

## 2016-11-02 RX ADMIN — Medication SCH EACH: at 18:17

## 2016-11-03 RX ADMIN — Medication SCH MG: at 20:29

## 2016-11-03 RX ADMIN — Medication SCH UNIT: at 10:39

## 2016-11-03 RX ADMIN — Medication SCH EACH: at 20:32

## 2016-11-03 RX ADMIN — Medication SCH EACH: at 06:01

## 2016-11-03 RX ADMIN — Medication SCH EACH: at 20:33

## 2016-11-03 RX ADMIN — Medication SCH MG: at 20:32

## 2016-11-03 RX ADMIN — MAGNESIUM OXIDE TAB 400 MG (241.3 MG ELEMENTAL MG) SCH TAB: 400 (241.3 MG) TAB at 20:30

## 2016-11-03 RX ADMIN — Medication SCH EACH: at 10:38

## 2016-11-03 RX ADMIN — Medication SCH MG: at 10:39

## 2016-11-03 RX ADMIN — ACETAMINOPHEN PRN MG: 650 TABLET, FILM COATED, EXTENDED RELEASE ORAL at 10:38

## 2016-11-04 RX ADMIN — Medication SCH MG: at 18:05

## 2016-11-04 RX ADMIN — MAGNESIUM OXIDE TAB 400 MG (241.3 MG ELEMENTAL MG) SCH TAB: 400 (241.3 MG) TAB at 18:06

## 2016-11-04 RX ADMIN — Medication SCH EACH: at 23:12

## 2016-11-04 RX ADMIN — Medication SCH EACH: at 10:13

## 2016-11-04 RX ADMIN — Medication SCH EACH: at 18:04

## 2016-11-04 RX ADMIN — Medication SCH EACH: at 06:17

## 2016-11-04 RX ADMIN — Medication SCH MG: at 18:06

## 2016-11-04 RX ADMIN — Medication SCH MG: at 10:12

## 2016-11-04 RX ADMIN — Medication SCH EACH: at 18:06

## 2016-11-04 RX ADMIN — Medication SCH UNIT: at 10:12

## 2016-11-05 RX ADMIN — Medication SCH EACH: at 18:20

## 2016-11-05 RX ADMIN — Medication SCH MG: at 18:20

## 2016-11-05 RX ADMIN — Medication SCH EACH: at 18:21

## 2016-11-05 RX ADMIN — Medication SCH MG: at 18:21

## 2016-11-05 RX ADMIN — Medication SCH UNIT: at 09:55

## 2016-11-05 RX ADMIN — MAGNESIUM OXIDE TAB 400 MG (241.3 MG ELEMENTAL MG) SCH TAB: 400 (241.3 MG) TAB at 18:20

## 2016-11-05 RX ADMIN — Medication SCH MG: at 09:54

## 2016-11-05 RX ADMIN — Medication SCH EACH: at 09:55

## 2016-11-06 RX ADMIN — MAGNESIUM OXIDE TAB 400 MG (241.3 MG ELEMENTAL MG) SCH TAB: 400 (241.3 MG) TAB at 18:19

## 2016-11-06 RX ADMIN — Medication SCH EACH: at 18:18

## 2016-11-06 RX ADMIN — Medication SCH EACH: at 06:12

## 2016-11-06 RX ADMIN — Medication SCH MG: at 09:50

## 2016-11-06 RX ADMIN — Medication SCH UNIT: at 09:49

## 2016-11-06 RX ADMIN — Medication SCH EACH: at 09:49

## 2016-11-06 RX ADMIN — Medication SCH EACH: at 18:20

## 2016-11-06 RX ADMIN — Medication SCH MG: at 18:19

## 2016-11-07 RX ADMIN — Medication SCH MG: at 10:12

## 2016-11-07 RX ADMIN — MAGNESIUM OXIDE TAB 400 MG (241.3 MG ELEMENTAL MG) SCH TAB: 400 (241.3 MG) TAB at 18:17

## 2016-11-07 RX ADMIN — Medication SCH: at 22:29

## 2016-11-07 RX ADMIN — Medication SCH EACH: at 10:13

## 2016-11-07 RX ADMIN — Medication SCH MG: at 18:17

## 2016-11-07 RX ADMIN — Medication SCH EACH: at 18:17

## 2016-11-07 RX ADMIN — Medication SCH UNIT: at 10:12

## 2016-11-07 RX ADMIN — ACETAMINOPHEN PRN MG: 650 TABLET, FILM COATED, EXTENDED RELEASE ORAL at 10:13

## 2016-11-07 RX ADMIN — Medication SCH EACH: at 08:30

## 2016-11-08 RX ADMIN — Medication SCH EACH: at 02:22

## 2016-11-08 RX ADMIN — CLOBETASOL PROPIONATE PRN APPLIC: 0.5 CREAM TOPICAL at 09:33

## 2016-11-08 RX ADMIN — Medication SCH MG: at 18:04

## 2016-11-08 RX ADMIN — Medication SCH EACH: at 06:11

## 2016-11-08 RX ADMIN — Medication SCH EACH: at 23:02

## 2016-11-08 RX ADMIN — CLOBETASOL PROPIONATE PRN APPLIC: 0.5 CREAM TOPICAL at 23:06

## 2016-11-08 RX ADMIN — Medication SCH MG: at 18:05

## 2016-11-08 RX ADMIN — MAGNESIUM OXIDE TAB 400 MG (241.3 MG ELEMENTAL MG) SCH TAB: 400 (241.3 MG) TAB at 18:04

## 2016-11-08 RX ADMIN — ACETAMINOPHEN PRN MG: 650 TABLET, FILM COATED, EXTENDED RELEASE ORAL at 09:30

## 2016-11-08 RX ADMIN — Medication SCH UNIT: at 09:29

## 2016-11-08 RX ADMIN — Medication SCH APPLIC: at 13:13

## 2016-11-08 RX ADMIN — Medication SCH MG: at 09:29

## 2016-11-08 RX ADMIN — Medication SCH EACH: at 18:05

## 2016-11-08 RX ADMIN — Medication SCH APPLIC: at 23:03

## 2016-11-08 RX ADMIN — Medication SCH EACH: at 18:04

## 2016-11-08 RX ADMIN — Medication SCH EACH: at 09:29

## 2016-11-09 RX ADMIN — Medication SCH UNIT: at 10:36

## 2016-11-09 RX ADMIN — Medication SCH EACH: at 10:37

## 2016-11-09 RX ADMIN — Medication SCH APPLIC: at 10:38

## 2016-11-09 RX ADMIN — Medication SCH EACH: at 18:10

## 2016-11-09 RX ADMIN — CLOBETASOL PROPIONATE PRN APPLIC: 0.5 CREAM TOPICAL at 10:34

## 2016-11-09 RX ADMIN — Medication SCH MG: at 10:36

## 2016-11-09 RX ADMIN — Medication SCH EACH: at 18:08

## 2016-11-09 RX ADMIN — Medication SCH EACH: at 06:00

## 2016-11-09 RX ADMIN — Medication SCH MG: at 18:09

## 2016-11-09 RX ADMIN — Medication SCH MG: at 18:08

## 2016-11-09 RX ADMIN — MAGNESIUM OXIDE TAB 400 MG (241.3 MG ELEMENTAL MG) SCH TAB: 400 (241.3 MG) TAB at 18:09

## 2016-11-09 NOTE — PN
Progress Note for JENIFER SHEETS  Date: 11/8/2016  Room #:

 

SUBJECTIVE:  This is an 87-year-old on swing bed for over a year now due to

painful osteoarthritis requiring assistance with ADLs.  The patient has been

having more left knee pain for the past few days.  She states she has always had

it, but now, it is affecting her ambulation.  She has been using Tylenol without

significant relief.  She has not been using her Icy Hot.  She is on Pradaxa for

atrial fibrillation.  She has not been taking NSAIDs.  Otherwise, all her lab

work from 10/31 was reviewed and looked okay.

 

OBJECTIVE:  Vital Signs:  Her weight is 78.9 kg, temp 97.9, pulse 75, blood

pressure 128/71, respiratory rate 20, O2 93% on room air.

General:  She is in no acute distress.

Heart:  Irregularly irregular.

Lungs:  Sounds are clear to auscultation bilaterally without crackles or

wheezes.

Extremities: Warm and dry.  No edema.

Mental status:  Alert and orientated x3.

 

Left knee examined.  Range of motion, she has limited extension of the knee, she

is not able to get into full extension, about 10 degrees short.  She does have

limited flexion also, she is not able to get to 90.  She has no swelling but her

tenderness is in the inferior medial aspect over the pes anserine tendon.  She

does have some valgus deformity.  Gait was assessed just by her getting up out

of the chair, she required assistance and did transfer while holding on to her

wheelchair and her chair.

 

ASSESSMENT:

1. Left knee pain, probably due to painful osteoarthritis.  We discussed x-

    rays and injections.  She has never had an injection before but has been

    through therapy, she is willing to try that again.  Maybe some ultrasound

    would help with pain, we will also increase her Tylenol to 1 g at a time up

    to 4 times daily and use her Icy Hot, she declines any stronger pain pills.

2. Atrial fibrillation with previous transient ischemic attack, on Pradaxa.

3. Hypothyroidism.

4. Essential hypertension.

5. Atrophic vaginitis.

6. Mild thrombocytopenia, platelets 126 on 10/31.  She has no bleeding

    problems.

 

PLAN:  At this point, the patient will continue swing bed cares.  We will have

physical therapy help with her left knee pain and ambulation.  Consider knee

brace if appropriate or further therapies.  Consider x-rays of the knee and

injection.  Increase her Tylenol.

 

 

MKA:  11/08/2016 12:32:53  MODL:  11/08/2016 12:58:59

Job #:  [id number]

## 2016-11-10 RX ADMIN — Medication SCH: at 00:30

## 2016-11-10 RX ADMIN — Medication SCH APPLIC: at 23:13

## 2016-11-10 RX ADMIN — CLOBETASOL PROPIONATE PRN APPLIC: 0.5 CREAM TOPICAL at 04:04

## 2016-11-10 RX ADMIN — Medication SCH: at 00:31

## 2016-11-10 RX ADMIN — Medication SCH EACH: at 10:28

## 2016-11-10 RX ADMIN — CLOBETASOL PROPIONATE SCH APPLIC: 0.5 CREAM TOPICAL at 10:27

## 2016-11-10 RX ADMIN — MAGNESIUM OXIDE TAB 400 MG (241.3 MG ELEMENTAL MG) SCH TAB: 400 (241.3 MG) TAB at 18:05

## 2016-11-10 RX ADMIN — Medication SCH EACH: at 10:01

## 2016-11-10 RX ADMIN — Medication SCH EACH: at 06:18

## 2016-11-10 RX ADMIN — Medication SCH EACH: at 18:04

## 2016-11-10 RX ADMIN — Medication SCH MG: at 18:05

## 2016-11-10 RX ADMIN — Medication SCH MG: at 10:02

## 2016-11-10 RX ADMIN — Medication SCH APPLIC: at 10:26

## 2016-11-10 RX ADMIN — Medication SCH EACH: at 18:05

## 2016-11-10 RX ADMIN — Medication SCH UNIT: at 10:02

## 2016-11-11 RX ADMIN — Medication SCH EACH: at 06:24

## 2016-11-11 RX ADMIN — Medication SCH MG: at 17:37

## 2016-11-11 RX ADMIN — Medication SCH EACH: at 18:04

## 2016-11-11 RX ADMIN — Medication SCH: at 23:21

## 2016-11-11 RX ADMIN — Medication SCH EACH: at 23:21

## 2016-11-11 RX ADMIN — Medication SCH MG: at 10:49

## 2016-11-11 RX ADMIN — Medication SCH: at 18:03

## 2016-11-11 RX ADMIN — Medication SCH EACH: at 10:48

## 2016-11-11 RX ADMIN — Medication SCH APPLIC: at 10:51

## 2016-11-11 RX ADMIN — MAGNESIUM OXIDE TAB 400 MG (241.3 MG ELEMENTAL MG) SCH: 400 (241.3 MG) TAB at 18:03

## 2016-11-11 RX ADMIN — Medication SCH: at 18:02

## 2016-11-11 RX ADMIN — Medication SCH EACH: at 17:35

## 2016-11-11 RX ADMIN — MAGNESIUM OXIDE TAB 400 MG (241.3 MG ELEMENTAL MG) SCH TAB: 400 (241.3 MG) TAB at 17:35

## 2016-11-11 RX ADMIN — Medication SCH MG: at 17:35

## 2016-11-11 RX ADMIN — Medication SCH UNIT: at 10:49

## 2016-11-11 RX ADMIN — CLOBETASOL PROPIONATE SCH APPLIC: 0.5 CREAM TOPICAL at 10:30

## 2016-11-11 NOTE — PN
Progress Note for JENIFER SHEETS  Date:  09/03/2016  Room #:

 

SUBJECTIVE:  This is an 87-year-old on long-term swing bed.  She is having less

irritation vaginally, still a little discomfort, but no pain.  She otherwise

continues to have arthritis, aches in her knees, but she is able to get around

her room with her walker.  She also has left shoulder pain, which is long

standing and she said they decided long ago not to do any surgery on that.

 

OBJECTIVE:  Vital Signs:  Her temperature is 97.1, pulse 71, blood pressure

121/53, respiratory rate 18, and O2 of 94 on room air.

General:  She is in no acute distress.

Heart:  Regularly irregular.

Lungs:  Sounds were clear to auscultation bilaterally without crackles or

wheezes.

Extremities:  Warm and dry.  No edema.  She has some deformity to both knees,

probably due to arthritis.

Mental Status:  She is alert and oriented x3.

 

ASSESSMENT:

1. Atrophic vaginitis and vaginal irritation, doing better.  We will continue

    the same creams.

2. Atrial fibrillation with previous TIAs on Pradaxa.

3. Severe osteoarthritis limiting her ability to live independently.

4. Hypothyroidism, essential hypertension, and mild thrombocytopenia.

 

PLAN:  At this point, the patient is recertified for another month.  She will

have lab work on 10/31 for followup.  We will keep her medications the same.  We

will keep her working with bladder therapies and pelvic for exercises as able.

 

 

MKA:  09/03/2016 09:46:59  MODL:  09/03/2016 10:13:19

Job #:  [id number]

## 2016-11-12 RX ADMIN — Medication SCH MG: at 18:19

## 2016-11-12 RX ADMIN — Medication SCH APPLIC: at 10:10

## 2016-11-12 RX ADMIN — Medication SCH EACH: at 18:19

## 2016-11-12 RX ADMIN — Medication SCH EACH: at 10:09

## 2016-11-12 RX ADMIN — Medication SCH UNIT: at 10:09

## 2016-11-12 RX ADMIN — CLOBETASOL PROPIONATE SCH APPLIC: 0.5 CREAM TOPICAL at 10:11

## 2016-11-12 RX ADMIN — MAGNESIUM OXIDE TAB 400 MG (241.3 MG ELEMENTAL MG) SCH TAB: 400 (241.3 MG) TAB at 18:19

## 2016-11-12 RX ADMIN — Medication SCH APPLIC: at 20:50

## 2016-11-12 RX ADMIN — Medication SCH MG: at 10:08

## 2016-11-13 RX ADMIN — Medication SCH EACH: at 18:16

## 2016-11-13 RX ADMIN — Medication SCH MG: at 18:16

## 2016-11-13 RX ADMIN — Medication SCH APPLIC: at 10:48

## 2016-11-13 RX ADMIN — Medication SCH EACH: at 06:39

## 2016-11-13 RX ADMIN — Medication SCH MG: at 10:48

## 2016-11-13 RX ADMIN — Medication SCH: at 23:07

## 2016-11-13 RX ADMIN — MAGNESIUM OXIDE TAB 400 MG (241.3 MG ELEMENTAL MG) SCH TAB: 400 (241.3 MG) TAB at 18:16

## 2016-11-13 RX ADMIN — Medication SCH EACH: at 10:47

## 2016-11-13 RX ADMIN — Medication SCH EACH: at 10:43

## 2016-11-13 RX ADMIN — Medication SCH UNIT: at 10:47

## 2016-11-13 RX ADMIN — CLOBETASOL PROPIONATE SCH APPLIC: 0.5 CREAM TOPICAL at 10:44

## 2016-11-13 RX ADMIN — Medication SCH EACH: at 18:15

## 2016-11-14 RX ADMIN — CLOBETASOL PROPIONATE SCH APPLIC: 0.5 CREAM TOPICAL at 10:18

## 2016-11-14 RX ADMIN — MAGNESIUM OXIDE TAB 400 MG (241.3 MG ELEMENTAL MG) SCH TAB: 400 (241.3 MG) TAB at 18:20

## 2016-11-14 RX ADMIN — Medication SCH MG: at 10:21

## 2016-11-14 RX ADMIN — Medication SCH MG: at 18:20

## 2016-11-14 RX ADMIN — Medication SCH EACH: at 10:21

## 2016-11-14 RX ADMIN — Medication SCH EACH: at 18:19

## 2016-11-14 RX ADMIN — Medication SCH: at 10:20

## 2016-11-14 RX ADMIN — Medication SCH EACH: at 06:16

## 2016-11-14 RX ADMIN — Medication SCH UNIT: at 10:17

## 2016-11-15 RX ADMIN — Medication SCH: at 00:09

## 2016-11-15 RX ADMIN — Medication SCH APPLIC: at 09:39

## 2016-11-15 RX ADMIN — MAGNESIUM OXIDE TAB 400 MG (241.3 MG ELEMENTAL MG) SCH TAB: 400 (241.3 MG) TAB at 20:01

## 2016-11-15 RX ADMIN — Medication SCH MG: at 20:00

## 2016-11-15 RX ADMIN — Medication SCH APPLIC: at 20:02

## 2016-11-15 RX ADMIN — Medication SCH MG: at 20:01

## 2016-11-15 RX ADMIN — Medication SCH MG: at 09:41

## 2016-11-15 RX ADMIN — CLOBETASOL PROPIONATE SCH APPLIC: 0.5 CREAM TOPICAL at 06:17

## 2016-11-15 RX ADMIN — CLOBETASOL PROPIONATE SCH APPLIC: 0.5 CREAM TOPICAL at 09:43

## 2016-11-15 RX ADMIN — Medication SCH EACH: at 20:00

## 2016-11-15 RX ADMIN — Medication SCH EACH: at 09:40

## 2016-11-15 RX ADMIN — Medication SCH EACH: at 06:11

## 2016-11-15 RX ADMIN — Medication SCH UNIT: at 09:40

## 2016-11-16 RX ADMIN — Medication SCH: at 23:06

## 2016-11-16 RX ADMIN — Medication SCH APPLIC: at 09:27

## 2016-11-16 RX ADMIN — Medication SCH MG: at 09:27

## 2016-11-16 RX ADMIN — CLOBETASOL PROPIONATE SCH APPLIC: 0.5 CREAM TOPICAL at 09:28

## 2016-11-16 RX ADMIN — Medication SCH EACH: at 09:28

## 2016-11-16 RX ADMIN — Medication SCH UNIT: at 09:27

## 2016-11-16 RX ADMIN — Medication SCH EACH: at 06:32

## 2016-11-16 RX ADMIN — Medication SCH MG: at 18:02

## 2016-11-16 RX ADMIN — Medication SCH EACH: at 09:26

## 2016-11-16 RX ADMIN — Medication SCH EACH: at 23:06

## 2016-11-16 RX ADMIN — Medication SCH EACH: at 18:02

## 2016-11-16 RX ADMIN — MAGNESIUM OXIDE TAB 400 MG (241.3 MG ELEMENTAL MG) SCH TAB: 400 (241.3 MG) TAB at 18:02

## 2016-11-17 RX ADMIN — Medication SCH MG: at 09:10

## 2016-11-17 RX ADMIN — Medication SCH EACH: at 18:07

## 2016-11-17 RX ADMIN — Medication SCH EACH: at 06:26

## 2016-11-17 RX ADMIN — Medication SCH APPLIC: at 09:11

## 2016-11-17 RX ADMIN — Medication SCH MG: at 18:07

## 2016-11-17 RX ADMIN — Medication SCH EACH: at 18:08

## 2016-11-17 RX ADMIN — Medication SCH EACH: at 09:10

## 2016-11-17 RX ADMIN — Medication SCH APPLIC: at 20:16

## 2016-11-17 RX ADMIN — Medication SCH UNIT: at 09:10

## 2016-11-17 RX ADMIN — MAGNESIUM OXIDE TAB 400 MG (241.3 MG ELEMENTAL MG) SCH TAB: 400 (241.3 MG) TAB at 18:07

## 2016-11-17 RX ADMIN — CLOBETASOL PROPIONATE SCH APPLIC: 0.5 CREAM TOPICAL at 09:11

## 2016-11-18 RX ADMIN — Medication SCH UNIT: at 10:28

## 2016-11-18 RX ADMIN — Medication SCH EACH: at 18:58

## 2016-11-18 RX ADMIN — Medication SCH APPLIC: at 23:13

## 2016-11-18 RX ADMIN — Medication SCH: at 11:41

## 2016-11-18 RX ADMIN — Medication SCH MG: at 10:28

## 2016-11-18 RX ADMIN — Medication SCH APPLIC: at 10:30

## 2016-11-18 RX ADMIN — Medication SCH EACH: at 18:57

## 2016-11-18 RX ADMIN — Medication SCH MG: at 18:58

## 2016-11-18 RX ADMIN — Medication SCH EACH: at 06:03

## 2016-11-18 RX ADMIN — CLOBETASOL PROPIONATE SCH APPLIC: 0.5 CREAM TOPICAL at 10:29

## 2016-11-18 RX ADMIN — Medication SCH EACH: at 10:28

## 2016-11-18 RX ADMIN — Medication SCH EACH: at 23:12

## 2016-11-18 RX ADMIN — MAGNESIUM OXIDE TAB 400 MG (241.3 MG ELEMENTAL MG) SCH TAB: 400 (241.3 MG) TAB at 18:58

## 2016-11-18 RX ADMIN — Medication SCH: at 20:34

## 2016-11-19 RX ADMIN — Medication SCH EACH: at 10:22

## 2016-11-19 RX ADMIN — Medication SCH MG: at 18:43

## 2016-11-19 RX ADMIN — Medication SCH EACH: at 10:23

## 2016-11-19 RX ADMIN — CLOBETASOL PROPIONATE SCH APPLIC: 0.5 CREAM TOPICAL at 10:22

## 2016-11-19 RX ADMIN — Medication SCH UNIT: at 10:22

## 2016-11-19 RX ADMIN — MAGNESIUM OXIDE TAB 400 MG (241.3 MG ELEMENTAL MG) SCH TAB: 400 (241.3 MG) TAB at 18:43

## 2016-11-19 RX ADMIN — Medication SCH: at 10:22

## 2016-11-19 RX ADMIN — Medication SCH MG: at 10:21

## 2016-11-19 RX ADMIN — Medication SCH EACH: at 18:43

## 2016-11-20 RX ADMIN — CLOBETASOL PROPIONATE SCH APPLIC: 0.5 CREAM TOPICAL at 09:01

## 2016-11-20 RX ADMIN — Medication SCH MG: at 09:02

## 2016-11-20 RX ADMIN — Medication SCH EACH: at 06:52

## 2016-11-20 RX ADMIN — Medication SCH UNIT: at 09:02

## 2016-11-20 RX ADMIN — Medication SCH EACH: at 18:37

## 2016-11-20 RX ADMIN — Medication SCH: at 09:04

## 2016-11-20 RX ADMIN — Medication SCH: at 00:30

## 2016-11-20 RX ADMIN — MAGNESIUM OXIDE TAB 400 MG (241.3 MG ELEMENTAL MG) SCH TAB: 400 (241.3 MG) TAB at 18:37

## 2016-11-20 RX ADMIN — Medication SCH EACH: at 09:03

## 2016-11-20 RX ADMIN — Medication SCH MG: at 18:37

## 2016-11-21 RX ADMIN — Medication SCH APPLIC: at 20:59

## 2016-11-21 RX ADMIN — Medication SCH EACH: at 07:01

## 2016-11-21 RX ADMIN — Medication SCH EACH: at 18:03

## 2016-11-21 RX ADMIN — Medication SCH: at 02:09

## 2016-11-21 RX ADMIN — Medication SCH UNIT: at 10:20

## 2016-11-21 RX ADMIN — Medication SCH EACH: at 10:18

## 2016-11-21 RX ADMIN — Medication SCH: at 10:21

## 2016-11-21 RX ADMIN — Medication SCH EACH: at 20:59

## 2016-11-21 RX ADMIN — CLOBETASOL PROPIONATE SCH APPLIC: 0.5 CREAM TOPICAL at 10:22

## 2016-11-21 RX ADMIN — MAGNESIUM OXIDE TAB 400 MG (241.3 MG ELEMENTAL MG) SCH TAB: 400 (241.3 MG) TAB at 18:03

## 2016-11-21 RX ADMIN — Medication SCH MG: at 18:03

## 2016-11-21 RX ADMIN — Medication SCH MG: at 18:04

## 2016-11-21 RX ADMIN — Medication SCH MG: at 10:19

## 2016-11-22 RX ADMIN — Medication SCH EACH: at 10:33

## 2016-11-22 RX ADMIN — Medication SCH: at 10:34

## 2016-11-22 RX ADMIN — Medication SCH MG: at 10:33

## 2016-11-22 RX ADMIN — MAGNESIUM OXIDE TAB 400 MG (241.3 MG ELEMENTAL MG) SCH TAB: 400 (241.3 MG) TAB at 18:13

## 2016-11-22 RX ADMIN — Medication SCH EACH: at 18:12

## 2016-11-22 RX ADMIN — Medication SCH UNIT: at 10:33

## 2016-11-22 RX ADMIN — Medication SCH MG: at 18:13

## 2016-11-22 RX ADMIN — Medication SCH EACH: at 06:39

## 2016-11-22 RX ADMIN — Medication SCH EACH: at 10:32

## 2016-11-22 RX ADMIN — CLOBETASOL PROPIONATE SCH APPLIC: 0.5 CREAM TOPICAL at 10:33

## 2016-11-22 RX ADMIN — Medication SCH MG: at 18:12

## 2016-11-23 RX ADMIN — Medication SCH MG: at 18:30

## 2016-11-23 RX ADMIN — CLOBETASOL PROPIONATE SCH APPLIC: 0.5 CREAM TOPICAL at 09:48

## 2016-11-23 RX ADMIN — Medication SCH UNIT: at 09:47

## 2016-11-23 RX ADMIN — Medication SCH EACH: at 06:34

## 2016-11-23 RX ADMIN — Medication SCH: at 09:48

## 2016-11-23 RX ADMIN — Medication SCH APPLIC: at 22:43

## 2016-11-23 RX ADMIN — Medication SCH MG: at 18:31

## 2016-11-23 RX ADMIN — MAGNESIUM OXIDE TAB 400 MG (241.3 MG ELEMENTAL MG) SCH TAB: 400 (241.3 MG) TAB at 18:30

## 2016-11-23 RX ADMIN — Medication SCH EACH: at 09:47

## 2016-11-23 RX ADMIN — Medication SCH: at 02:56

## 2016-11-23 RX ADMIN — Medication SCH EACH: at 18:31

## 2016-11-23 RX ADMIN — Medication SCH EACH: at 22:43

## 2016-11-23 RX ADMIN — Medication SCH MG: at 09:47

## 2016-11-24 RX ADMIN — CLOBETASOL PROPIONATE SCH APPLICFUL: 0.5 CREAM TOPICAL at 09:41

## 2016-11-24 RX ADMIN — Medication SCH EACH: at 06:08

## 2016-11-24 RX ADMIN — Medication SCH EACH: at 09:43

## 2016-11-24 RX ADMIN — Medication SCH EACH: at 18:31

## 2016-11-24 RX ADMIN — Medication SCH MG: at 18:31

## 2016-11-24 RX ADMIN — Medication SCH UNIT: at 09:42

## 2016-11-24 RX ADMIN — Medication SCH MG: at 09:43

## 2016-11-24 RX ADMIN — Medication SCH: at 10:06

## 2016-11-24 RX ADMIN — MAGNESIUM OXIDE TAB 400 MG (241.3 MG ELEMENTAL MG) SCH TAB: 400 (241.3 MG) TAB at 18:30

## 2016-11-25 RX ADMIN — Medication SCH: at 10:37

## 2016-11-25 RX ADMIN — Medication SCH UNIT: at 10:35

## 2016-11-25 RX ADMIN — CLOBETASOL PROPIONATE SCH APPLICFUL: 0.5 CREAM TOPICAL at 10:35

## 2016-11-25 RX ADMIN — Medication SCH MG: at 18:37

## 2016-11-25 RX ADMIN — MAGNESIUM OXIDE TAB 400 MG (241.3 MG ELEMENTAL MG) SCH TAB: 400 (241.3 MG) TAB at 18:37

## 2016-11-25 RX ADMIN — Medication SCH EACH: at 23:12

## 2016-11-25 RX ADMIN — Medication SCH EACH: at 10:35

## 2016-11-25 RX ADMIN — Medication SCH: at 23:12

## 2016-11-25 RX ADMIN — Medication SCH: at 03:44

## 2016-11-25 RX ADMIN — Medication SCH MG: at 10:35

## 2016-11-25 RX ADMIN — Medication SCH EACH: at 10:31

## 2016-11-25 RX ADMIN — Medication SCH EACH: at 06:30

## 2016-11-25 RX ADMIN — Medication SCH EACH: at 18:37

## 2016-11-26 RX ADMIN — Medication SCH UNIT: at 10:02

## 2016-11-26 RX ADMIN — MAGNESIUM OXIDE TAB 400 MG (241.3 MG ELEMENTAL MG) SCH TAB: 400 (241.3 MG) TAB at 19:37

## 2016-11-26 RX ADMIN — CLOBETASOL PROPIONATE SCH APPLICFUL: 0.5 CREAM TOPICAL at 10:03

## 2016-11-26 RX ADMIN — Medication SCH MG: at 19:36

## 2016-11-26 RX ADMIN — Medication SCH APPLIC: at 19:38

## 2016-11-26 RX ADMIN — Medication SCH MG: at 10:02

## 2016-11-26 RX ADMIN — Medication SCH EACH: at 19:36

## 2016-11-26 RX ADMIN — Medication SCH: at 10:03

## 2016-11-26 RX ADMIN — Medication SCH: at 21:09

## 2016-11-26 RX ADMIN — Medication SCH EACH: at 10:01

## 2016-11-27 RX ADMIN — Medication SCH UNIT: at 09:30

## 2016-11-27 RX ADMIN — Medication SCH MG: at 18:00

## 2016-11-27 RX ADMIN — CLOBETASOL PROPIONATE SCH APPLICFUL: 0.5 CREAM TOPICAL at 09:31

## 2016-11-27 RX ADMIN — Medication SCH: at 09:32

## 2016-11-27 RX ADMIN — Medication SCH EACH: at 06:51

## 2016-11-27 RX ADMIN — Medication SCH MG: at 18:25

## 2016-11-27 RX ADMIN — Medication SCH EACH: at 18:23

## 2016-11-27 RX ADMIN — Medication SCH EACH: at 18:24

## 2016-11-27 RX ADMIN — Medication SCH EACH: at 09:29

## 2016-11-27 RX ADMIN — Medication SCH: at 23:00

## 2016-11-27 RX ADMIN — Medication SCH MG: at 09:30

## 2016-11-27 RX ADMIN — MAGNESIUM OXIDE TAB 400 MG (241.3 MG ELEMENTAL MG) SCH TAB: 400 (241.3 MG) TAB at 18:24

## 2016-11-28 RX ADMIN — CLOBETASOL PROPIONATE SCH APPLICFUL: 0.5 CREAM TOPICAL at 10:27

## 2016-11-28 RX ADMIN — Medication SCH: at 10:32

## 2016-11-28 RX ADMIN — Medication SCH EACH: at 06:10

## 2016-11-28 RX ADMIN — Medication SCH MG: at 18:19

## 2016-11-28 RX ADMIN — Medication SCH EACH: at 10:25

## 2016-11-28 RX ADMIN — Medication SCH UNIT: at 10:32

## 2016-11-28 RX ADMIN — Medication SCH: at 22:56

## 2016-11-28 RX ADMIN — Medication SCH EACH: at 10:32

## 2016-11-28 RX ADMIN — Medication SCH EACH: at 18:20

## 2016-11-28 RX ADMIN — Medication SCH MG: at 10:32

## 2016-11-28 RX ADMIN — Medication SCH EACH: at 22:57

## 2016-11-28 RX ADMIN — MAGNESIUM OXIDE TAB 400 MG (241.3 MG ELEMENTAL MG) SCH TAB: 400 (241.3 MG) TAB at 18:19

## 2016-11-29 RX ADMIN — Medication SCH EACH: at 09:43

## 2016-11-29 RX ADMIN — Medication SCH UNIT: at 09:44

## 2016-11-29 RX ADMIN — Medication SCH MG: at 18:12

## 2016-11-29 RX ADMIN — Medication SCH EACH: at 18:11

## 2016-11-29 RX ADMIN — Medication SCH: at 09:45

## 2016-11-29 RX ADMIN — Medication SCH: at 21:56

## 2016-11-29 RX ADMIN — CLOBETASOL PROPIONATE SCH APPLICFUL: 0.5 CREAM TOPICAL at 09:44

## 2016-11-29 RX ADMIN — MAGNESIUM OXIDE TAB 400 MG (241.3 MG ELEMENTAL MG) SCH TAB: 400 (241.3 MG) TAB at 18:12

## 2016-11-29 RX ADMIN — Medication SCH EACH: at 06:10

## 2016-11-29 RX ADMIN — Medication SCH MG: at 09:43

## 2016-11-29 RX ADMIN — Medication SCH MG: at 18:11

## 2016-11-30 RX ADMIN — Medication SCH EACH: at 23:04

## 2016-11-30 RX ADMIN — CLOBETASOL PROPIONATE SCH: 0.5 CREAM TOPICAL at 10:35

## 2016-11-30 RX ADMIN — Medication SCH EACH: at 18:03

## 2016-11-30 RX ADMIN — Medication SCH MG: at 10:34

## 2016-11-30 RX ADMIN — Medication SCH MG: at 18:04

## 2016-11-30 RX ADMIN — Medication SCH EACH: at 06:24

## 2016-11-30 RX ADMIN — MAGNESIUM OXIDE TAB 400 MG (241.3 MG ELEMENTAL MG) SCH TAB: 400 (241.3 MG) TAB at 18:04

## 2016-11-30 RX ADMIN — Medication SCH EACH: at 10:34

## 2016-11-30 RX ADMIN — Medication SCH EACH: at 18:04

## 2016-11-30 RX ADMIN — Medication SCH UNIT: at 10:33

## 2016-12-01 RX ADMIN — CLOBETASOL PROPIONATE SCH APPLICFUL: 0.5 CREAM TOPICAL at 10:29

## 2016-12-01 RX ADMIN — Medication SCH EACH: at 06:10

## 2016-12-01 RX ADMIN — Medication SCH EACH: at 10:30

## 2016-12-01 RX ADMIN — Medication SCH MG: at 10:28

## 2016-12-01 RX ADMIN — Medication SCH UNIT: at 10:28

## 2016-12-01 RX ADMIN — Medication SCH MG: at 21:30

## 2016-12-01 RX ADMIN — Medication SCH MG: at 21:29

## 2016-12-01 RX ADMIN — Medication SCH EACH: at 21:32

## 2016-12-01 RX ADMIN — Medication SCH EACH: at 21:29

## 2016-12-01 RX ADMIN — MAGNESIUM OXIDE TAB 400 MG (241.3 MG ELEMENTAL MG) SCH TAB: 400 (241.3 MG) TAB at 21:31

## 2016-12-01 RX ADMIN — Medication SCH EACH: at 10:27

## 2016-12-02 RX ADMIN — Medication SCH EACH: at 18:04

## 2016-12-02 RX ADMIN — Medication SCH UNIT: at 09:44

## 2016-12-02 RX ADMIN — Medication SCH EACH: at 07:47

## 2016-12-02 RX ADMIN — MAGNESIUM OXIDE TAB 400 MG (241.3 MG ELEMENTAL MG) SCH TAB: 400 (241.3 MG) TAB at 18:04

## 2016-12-02 RX ADMIN — Medication SCH EACH: at 09:44

## 2016-12-02 RX ADMIN — Medication SCH EACH: at 18:05

## 2016-12-02 RX ADMIN — Medication SCH EACH: at 21:56

## 2016-12-02 RX ADMIN — Medication SCH MG: at 09:44

## 2016-12-02 RX ADMIN — CLOBETASOL PROPIONATE SCH APPLICFUL: 0.5 CREAM TOPICAL at 09:45

## 2016-12-02 RX ADMIN — Medication SCH MG: at 18:04

## 2016-12-03 RX ADMIN — Medication SCH EACH: at 09:29

## 2016-12-03 RX ADMIN — Medication SCH MG: at 18:10

## 2016-12-03 RX ADMIN — Medication PRN APPLIC: at 09:36

## 2016-12-03 RX ADMIN — Medication SCH UNIT: at 09:35

## 2016-12-03 RX ADMIN — Medication SCH EACH: at 18:09

## 2016-12-03 RX ADMIN — MAGNESIUM OXIDE TAB 400 MG (241.3 MG ELEMENTAL MG) SCH TAB: 400 (241.3 MG) TAB at 18:09

## 2016-12-03 RX ADMIN — Medication SCH MG: at 09:35

## 2016-12-03 RX ADMIN — CLOBETASOL PROPIONATE SCH APPLICFUL: 0.5 CREAM TOPICAL at 09:35

## 2016-12-03 RX ADMIN — Medication SCH MG: at 18:09

## 2016-12-03 NOTE — PCM.PN
- General Info


Date of Service: 12/03/16


Admission Dx/Problem (Free Text): 





History:


I was asked to see patient this morning because she has had a change in her 

condition.  Normally she has been able to walk to the bathroom by herself, has 

been quite stable over the year or so that she has lived here.  Today she is 

mildly hypertensive, still alert and oriented but C/O feeling lightheaded when 

she goes to the bathroom.  No actual syncope.  She denies any cough, congestion

, chest pain or other pain and no dyspnea.  She denies any spinning sensation.





She has been on Pradaxa since discovery of her chronic atrial fibrillation a 

few years back, this discovery precipitated by weakness and lightheadedness.








- Patient Data


Vitals - most recent: 


 Last Vital Signs











Temp  36.2 C   12/03/16 09:47


 


Pulse  71   12/03/16 09:47


 


Resp  16   12/03/16 09:47


 


BP  149/78 H  12/03/16 09:47


 


Pulse Ox  96   12/03/16 09:47











Weight - most recent: 78.471 kg


Med Orders - Current: 


 Current Medications





Acetaminophen (Tylenol Extra Strength)  1,000 mg PO Q6H PRN


   PRN Reason: Pain


   Last Admin: 12/03/16 09:36 Dose:  1,000 mg


Carbamide Perox/Anhydrous Glycerin (Debrox 6.5% Otic Soln)  0 ml EARLF DAILY PRN


   PRN Reason: Other


   Last Admin: 08/04/16 18:14 Dose:  15 ml


Clobetasol Propionate (Clobetasol 0.05%)  0 gm TOP DAILY@1000 [facility]


   Last Admin: 12/03/16 09:35 Dose:  1 applicful


Methyl Salicylate (Icy Hot Cream)  0 gm TOP BID@1000,2200 PRN


   PRN Reason: Pain


   Last Admin: 12/03/16 09:36 Dose:  1 applic


Multivitamins/Minerals (Thera M Plus)  1 tab PO BEDTIME@1900 [facility]


   Last Admin: 12/02/16 18:04 Dose:  1 tab


Ubidecarenone [Co Q- 10] 100 Mg*Own Supply*  100 mg PO BID@1000,1900 [facility]


   Last Admin: 12/03/16 09:35 Dose:  100 mg





Discontinued Medications





Acetaminophen (Tylenol Extra Strength)  500 mg PO Q4H PRN


   PRN Reason: pain


   Last Admin: 06/14/16 09:45 Dose:  500 mg


Acetaminophen (Tylenol Arthritis Pain)  650 mg PO Q8H PRN


   PRN Reason: Pain


   Last Admin: 11/08/16 09:30 Dose:  650 mg


Betamethasone/Clotrimazole (Lotrisone)  0 gm TOP BEDTIME [facility]


   Stop: 07/04/16 22:00


   Last Admin: 07/04/16 23:16 Dose:  1 applic


Betamethasone/Clotrimazole (Lotrisone)  0 gm TOP MoWeFr [facility]


   Last Admin: 07/26/16 00:26 Dose:  Not Given


Betamethasone/Clotrimazole (Lotrisone)  0 gm TOP MoWeFr@2200 [facility]


   Last Admin: 09/06/16 00:08 Dose:  Not Given


Clobetasol Propionate (Clobetasol 0.05%)  0 gm TOP BID [facility]


   Stop: 03/18/16 21:12


   Last Admin: 03/05/16 13:26 Dose:  1 applic


Clobetasol Propionate (Clobetasol 0.05%)  0 gm TOP BID@0800,1900 [facility]


   Last Admin: 03/21/16 09:23 Dose:  1 applic


Clobetasol Propionate (Clobetasol 0.05%)  1 gm TOP BID@0800,1900 PRN


   PRN Reason: Itching


   Last Admin: 06/05/16 08:59 Dose:  1 applic


Clobetasol Propionate (Clobetasol 0.05%)  1 gm TOP BID@1000,1900 PRN


   PRN Reason: Itching


   Last Admin: 11/10/16 04:04 Dose:  1 applic


Clobetasol Propionate (Clobetasol 0.05%)  1 gm TOP DAILY@1000 [facility]


   Last Admin: 11/23/16 09:48 Dose:  1 applic


Dabigatran (Pradaxa)  0 mg PO BID [facility]


   Last Admin: 10/07/15 09:06 Dose:  150 mg


Influenza Virus Vaccine (Fluvirin 2015-16)  45 mcg IM .ONCE ONE


   Stop: 10/06/15 15:28


   Last Admin: 10/06/15 16:14 Dose:  45 mcg


Influenza Virus Vaccine (Fluzone High Dose 2016-17)  180 mcg IM .ONCE ONE


   Stop: 10/03/16 10:01


   Last Admin: 10/03/16 15:00 Dose:  180 mcg


Levothyroxine Sodium (Levothyroxine)  75 mcg PO ACBREAKFAST [facility]


   Last Admin: 11/02/15 06:13 Dose:  75 mcg


Levothyroxine Sodium (Levothyroxine)  75 mcg PO MOTUWETHFRSA@07 [facility]


   Last Admin: 11/14/15 06:47 Dose:  75 mcg


Levothyroxine Sodium (Levothyroxine)  75 mcg PO SuMoTuWeThFr@07 [facility]


   Stop: 11/15/15 09:15


Levothyroxine Sodium (Levothyroxine)  75 mcg PO SuMoTuWeThFr@07 [facility]


   Last Admin: 06/10/16 06:23 Dose:  75 mcg


Levothyroxine Sodium (Levothyroxine)  75 mcg PO SuMoTuWeThFr@09 [facility]


Levothyroxine Sodium (Levothyroxine)  75 mcg PO SuMoTuWeThFr@10 [facility]


   Last Admin: 06/10/16 10:11 Dose:  Not Given


Levothyroxine Sodium (Levothyroxine)  75 mcg PO SuMoTuWeThFr@0700 [facility]


   Last Admin: 06/30/16 06:10 Dose:  75 mcg


Methyl Salicylate (Icy Hot Cream)  1 gm TOP ASDIRECTED PRN


   PRN Reason: Pain


   Last Admin: 07/07/16 09:57 Dose:  1 applic


Methyl Salicylate (Icy Hot Cream)  0 gm TOP BID [facility]


   Last Admin: 11/09/16 10:38 Dose:  1 applic


Methyl Salicylate (Icy Hot Cream)  0 gm TOP BID@1000,2000 [facility]


   Last Admin: 11/18/16 20:34 Dose:  Not Given


Methyl Salicylate (Icy Hot Cream)  0 gm TOP BID@1000,2200 [facility]


   Last Admin: 11/29/16 21:56 Dose:  Not Given


Metoprolol Tartrate (Lopressor)  12.5 mg PO DAILY [facility]


   Last Admin: 11/15/15 08:20 Dose:  12.5 mg


Miscellaneous Medication (Calcium Carb/Mag Ox/Zinc Gluc [Calcium-Magnesium-Zinc]

)  1 each PO BEDTIME [facility]


   Last Admin: 02/29/16 18:04 Dose:  1 each


Miscellaneous Medication (Cholecalciferol (Vitamin D3) [Vitamin D3])  5,000 

unit PO DAILY [facility]


   Last Admin: 06/10/16 09:36 Dose:  5,000 unit


Miscellaneous Medication (Metoprolol Tartrate [Metoprolol Tartrate])  12.5 mg 

PO DAILY [facility]


   Last Admin: 10/08/15 08:42 Dose:  12.5 mg


Miscellaneous Medication (Omega-3/Dha/Epa/Fish Oil [Omega-3 Fish Oil 1,200 Mg 

Sfgl])  1,200 mg PO BEDTIME [facility]


   Last Admin: 02/29/16 18:04 Dose:  1,200 mg


Miscellaneous Medication (Ubidecarenone [Co Q-10])  100 mg PO BID [facility]


   Last Admin: 02/29/16 18:05 Dose:  100 mg


Miscellaneous Medication (Nf Drug)  1 each PO BID [facility]


   Last Admin: 12/29/15 09:51 Dose:  1 each


Miscellaneous Medication (Nf Drug)  0 each PO BID [facility]


   Last Admin: 02/29/16 18:05 Dose:  1 each


Miscellaneous Medication (Ubidecarenone [Co Q-10])  100 mg PO BID@0800,1900 [facility]


   Last Admin: 06/10/16 09:36 Dose:  100 mg


Miscellaneous Medication (Nf Drug)  0 each PO BID@0800,1900 [facility]


   Last Admin: 06/10/16 09:35 Dose:  1 each


Miscellaneous Medication (Nf Drug)  1 each TOP Q3D [facility]


   Last Admin: 05/06/16 09:20 Dose:  1 each


Miscellaneous Medication (Nf Drug)  0 each TOP Q3D [facility]


   Last Admin: 06/08/16 10:44 Dose:  1 each


Multivitamins/Minerals (Thera M Plus)  1 tab PO BEDTIME [facility]


   Last Admin: 02/29/16 18:05 Dose:  1 tab











- Exam


Physical Findings Comments:: 





Exam:


-Alert and oriented, converses about community affairs eagerly and is cheerful


-BP just over 160


-Heart irregular, cannot tell if it is atrial fib or not because there are some 

spells of quite regular rhythm, then goes irregular again.  No murmur


-No facial droop or weakness in arms or legs


-No carotid bruit


-No ankle edema








- Problem List Review


Problem List Initiated/Reviewed/Updated: Yes





- Assessment


Assessment:: 





Impression:


-Spell of lightheadedness and mild hypertension


-Unremarkable


-Mentally she seems fine and not distressed by this








- Plan


Plan:: 





Plan:


Observe, get help on going to bathroom until we are sure she is OK again

## 2016-12-04 RX ADMIN — Medication SCH MG: at 09:10

## 2016-12-04 RX ADMIN — Medication SCH EACH: at 06:31

## 2016-12-04 RX ADMIN — Medication SCH MG: at 18:24

## 2016-12-04 RX ADMIN — Medication SCH EACH: at 18:23

## 2016-12-04 RX ADMIN — Medication SCH EACH: at 18:24

## 2016-12-04 RX ADMIN — Medication SCH UNIT: at 09:10

## 2016-12-04 RX ADMIN — Medication SCH EACH: at 09:11

## 2016-12-04 RX ADMIN — MAGNESIUM OXIDE TAB 400 MG (241.3 MG ELEMENTAL MG) SCH TAB: 400 (241.3 MG) TAB at 18:23

## 2016-12-04 RX ADMIN — Medication SCH EACH: at 09:09

## 2016-12-04 RX ADMIN — CLOBETASOL PROPIONATE SCH APPLICFUL: 0.5 CREAM TOPICAL at 09:11

## 2016-12-05 RX ADMIN — Medication SCH EACH: at 18:10

## 2016-12-05 RX ADMIN — Medication SCH EACH: at 06:06

## 2016-12-05 RX ADMIN — Medication SCH EACH: at 22:51

## 2016-12-05 RX ADMIN — MAGNESIUM OXIDE TAB 400 MG (241.3 MG ELEMENTAL MG) SCH TAB: 400 (241.3 MG) TAB at 18:10

## 2016-12-05 RX ADMIN — CLOBETASOL PROPIONATE SCH APPLICFUL: 0.5 CREAM TOPICAL at 10:19

## 2016-12-05 RX ADMIN — Medication SCH EACH: at 10:18

## 2016-12-05 RX ADMIN — Medication SCH MG: at 18:10

## 2016-12-05 RX ADMIN — Medication SCH MG: at 10:17

## 2016-12-05 RX ADMIN — Medication SCH EACH: at 18:11

## 2016-12-05 RX ADMIN — Medication SCH UNIT: at 10:16

## 2016-12-05 NOTE — PN
Progress Note for JENIFER SHEETS  Date:  12/04/2016  Room #:

 

SUBJECTIVE:  This is an 87-year-old, who began feeling unwell the evening of

12/2.  Normally, she can help take herself to the bathroom, but she just did not

feel sure of herself, needed some more help.  She felt wore out.  She felt

dizzy, but not like the room spinning, but more like weak like she could go

down.  She does have a bad left leg, right below the knee area in her muscles

and tendon.  She also has pretty significant osteoarthritis.  The patient has a

longstanding history of atrial fibrillation.  She has been on Pradaxa.  She does

not take any rate controlling agents.  Otherwise, she had lab work checked the

end of October, all looked quite well.  She felt well the next day in the

afternoon, but was still feeling a little weak in the morning, so was assessed

by Dr. Roberts.  She had more frequent vital sign monitoring, blood pressures were

running actually a little bit higher.  Again this morning, the nurses did

mention to me on rounds, I recommended more frequent vitals and telemetry

monitoring with an EKG.  EKG did show atrial fibrillation with slow ventricular

response.  Telemetry had shown up to 3.5 second pauses, but again she was not

having any symptoms during this time, and was feeling better.  She had rates

down into the mid 30s; for the majority, she had sinus chelsey in the 50s.

 

OBJECTIVE:  Vital Signs:  Temperature 97.3, pulse 46, blood pressure 132/51,

respiratory rate 18, O2 of 95% on room air.

General:  She is in no acute distress.

Heart:  Irregularly irregular with murmur noted.

Lungs:  Sounds were clear to auscultation bilaterally without crackles or

wheezes.

Extremities:  Warm and dry.  No edema.

Mental Status:  She is alert and orientated x3.

 

ASSESSMENT:

1. Atrial fibrillation with bradycardia and up to 3.5-second pauses.  She is

    feeling well now. I discussed with her we will continue telemetry

    monitoring, if she is not feeling well, she should notify the nurses.  We

    did discuss even the potential for pacemaker and she stated if it needed to

    be done then she would be agreeable to that.  For now, she is also on

    Pradaxa, we will continue with the same.

2. Hypothyroidism, treated.  Recent TSH around 1.5.  No reason for dose

    change.

3. Central hypertension, controlled.

4. Atrophic vaginitis.

5. Osteoarthritis with left knee pain.

6. Mild thrombocytopenia.

 

PLAN:  At this point, we will see how she does.  If her symptoms become more

persistent, then I will repeat lab work.  We will monitor her with telemetry for

now.

 

 

MKA:  12/04/2016 21:04:16  MODL:  12/04/2016 23:32:38

Job #:  [id number]

## 2016-12-06 RX ADMIN — Medication SCH MG: at 18:11

## 2016-12-06 RX ADMIN — Medication SCH EACH: at 10:20

## 2016-12-06 RX ADMIN — MAGNESIUM OXIDE TAB 400 MG (241.3 MG ELEMENTAL MG) SCH TAB: 400 (241.3 MG) TAB at 18:11

## 2016-12-06 RX ADMIN — Medication SCH EACH: at 06:03

## 2016-12-06 RX ADMIN — Medication SCH EACH: at 18:11

## 2016-12-06 RX ADMIN — CLOBETASOL PROPIONATE SCH APPLICFUL: 0.5 CREAM TOPICAL at 10:17

## 2016-12-06 RX ADMIN — Medication SCH MG: at 10:19

## 2016-12-06 RX ADMIN — Medication SCH UNIT: at 10:19

## 2016-12-06 NOTE — PCM.SN
- Free Text/Narrative


Note: 





Patient had no further events of feeling week.  She had no pauses longer than 3 

seconds.  She continued to be in a. fib.  Telemetry d/c today.

## 2016-12-07 RX ADMIN — Medication SCH EACH: at 10:36

## 2016-12-07 RX ADMIN — Medication SCH EACH: at 05:52

## 2016-12-07 RX ADMIN — Medication SCH: at 11:26

## 2016-12-07 RX ADMIN — Medication SCH EACH: at 10:38

## 2016-12-07 RX ADMIN — MAGNESIUM OXIDE TAB 400 MG (241.3 MG ELEMENTAL MG) SCH TAB: 400 (241.3 MG) TAB at 18:25

## 2016-12-07 RX ADMIN — Medication SCH EACH: at 23:10

## 2016-12-07 RX ADMIN — Medication SCH EACH: at 18:25

## 2016-12-07 RX ADMIN — Medication SCH MG: at 10:37

## 2016-12-07 RX ADMIN — Medication SCH MG: at 18:25

## 2016-12-07 RX ADMIN — CLOBETASOL PROPIONATE SCH APPLICFUL: 0.5 CREAM TOPICAL at 10:39

## 2016-12-07 RX ADMIN — Medication SCH UNIT: at 10:37

## 2016-12-08 RX ADMIN — CLOBETASOL PROPIONATE SCH APPLICFUL: 0.5 CREAM TOPICAL at 10:17

## 2016-12-08 RX ADMIN — Medication SCH MG: at 18:46

## 2016-12-08 RX ADMIN — Medication SCH UNIT: at 10:16

## 2016-12-08 RX ADMIN — Medication SCH EACH: at 10:16

## 2016-12-08 RX ADMIN — MAGNESIUM OXIDE TAB 400 MG (241.3 MG ELEMENTAL MG) SCH TAB: 400 (241.3 MG) TAB at 18:48

## 2016-12-08 RX ADMIN — Medication SCH EACH: at 06:29

## 2016-12-08 RX ADMIN — Medication SCH MG: at 18:47

## 2016-12-08 RX ADMIN — Medication SCH MG: at 10:16

## 2016-12-08 RX ADMIN — Medication SCH EACH: at 18:47

## 2016-12-09 RX ADMIN — Medication SCH EACH: at 18:23

## 2016-12-09 RX ADMIN — Medication SCH EACH: at 09:40

## 2016-12-09 RX ADMIN — Medication SCH MG: at 18:23

## 2016-12-09 RX ADMIN — Medication SCH EACH: at 18:22

## 2016-12-09 RX ADMIN — Medication SCH MG: at 09:39

## 2016-12-09 RX ADMIN — Medication SCH UNIT: at 09:39

## 2016-12-09 RX ADMIN — Medication SCH EACH: at 06:33

## 2016-12-09 RX ADMIN — CLOBETASOL PROPIONATE SCH APPLICFUL: 0.5 CREAM TOPICAL at 09:42

## 2016-12-09 RX ADMIN — Medication SCH EACH: at 22:24

## 2016-12-09 RX ADMIN — Medication SCH MG: at 18:22

## 2016-12-09 RX ADMIN — MAGNESIUM OXIDE TAB 400 MG (241.3 MG ELEMENTAL MG) SCH TAB: 400 (241.3 MG) TAB at 18:22

## 2016-12-10 RX ADMIN — Medication SCH MG: at 10:11

## 2016-12-10 RX ADMIN — CLOBETASOL PROPIONATE SCH APPLICFUL: 0.5 CREAM TOPICAL at 10:13

## 2016-12-10 RX ADMIN — Medication SCH EACH: at 10:12

## 2016-12-10 RX ADMIN — Medication SCH EACH: at 10:11

## 2016-12-10 RX ADMIN — Medication SCH MG: at 18:20

## 2016-12-10 RX ADMIN — MAGNESIUM OXIDE TAB 400 MG (241.3 MG ELEMENTAL MG) SCH TAB: 400 (241.3 MG) TAB at 18:19

## 2016-12-10 RX ADMIN — Medication SCH UNIT: at 10:11

## 2016-12-10 RX ADMIN — Medication SCH EACH: at 18:20

## 2016-12-10 RX ADMIN — Medication SCH MG: at 18:19

## 2016-12-11 RX ADMIN — Medication SCH MG: at 09:24

## 2016-12-11 RX ADMIN — MAGNESIUM OXIDE TAB 400 MG (241.3 MG ELEMENTAL MG) SCH TAB: 400 (241.3 MG) TAB at 18:51

## 2016-12-11 RX ADMIN — Medication SCH EACH: at 18:51

## 2016-12-11 RX ADMIN — Medication SCH MG: at 18:51

## 2016-12-11 RX ADMIN — Medication SCH UNIT: at 09:24

## 2016-12-11 RX ADMIN — Medication SCH EACH: at 09:23

## 2016-12-11 RX ADMIN — Medication SCH EACH: at 06:23

## 2016-12-11 RX ADMIN — Medication SCH MG: at 18:52

## 2016-12-11 RX ADMIN — CLOBETASOL PROPIONATE SCH APPLICFUL: 0.5 CREAM TOPICAL at 09:24

## 2016-12-12 RX ADMIN — Medication SCH EACH: at 18:05

## 2016-12-12 RX ADMIN — Medication SCH EACH: at 18:04

## 2016-12-12 RX ADMIN — MAGNESIUM OXIDE TAB 400 MG (241.3 MG ELEMENTAL MG) SCH TAB: 400 (241.3 MG) TAB at 18:04

## 2016-12-12 RX ADMIN — Medication SCH UNIT: at 09:46

## 2016-12-12 RX ADMIN — CLOBETASOL PROPIONATE SCH APPLICFUL: 0.5 CREAM TOPICAL at 09:47

## 2016-12-12 RX ADMIN — Medication SCH EACH: at 09:47

## 2016-12-12 RX ADMIN — Medication SCH MG: at 18:04

## 2016-12-12 RX ADMIN — Medication SCH MG: at 09:46

## 2016-12-12 RX ADMIN — Medication SCH EACH: at 06:13

## 2016-12-12 RX ADMIN — Medication SCH EACH: at 23:10

## 2016-12-13 RX ADMIN — Medication SCH EACH: at 09:21

## 2016-12-13 RX ADMIN — Medication SCH MG: at 09:22

## 2016-12-13 RX ADMIN — Medication SCH UNIT: at 09:22

## 2016-12-13 RX ADMIN — Medication SCH EACH: at 09:22

## 2016-12-13 RX ADMIN — MAGNESIUM OXIDE TAB 400 MG (241.3 MG ELEMENTAL MG) SCH TAB: 400 (241.3 MG) TAB at 18:24

## 2016-12-13 RX ADMIN — Medication SCH EACH: at 18:24

## 2016-12-13 RX ADMIN — Medication SCH EACH: at 06:25

## 2016-12-13 RX ADMIN — CLOBETASOL PROPIONATE SCH APPLICFUL: 0.5 CREAM TOPICAL at 09:22

## 2016-12-13 RX ADMIN — Medication SCH MG: at 18:24

## 2016-12-14 RX ADMIN — Medication SCH EACH: at 18:15

## 2016-12-14 RX ADMIN — Medication SCH MG: at 09:48

## 2016-12-14 RX ADMIN — Medication SCH UNIT: at 09:48

## 2016-12-14 RX ADMIN — Medication SCH MG: at 18:14

## 2016-12-14 RX ADMIN — Medication SCH EACH: at 23:17

## 2016-12-14 RX ADMIN — CLOBETASOL PROPIONATE SCH APPLICFUL: 0.5 CREAM TOPICAL at 09:49

## 2016-12-14 RX ADMIN — Medication SCH EACH: at 18:14

## 2016-12-14 RX ADMIN — Medication SCH EACH: at 06:33

## 2016-12-14 RX ADMIN — MAGNESIUM OXIDE TAB 400 MG (241.3 MG ELEMENTAL MG) SCH TAB: 400 (241.3 MG) TAB at 18:14

## 2016-12-14 RX ADMIN — Medication SCH EACH: at 09:48

## 2016-12-15 RX ADMIN — Medication SCH UNIT: at 09:28

## 2016-12-15 RX ADMIN — Medication SCH MG: at 09:29

## 2016-12-15 RX ADMIN — Medication SCH EACH: at 18:11

## 2016-12-15 RX ADMIN — Medication SCH EACH: at 06:34

## 2016-12-15 RX ADMIN — Medication SCH MG: at 18:11

## 2016-12-15 RX ADMIN — Medication SCH EACH: at 09:28

## 2016-12-15 RX ADMIN — CLOBETASOL PROPIONATE SCH APPLICFUL: 0.5 CREAM TOPICAL at 09:29

## 2016-12-15 RX ADMIN — MAGNESIUM OXIDE TAB 400 MG (241.3 MG ELEMENTAL MG) SCH TAB: 400 (241.3 MG) TAB at 18:11

## 2016-12-15 RX ADMIN — Medication SCH EACH: at 18:10

## 2016-12-16 RX ADMIN — Medication SCH EACH: at 23:17

## 2016-12-16 RX ADMIN — Medication SCH UNIT: at 09:32

## 2016-12-16 RX ADMIN — Medication SCH EACH: at 19:34

## 2016-12-16 RX ADMIN — Medication SCH EACH: at 09:37

## 2016-12-16 RX ADMIN — Medication SCH MG: at 09:32

## 2016-12-16 RX ADMIN — Medication SCH MG: at 19:34

## 2016-12-16 RX ADMIN — Medication SCH EACH: at 09:32

## 2016-12-16 RX ADMIN — MAGNESIUM OXIDE TAB 400 MG (241.3 MG ELEMENTAL MG) SCH TAB: 400 (241.3 MG) TAB at 19:35

## 2016-12-16 RX ADMIN — CLOBETASOL PROPIONATE SCH APPLICFUL: 0.5 CREAM TOPICAL at 09:36

## 2016-12-16 RX ADMIN — Medication SCH EACH: at 06:27

## 2016-12-17 RX ADMIN — MAGNESIUM OXIDE TAB 400 MG (241.3 MG ELEMENTAL MG) SCH TAB: 400 (241.3 MG) TAB at 18:20

## 2016-12-17 RX ADMIN — Medication SCH MG: at 09:43

## 2016-12-17 RX ADMIN — Medication SCH EACH: at 09:42

## 2016-12-17 RX ADMIN — Medication SCH MG: at 18:20

## 2016-12-17 RX ADMIN — Medication SCH UNIT: at 09:43

## 2016-12-17 RX ADMIN — CLOBETASOL PROPIONATE SCH APPLICFUL: 0.5 CREAM TOPICAL at 09:44

## 2016-12-17 RX ADMIN — Medication SCH EACH: at 18:20

## 2016-12-18 RX ADMIN — Medication SCH EACH: at 10:36

## 2016-12-18 RX ADMIN — Medication SCH EACH: at 05:51

## 2016-12-18 RX ADMIN — CLOBETASOL PROPIONATE SCH APPLICFUL: 0.5 CREAM TOPICAL at 10:34

## 2016-12-18 RX ADMIN — Medication SCH MG: at 10:36

## 2016-12-18 RX ADMIN — Medication SCH UNIT: at 10:36

## 2016-12-18 RX ADMIN — Medication SCH EACH: at 18:01

## 2016-12-18 RX ADMIN — Medication SCH EACH: at 18:00

## 2016-12-18 RX ADMIN — Medication SCH MG: at 18:00

## 2016-12-18 RX ADMIN — MAGNESIUM OXIDE TAB 400 MG (241.3 MG ELEMENTAL MG) SCH TAB: 400 (241.3 MG) TAB at 18:01

## 2016-12-18 RX ADMIN — Medication SCH: at 06:34

## 2016-12-19 RX ADMIN — Medication SCH EACH: at 18:24

## 2016-12-19 RX ADMIN — Medication SCH MG: at 18:24

## 2016-12-19 RX ADMIN — Medication SCH EACH: at 09:26

## 2016-12-19 RX ADMIN — Medication SCH EACH: at 23:31

## 2016-12-19 RX ADMIN — Medication SCH EACH: at 06:55

## 2016-12-19 RX ADMIN — Medication SCH UNIT: at 09:26

## 2016-12-19 RX ADMIN — MAGNESIUM OXIDE TAB 400 MG (241.3 MG ELEMENTAL MG) SCH TAB: 400 (241.3 MG) TAB at 18:24

## 2016-12-19 RX ADMIN — Medication SCH EACH: at 18:23

## 2016-12-19 RX ADMIN — Medication SCH EACH: at 09:25

## 2016-12-19 RX ADMIN — Medication SCH MG: at 09:24

## 2016-12-19 RX ADMIN — CLOBETASOL PROPIONATE SCH APPLICFUL: 0.5 CREAM TOPICAL at 09:26

## 2016-12-20 RX ADMIN — Medication SCH UNIT: at 10:00

## 2016-12-20 RX ADMIN — Medication SCH EACH: at 10:00

## 2016-12-20 RX ADMIN — Medication SCH EACH: at 07:51

## 2016-12-20 RX ADMIN — MAGNESIUM OXIDE TAB 400 MG (241.3 MG ELEMENTAL MG) SCH TAB: 400 (241.3 MG) TAB at 17:38

## 2016-12-20 RX ADMIN — Medication SCH MG: at 10:00

## 2016-12-20 RX ADMIN — Medication SCH: at 18:04

## 2016-12-20 RX ADMIN — Medication SCH MG: at 18:05

## 2016-12-20 RX ADMIN — Medication SCH EACH: at 17:39

## 2016-12-20 RX ADMIN — MAGNESIUM OXIDE TAB 400 MG (241.3 MG ELEMENTAL MG) SCH: 400 (241.3 MG) TAB at 18:04

## 2016-12-20 RX ADMIN — Medication SCH EACH: at 18:04

## 2016-12-20 RX ADMIN — CLOBETASOL PROPIONATE SCH APPLICFUL: 0.5 CREAM TOPICAL at 10:01

## 2016-12-21 RX ADMIN — Medication SCH MG: at 09:00

## 2016-12-21 RX ADMIN — MAGNESIUM OXIDE TAB 400 MG (241.3 MG ELEMENTAL MG) SCH: 400 (241.3 MG) TAB at 18:06

## 2016-12-21 RX ADMIN — Medication SCH EACH: at 17:12

## 2016-12-21 RX ADMIN — Medication SCH EACH: at 17:11

## 2016-12-21 RX ADMIN — Medication SCH MG: at 17:10

## 2016-12-21 RX ADMIN — Medication SCH EACH: at 06:15

## 2016-12-21 RX ADMIN — CLOBETASOL PROPIONATE SCH APPLICFUL: 0.5 CREAM TOPICAL at 09:00

## 2016-12-21 RX ADMIN — Medication SCH UNIT: at 08:59

## 2016-12-21 RX ADMIN — Medication SCH: at 18:06

## 2016-12-21 RX ADMIN — Medication SCH MG: at 17:11

## 2016-12-21 RX ADMIN — Medication SCH EACH: at 08:59

## 2016-12-21 RX ADMIN — Medication SCH EACH: at 21:54

## 2016-12-21 RX ADMIN — MAGNESIUM OXIDE TAB 400 MG (241.3 MG ELEMENTAL MG) SCH TAB: 400 (241.3 MG) TAB at 17:12

## 2016-12-22 RX ADMIN — MAGNESIUM OXIDE TAB 400 MG (241.3 MG ELEMENTAL MG) SCH TAB: 400 (241.3 MG) TAB at 18:10

## 2016-12-22 RX ADMIN — Medication SCH UNIT: at 10:35

## 2016-12-22 RX ADMIN — Medication SCH EACH: at 10:34

## 2016-12-22 RX ADMIN — Medication SCH EACH: at 06:00

## 2016-12-22 RX ADMIN — CLOBETASOL PROPIONATE SCH APPLICFUL: 0.5 CREAM TOPICAL at 10:31

## 2016-12-22 RX ADMIN — Medication SCH MG: at 10:35

## 2016-12-22 RX ADMIN — Medication SCH MG: at 18:10

## 2016-12-22 RX ADMIN — Medication SCH EACH: at 10:35

## 2016-12-22 RX ADMIN — Medication SCH EACH: at 18:10

## 2016-12-23 RX ADMIN — Medication SCH EACH: at 23:18

## 2016-12-23 RX ADMIN — Medication SCH EACH: at 18:03

## 2016-12-23 RX ADMIN — Medication SCH MG: at 18:03

## 2016-12-23 RX ADMIN — Medication SCH UNIT: at 09:58

## 2016-12-23 RX ADMIN — CLOBETASOL PROPIONATE SCH APPLICFUL: 0.5 CREAM TOPICAL at 10:00

## 2016-12-23 RX ADMIN — DEXTRAN 70, HYPROMELLOSE 2910 SCH: 3; 1 SOLUTION/ DROPS OPHTHALMIC at 20:03

## 2016-12-23 RX ADMIN — Medication SCH MG: at 18:02

## 2016-12-23 RX ADMIN — DEXTRAN 70, HYPROMELLOSE 2910 SCH EACH: 3; 1 SOLUTION/ DROPS OPHTHALMIC at 18:02

## 2016-12-23 RX ADMIN — Medication SCH EACH: at 06:40

## 2016-12-23 RX ADMIN — Medication SCH MG: at 09:59

## 2016-12-23 RX ADMIN — Medication SCH EACH: at 10:01

## 2016-12-23 RX ADMIN — Medication SCH EACH: at 18:04

## 2016-12-23 RX ADMIN — MAGNESIUM OXIDE TAB 400 MG (241.3 MG ELEMENTAL MG) SCH TAB: 400 (241.3 MG) TAB at 18:02

## 2016-12-23 NOTE — PCM.SN
- Free Text/Narrative


Note: 





She had some eye irritation this AM, both eyes, it is better now.  Kind of 

itchy and maybe a little crusty.  Vision is ok.  She sent a picture to her son 

he ordered some erythromycin drops but I don't feel its an infection so I am 

going to have her use regular eye drops and if her eye gets red or painful we 

can start the erythromycin drops.  This was discussed with Medina and the nurse.

## 2016-12-24 RX ADMIN — Medication SCH EACH: at 10:06

## 2016-12-24 RX ADMIN — Medication SCH MG: at 18:05

## 2016-12-24 RX ADMIN — Medication SCH MG: at 18:03

## 2016-12-24 RX ADMIN — Medication SCH EACH: at 18:04

## 2016-12-24 RX ADMIN — Medication SCH MG: at 10:05

## 2016-12-24 RX ADMIN — CLOBETASOL PROPIONATE SCH APPLICFUL: 0.5 CREAM TOPICAL at 10:02

## 2016-12-24 RX ADMIN — DEXTRAN 70, HYPROMELLOSE 2910 SCH EACH: 3; 1 SOLUTION/ DROPS OPHTHALMIC at 10:17

## 2016-12-24 RX ADMIN — Medication SCH UNIT: at 10:04

## 2016-12-24 RX ADMIN — DEXTRAN 70, HYPROMELLOSE 2910 SCH EACH: 3; 1 SOLUTION/ DROPS OPHTHALMIC at 19:21

## 2016-12-24 RX ADMIN — Medication SCH EACH: at 18:05

## 2016-12-24 RX ADMIN — MAGNESIUM OXIDE TAB 400 MG (241.3 MG ELEMENTAL MG) SCH TAB: 400 (241.3 MG) TAB at 18:04

## 2016-12-25 RX ADMIN — Medication SCH EACH: at 10:07

## 2016-12-25 RX ADMIN — Medication SCH MG: at 18:40

## 2016-12-25 RX ADMIN — Medication SCH MG: at 10:09

## 2016-12-25 RX ADMIN — Medication SCH EACH: at 06:05

## 2016-12-25 RX ADMIN — MAGNESIUM OXIDE TAB 400 MG (241.3 MG ELEMENTAL MG) SCH TAB: 400 (241.3 MG) TAB at 18:41

## 2016-12-25 RX ADMIN — DEXTRAN 70, HYPROMELLOSE 2910 SCH EACH: 3; 1 SOLUTION/ DROPS OPHTHALMIC at 19:26

## 2016-12-25 RX ADMIN — Medication SCH EACH: at 18:41

## 2016-12-25 RX ADMIN — DEXTRAN 70, HYPROMELLOSE 2910 SCH EACH: 3; 1 SOLUTION/ DROPS OPHTHALMIC at 10:06

## 2016-12-25 RX ADMIN — Medication SCH UNIT: at 10:08

## 2016-12-25 RX ADMIN — Medication SCH EACH: at 10:16

## 2016-12-25 RX ADMIN — CLOBETASOL PROPIONATE SCH APPLICFUL: 0.5 CREAM TOPICAL at 10:15

## 2016-12-25 RX ADMIN — Medication SCH EACH: at 18:39

## 2016-12-26 RX ADMIN — DEXTRAN 70, HYPROMELLOSE 2910 SCH EACH: 3; 1 SOLUTION/ DROPS OPHTHALMIC at 18:03

## 2016-12-26 RX ADMIN — DEXTRAN 70, HYPROMELLOSE 2910 SCH EACH: 3; 1 SOLUTION/ DROPS OPHTHALMIC at 09:55

## 2016-12-26 RX ADMIN — Medication SCH UNIT: at 09:54

## 2016-12-26 RX ADMIN — Medication SCH MG: at 17:59

## 2016-12-26 RX ADMIN — CLOBETASOL PROPIONATE SCH APPLICFUL: 0.5 CREAM TOPICAL at 09:53

## 2016-12-26 RX ADMIN — Medication PRN APPLIC: at 23:13

## 2016-12-26 RX ADMIN — Medication SCH MG: at 09:54

## 2016-12-26 RX ADMIN — Medication SCH EACH: at 17:59

## 2016-12-26 RX ADMIN — Medication SCH EACH: at 18:00

## 2016-12-26 RX ADMIN — MAGNESIUM OXIDE TAB 400 MG (241.3 MG ELEMENTAL MG) SCH TAB: 400 (241.3 MG) TAB at 18:00

## 2016-12-26 RX ADMIN — Medication SCH EACH: at 06:26

## 2016-12-26 RX ADMIN — Medication SCH EACH: at 09:53

## 2016-12-26 RX ADMIN — Medication SCH EACH: at 23:12

## 2016-12-27 RX ADMIN — Medication SCH MG: at 18:24

## 2016-12-27 RX ADMIN — Medication SCH EACH: at 10:36

## 2016-12-27 RX ADMIN — Medication SCH MG: at 10:28

## 2016-12-27 RX ADMIN — Medication SCH EACH: at 18:21

## 2016-12-27 RX ADMIN — Medication SCH MG: at 18:21

## 2016-12-27 RX ADMIN — DEXTRAN 70, HYPROMELLOSE 2910 SCH EACH: 3; 1 SOLUTION/ DROPS OPHTHALMIC at 18:25

## 2016-12-27 RX ADMIN — MAGNESIUM OXIDE TAB 400 MG (241.3 MG ELEMENTAL MG) SCH TAB: 400 (241.3 MG) TAB at 18:22

## 2016-12-27 RX ADMIN — Medication SCH: at 06:20

## 2016-12-27 RX ADMIN — Medication SCH UNIT: at 10:27

## 2016-12-27 RX ADMIN — DEXTRAN 70, HYPROMELLOSE 2910 SCH EACH: 3; 1 SOLUTION/ DROPS OPHTHALMIC at 10:33

## 2016-12-27 RX ADMIN — CLOBETASOL PROPIONATE SCH APPLICFUL: 0.5 CREAM TOPICAL at 10:32

## 2016-12-27 RX ADMIN — Medication SCH EACH: at 10:27

## 2016-12-27 RX ADMIN — Medication SCH EACH: at 18:22

## 2016-12-28 RX ADMIN — DEXTRAN 70, HYPROMELLOSE 2910 SCH EACH: 3; 1 SOLUTION/ DROPS OPHTHALMIC at 18:04

## 2016-12-28 RX ADMIN — Medication SCH: at 23:27

## 2016-12-28 RX ADMIN — Medication SCH EACH: at 18:02

## 2016-12-28 RX ADMIN — MAGNESIUM OXIDE TAB 400 MG (241.3 MG ELEMENTAL MG) SCH TAB: 400 (241.3 MG) TAB at 18:03

## 2016-12-28 RX ADMIN — Medication SCH UNIT: at 09:30

## 2016-12-28 RX ADMIN — Medication SCH EACH: at 09:31

## 2016-12-28 RX ADMIN — Medication SCH EACH: at 18:03

## 2016-12-28 RX ADMIN — Medication SCH EACH: at 09:29

## 2016-12-28 RX ADMIN — Medication SCH MG: at 18:03

## 2016-12-28 RX ADMIN — Medication SCH MG: at 18:02

## 2016-12-28 RX ADMIN — Medication SCH EACH: at 06:27

## 2016-12-28 RX ADMIN — CLOBETASOL PROPIONATE SCH APPLICFUL: 0.5 CREAM TOPICAL at 09:31

## 2016-12-28 RX ADMIN — DEXTRAN 70, HYPROMELLOSE 2910 SCH EACH: 3; 1 SOLUTION/ DROPS OPHTHALMIC at 09:31

## 2016-12-28 RX ADMIN — Medication SCH MG: at 09:30

## 2016-12-29 RX ADMIN — Medication SCH EACH: at 18:00

## 2016-12-29 RX ADMIN — Medication SCH MG: at 17:59

## 2016-12-29 RX ADMIN — CLOBETASOL PROPIONATE SCH APPLICFUL: 0.5 CREAM TOPICAL at 09:34

## 2016-12-29 RX ADMIN — Medication SCH MG: at 09:32

## 2016-12-29 RX ADMIN — Medication SCH EACH: at 17:59

## 2016-12-29 RX ADMIN — Medication SCH EACH: at 09:32

## 2016-12-29 RX ADMIN — MAGNESIUM OXIDE TAB 400 MG (241.3 MG ELEMENTAL MG) SCH TAB: 400 (241.3 MG) TAB at 18:00

## 2016-12-29 RX ADMIN — DEXTRAN 70, HYPROMELLOSE 2910 SCH EACH: 3; 1 SOLUTION/ DROPS OPHTHALMIC at 18:00

## 2016-12-29 RX ADMIN — DEXTRAN 70, HYPROMELLOSE 2910 SCH EACH: 3; 1 SOLUTION/ DROPS OPHTHALMIC at 09:34

## 2016-12-29 RX ADMIN — Medication SCH UNIT: at 09:32

## 2016-12-29 RX ADMIN — Medication SCH EACH: at 06:42

## 2016-12-30 RX ADMIN — Medication SCH MG: at 09:40

## 2016-12-30 RX ADMIN — Medication SCH MG: at 18:11

## 2016-12-30 RX ADMIN — DEXTRAN 70, HYPROMELLOSE 2910 SCH EACH: 3; 1 SOLUTION/ DROPS OPHTHALMIC at 09:41

## 2016-12-30 RX ADMIN — Medication SCH EACH: at 18:13

## 2016-12-30 RX ADMIN — Medication SCH MG: at 18:12

## 2016-12-30 RX ADMIN — CLOBETASOL PROPIONATE SCH APPLICFUL: 0.5 CREAM TOPICAL at 09:38

## 2016-12-30 RX ADMIN — DEXTRAN 70, HYPROMELLOSE 2910 SCH EACH: 3; 1 SOLUTION/ DROPS OPHTHALMIC at 18:32

## 2016-12-30 RX ADMIN — Medication SCH EACH: at 05:28

## 2016-12-30 RX ADMIN — Medication SCH EACH: at 09:36

## 2016-12-30 RX ADMIN — MAGNESIUM OXIDE TAB 400 MG (241.3 MG ELEMENTAL MG) SCH TAB: 400 (241.3 MG) TAB at 18:12

## 2016-12-30 RX ADMIN — Medication SCH: at 06:02

## 2016-12-30 RX ADMIN — Medication SCH EACH: at 23:02

## 2016-12-30 RX ADMIN — Medication SCH UNIT: at 09:37

## 2016-12-31 RX ADMIN — DEXTRAN 70, HYPROMELLOSE 2910 SCH EACH: 3; 1 SOLUTION/ DROPS OPHTHALMIC at 19:00

## 2016-12-31 RX ADMIN — Medication SCH UNIT: at 09:50

## 2016-12-31 RX ADMIN — MAGNESIUM OXIDE TAB 400 MG (241.3 MG ELEMENTAL MG) SCH TAB: 400 (241.3 MG) TAB at 18:59

## 2016-12-31 RX ADMIN — Medication SCH MG: at 18:59

## 2016-12-31 RX ADMIN — Medication SCH EACH: at 18:57

## 2016-12-31 RX ADMIN — Medication SCH MG: at 09:51

## 2016-12-31 RX ADMIN — Medication SCH EACH: at 18:59

## 2016-12-31 RX ADMIN — CLOBETASOL PROPIONATE SCH APPLICFUL: 0.5 CREAM TOPICAL at 09:52

## 2016-12-31 RX ADMIN — DEXTRAN 70, HYPROMELLOSE 2910 SCH EACH: 3; 1 SOLUTION/ DROPS OPHTHALMIC at 10:15

## 2016-12-31 RX ADMIN — Medication SCH EACH: at 09:52

## 2016-12-31 RX ADMIN — Medication SCH EACH: at 09:51

## 2017-01-01 RX ADMIN — DEXTRAN 70, HYPROMELLOSE 2910 SCH EACH: 3; 1 SOLUTION/ DROPS OPHTHALMIC at 10:14

## 2017-01-01 RX ADMIN — Medication SCH EACH: at 06:05

## 2017-01-01 RX ADMIN — MAGNESIUM OXIDE TAB 400 MG (241.3 MG ELEMENTAL MG) SCH TAB: 400 (241.3 MG) TAB at 18:25

## 2017-01-01 RX ADMIN — Medication SCH MG: at 18:26

## 2017-01-01 RX ADMIN — Medication SCH MG: at 10:14

## 2017-01-01 RX ADMIN — CLOBETASOL PROPIONATE SCH APPLICFUL: 0.5 CREAM TOPICAL at 10:15

## 2017-01-01 RX ADMIN — Medication SCH EACH: at 18:25

## 2017-01-01 RX ADMIN — DEXTRAN 70, HYPROMELLOSE 2910 SCH EACH: 3; 1 SOLUTION/ DROPS OPHTHALMIC at 18:26

## 2017-01-01 RX ADMIN — Medication SCH MG: at 18:25

## 2017-01-01 RX ADMIN — Medication SCH UNIT: at 10:15

## 2017-01-01 RX ADMIN — Medication SCH EACH: at 10:14

## 2017-01-01 RX ADMIN — Medication SCH EACH: at 18:24

## 2017-01-02 RX ADMIN — CLOBETASOL PROPIONATE SCH APPLICFUL: 0.5 CREAM TOPICAL at 10:16

## 2017-01-02 RX ADMIN — Medication SCH EACH: at 10:13

## 2017-01-02 RX ADMIN — Medication SCH MG: at 10:14

## 2017-01-02 RX ADMIN — Medication SCH UNIT: at 10:14

## 2017-01-02 RX ADMIN — Medication SCH EACH: at 18:19

## 2017-01-02 RX ADMIN — DEXTRAN 70, HYPROMELLOSE 2910 SCH EACH: 3; 1 SOLUTION/ DROPS OPHTHALMIC at 10:17

## 2017-01-02 RX ADMIN — MAGNESIUM OXIDE TAB 400 MG (241.3 MG ELEMENTAL MG) SCH TAB: 400 (241.3 MG) TAB at 18:18

## 2017-01-02 RX ADMIN — Medication SCH MG: at 18:18

## 2017-01-02 RX ADMIN — Medication SCH EACH: at 23:03

## 2017-01-02 RX ADMIN — Medication SCH MG: at 18:19

## 2017-01-02 RX ADMIN — DEXTRAN 70, HYPROMELLOSE 2910 SCH EACH: 3; 1 SOLUTION/ DROPS OPHTHALMIC at 18:19

## 2017-01-02 RX ADMIN — Medication SCH EACH: at 06:32

## 2017-01-02 RX ADMIN — Medication SCH EACH: at 18:18

## 2017-01-03 RX ADMIN — Medication SCH UNIT: at 09:21

## 2017-01-03 RX ADMIN — Medication SCH EACH: at 18:25

## 2017-01-03 RX ADMIN — MAGNESIUM OXIDE TAB 400 MG (241.3 MG ELEMENTAL MG) SCH TAB: 400 (241.3 MG) TAB at 18:25

## 2017-01-03 RX ADMIN — Medication SCH EACH: at 09:23

## 2017-01-03 RX ADMIN — Medication SCH MG: at 18:25

## 2017-01-03 RX ADMIN — Medication SCH EACH: at 06:11

## 2017-01-03 RX ADMIN — Medication SCH EACH: at 18:24

## 2017-01-03 RX ADMIN — Medication SCH MG: at 18:24

## 2017-01-03 RX ADMIN — Medication SCH EACH: at 12:13

## 2017-01-03 RX ADMIN — DEXTRAN 70, HYPROMELLOSE 2910 SCH EACH: 3; 1 SOLUTION/ DROPS OPHTHALMIC at 09:22

## 2017-01-03 RX ADMIN — CLOBETASOL PROPIONATE SCH APPLICFUL: 0.5 CREAM TOPICAL at 09:22

## 2017-01-03 RX ADMIN — Medication SCH MG: at 09:21

## 2017-01-03 RX ADMIN — DEXTRAN 70, HYPROMELLOSE 2910 SCH EACH: 3; 1 SOLUTION/ DROPS OPHTHALMIC at 18:25

## 2017-01-04 RX ADMIN — Medication SCH EACH: at 22:51

## 2017-01-04 RX ADMIN — Medication SCH EACH: at 10:13

## 2017-01-04 RX ADMIN — Medication SCH UNIT: at 10:12

## 2017-01-04 RX ADMIN — Medication SCH EACH: at 18:29

## 2017-01-04 RX ADMIN — MAGNESIUM OXIDE TAB 400 MG (241.3 MG ELEMENTAL MG) SCH TAB: 400 (241.3 MG) TAB at 18:30

## 2017-01-04 RX ADMIN — DEXTRAN 70, HYPROMELLOSE 2910 SCH EACH: 3; 1 SOLUTION/ DROPS OPHTHALMIC at 10:14

## 2017-01-04 RX ADMIN — Medication SCH MG: at 18:30

## 2017-01-04 RX ADMIN — DEXTRAN 70, HYPROMELLOSE 2910 SCH EACH: 3; 1 SOLUTION/ DROPS OPHTHALMIC at 18:31

## 2017-01-04 RX ADMIN — Medication SCH MG: at 10:13

## 2017-01-04 RX ADMIN — Medication SCH EACH: at 08:52

## 2017-01-04 RX ADMIN — Medication SCH EACH: at 18:30

## 2017-01-04 RX ADMIN — CLOBETASOL PROPIONATE SCH APPLICFUL: 0.5 CREAM TOPICAL at 10:14

## 2017-01-05 RX ADMIN — DEXTRAN 70, HYPROMELLOSE 2910 SCH EACH: 3; 1 SOLUTION/ DROPS OPHTHALMIC at 18:27

## 2017-01-05 RX ADMIN — Medication SCH EACH: at 06:00

## 2017-01-05 RX ADMIN — MAGNESIUM OXIDE TAB 400 MG (241.3 MG ELEMENTAL MG) SCH TAB: 400 (241.3 MG) TAB at 18:26

## 2017-01-05 RX ADMIN — Medication SCH UNIT: at 09:30

## 2017-01-05 RX ADMIN — Medication SCH EACH: at 09:32

## 2017-01-05 RX ADMIN — Medication SCH MG: at 09:32

## 2017-01-05 RX ADMIN — Medication SCH MG: at 18:24

## 2017-01-05 RX ADMIN — Medication SCH EACH: at 18:25

## 2017-01-05 RX ADMIN — DEXTRAN 70, HYPROMELLOSE 2910 SCH EACH: 3; 1 SOLUTION/ DROPS OPHTHALMIC at 09:33

## 2017-01-05 RX ADMIN — Medication SCH EACH: at 18:26

## 2017-01-05 RX ADMIN — Medication SCH MG: at 18:25

## 2017-01-05 RX ADMIN — CLOBETASOL PROPIONATE SCH APPLICFUL: 0.5 CREAM TOPICAL at 09:31

## 2017-01-06 RX ADMIN — Medication SCH EACH: at 10:22

## 2017-01-06 RX ADMIN — CLOBETASOL PROPIONATE SCH APPLICFUL: 0.5 CREAM TOPICAL at 10:23

## 2017-01-06 RX ADMIN — Medication SCH UNIT: at 10:21

## 2017-01-06 RX ADMIN — Medication SCH EACH: at 10:21

## 2017-01-06 RX ADMIN — DEXTRAN 70, HYPROMELLOSE 2910 SCH EACH: 3; 1 SOLUTION/ DROPS OPHTHALMIC at 17:59

## 2017-01-06 RX ADMIN — MAGNESIUM OXIDE TAB 400 MG (241.3 MG ELEMENTAL MG) SCH TAB: 400 (241.3 MG) TAB at 17:59

## 2017-01-06 RX ADMIN — Medication SCH EACH: at 17:59

## 2017-01-06 RX ADMIN — Medication SCH EACH: at 06:17

## 2017-01-06 RX ADMIN — Medication SCH MG: at 10:20

## 2017-01-06 RX ADMIN — Medication SCH EACH: at 22:57

## 2017-01-06 RX ADMIN — Medication SCH MG: at 17:59

## 2017-01-06 RX ADMIN — DEXTRAN 70, HYPROMELLOSE 2910 SCH EACH: 3; 1 SOLUTION/ DROPS OPHTHALMIC at 10:30

## 2017-01-06 RX ADMIN — Medication PRN APPLIC: at 22:57

## 2017-01-07 RX ADMIN — DEXTRAN 70, HYPROMELLOSE 2910 SCH EACH: 3; 1 SOLUTION/ DROPS OPHTHALMIC at 09:46

## 2017-01-07 RX ADMIN — Medication SCH UNIT: at 09:46

## 2017-01-07 RX ADMIN — Medication SCH MG: at 18:04

## 2017-01-07 RX ADMIN — CLOBETASOL PROPIONATE SCH APPLICFUL: 0.5 CREAM TOPICAL at 09:46

## 2017-01-07 RX ADMIN — Medication SCH MG: at 09:45

## 2017-01-07 RX ADMIN — Medication SCH EACH: at 09:45

## 2017-01-07 RX ADMIN — Medication SCH EACH: at 18:04

## 2017-01-07 RX ADMIN — MAGNESIUM OXIDE TAB 400 MG (241.3 MG ELEMENTAL MG) SCH TAB: 400 (241.3 MG) TAB at 18:04

## 2017-01-07 RX ADMIN — DEXTRAN 70, HYPROMELLOSE 2910 SCH EACH: 3; 1 SOLUTION/ DROPS OPHTHALMIC at 18:04

## 2017-01-08 RX ADMIN — DEXTRAN 70, HYPROMELLOSE 2910 SCH EACH: 3; 1 SOLUTION/ DROPS OPHTHALMIC at 18:58

## 2017-01-08 RX ADMIN — MAGNESIUM OXIDE TAB 400 MG (241.3 MG ELEMENTAL MG) SCH TAB: 400 (241.3 MG) TAB at 18:41

## 2017-01-08 RX ADMIN — DEXTRAN 70, HYPROMELLOSE 2910 SCH EACH: 3; 1 SOLUTION/ DROPS OPHTHALMIC at 09:47

## 2017-01-08 RX ADMIN — Medication SCH MG: at 09:36

## 2017-01-08 RX ADMIN — Medication SCH MG: at 18:41

## 2017-01-08 RX ADMIN — Medication SCH EACH: at 06:38

## 2017-01-08 RX ADMIN — Medication SCH EACH: at 09:36

## 2017-01-08 RX ADMIN — Medication SCH EACH: at 18:42

## 2017-01-08 RX ADMIN — Medication SCH UNIT: at 09:36

## 2017-01-08 RX ADMIN — Medication SCH MG: at 18:42

## 2017-01-08 RX ADMIN — Medication SCH EACH: at 18:41

## 2017-01-08 RX ADMIN — CLOBETASOL PROPIONATE SCH APPLICFUL: 0.5 CREAM TOPICAL at 09:38

## 2017-01-09 RX ADMIN — Medication SCH UNIT: at 10:42

## 2017-01-09 RX ADMIN — DEXTRAN 70, HYPROMELLOSE 2910 SCH EACH: 3; 1 SOLUTION/ DROPS OPHTHALMIC at 18:24

## 2017-01-09 RX ADMIN — CLOBETASOL PROPIONATE SCH APPLICFUL: 0.5 CREAM TOPICAL at 10:43

## 2017-01-09 RX ADMIN — Medication SCH EACH: at 06:46

## 2017-01-09 RX ADMIN — MAGNESIUM OXIDE TAB 400 MG (241.3 MG ELEMENTAL MG) SCH TAB: 400 (241.3 MG) TAB at 18:23

## 2017-01-09 RX ADMIN — Medication SCH MG: at 10:42

## 2017-01-09 RX ADMIN — Medication SCH EACH: at 10:44

## 2017-01-09 RX ADMIN — Medication SCH EACH: at 18:23

## 2017-01-09 RX ADMIN — Medication SCH MG: at 18:22

## 2017-01-09 RX ADMIN — Medication SCH EACH: at 18:24

## 2017-01-09 RX ADMIN — DEXTRAN 70, HYPROMELLOSE 2910 SCH EACH: 3; 1 SOLUTION/ DROPS OPHTHALMIC at 10:45

## 2017-01-09 RX ADMIN — Medication SCH EACH: at 23:34

## 2017-01-09 RX ADMIN — Medication SCH EACH: at 10:43

## 2017-01-09 RX ADMIN — Medication SCH MG: at 18:23

## 2017-01-10 RX ADMIN — Medication SCH MG: at 17:59

## 2017-01-10 RX ADMIN — CLOBETASOL PROPIONATE SCH APPLICFUL: 0.5 CREAM TOPICAL at 10:05

## 2017-01-10 RX ADMIN — Medication SCH MG: at 10:04

## 2017-01-10 RX ADMIN — DEXTRAN 70, HYPROMELLOSE 2910 SCH EACH: 3; 1 SOLUTION/ DROPS OPHTHALMIC at 10:04

## 2017-01-10 RX ADMIN — MAGNESIUM OXIDE TAB 400 MG (241.3 MG ELEMENTAL MG) SCH TAB: 400 (241.3 MG) TAB at 17:59

## 2017-01-10 RX ADMIN — Medication SCH EACH: at 10:05

## 2017-01-10 RX ADMIN — Medication SCH EACH: at 05:51

## 2017-01-10 RX ADMIN — Medication SCH EACH: at 17:59

## 2017-01-10 RX ADMIN — DEXTRAN 70, HYPROMELLOSE 2910 SCH EACH: 3; 1 SOLUTION/ DROPS OPHTHALMIC at 17:59

## 2017-01-10 RX ADMIN — Medication SCH UNIT: at 10:05

## 2017-01-10 RX ADMIN — Medication SCH EACH: at 10:06

## 2017-01-10 RX ADMIN — Medication SCH MG: at 18:02

## 2017-01-11 RX ADMIN — Medication SCH EACH: at 09:53

## 2017-01-11 RX ADMIN — CLOBETASOL PROPIONATE SCH APPLICFUL: 0.5 CREAM TOPICAL at 09:55

## 2017-01-11 RX ADMIN — DEXTRAN 70, HYPROMELLOSE 2910 SCH EACH: 3; 1 SOLUTION/ DROPS OPHTHALMIC at 18:38

## 2017-01-11 RX ADMIN — Medication SCH MG: at 18:37

## 2017-01-11 RX ADMIN — Medication SCH MG: at 18:38

## 2017-01-11 RX ADMIN — Medication SCH MG: at 09:54

## 2017-01-11 RX ADMIN — Medication SCH UNIT: at 09:54

## 2017-01-11 RX ADMIN — DEXTRAN 70, HYPROMELLOSE 2910 SCH EACH: 3; 1 SOLUTION/ DROPS OPHTHALMIC at 09:54

## 2017-01-11 RX ADMIN — MAGNESIUM OXIDE TAB 400 MG (241.3 MG ELEMENTAL MG) SCH TAB: 400 (241.3 MG) TAB at 18:38

## 2017-01-11 RX ADMIN — Medication SCH EACH: at 18:37

## 2017-01-11 RX ADMIN — Medication SCH EACH: at 22:40

## 2017-01-11 RX ADMIN — Medication SCH EACH: at 18:36

## 2017-01-11 RX ADMIN — Medication SCH EACH: at 06:02

## 2017-01-11 NOTE — PN
Progress Note for JENIFER SHEETS  Date:  01/11/2017  Room #:

 

SUBJECTIVE:  An 87-year-old on long-term swing bed for severe osteoarthritis,

which affects mobility and her ability to perform her own ADLs.  She states she

has been doing good.  The creams we are using on her bottom have helped.  There

is no irritation now.  She had some episodes of weakness last month, but those

totally resolved.  She admits today that it could be emotional as they lost a

family friend.  Otherwise, her breathing has been good.  She is not having any

pain.  Her eye irritation that she had, also cleared up with just over-the-

counter drops.  No antibiotic drops were needed.

 

OBJECTIVE:  Vital Signs:  Her temperature is 98.7, her weight is 79.4 kg, pulse

75, blood pressure 138/59, respiratory rate 20, O2 of 96% on room air.

General:  She is in no acute distress.

Heart:  Regularly irregular with murmur.

Lung:  Sounds are clear to auscultation bilaterally without crackles or wheezes.

Extremities:  Warm and dry.  No edema.

Mental Status:  Alert and orientated x3.

 

ASSESSMENT:

1. Atrial fibrillation.  She did have some bradycardia last month, now heart

    rates have been fine.  She is feeling fine.  No further monitoring is

    needed.  She will continue her Pradaxa for stroke prevention.

2. Hypothyroidism, treated.

3. Essential hypertension, controlled.

4. Atrophic vaginitis.  She has already been seen by GYN.  She is on

    appropriate treatments.

5. Osteoarthritis.

6. Mild thrombocytopenia.

 

PLAN:  At this point, we will continue swing bed cares.  She is not due for any

lab work.  We will evaluate her again next month.

 

 

MKA:  01/11/2017 13:14:00  MODL:  01/11/2017 13:37:46

Job #:  [id number]

## 2017-01-12 RX ADMIN — Medication SCH MG: at 18:44

## 2017-01-12 RX ADMIN — DEXTRAN 70, HYPROMELLOSE 2910 SCH EACH: 3; 1 SOLUTION/ DROPS OPHTHALMIC at 11:05

## 2017-01-12 RX ADMIN — Medication SCH EACH: at 18:43

## 2017-01-12 RX ADMIN — Medication SCH UNIT: at 11:04

## 2017-01-12 RX ADMIN — Medication SCH EACH: at 10:57

## 2017-01-12 RX ADMIN — CLOBETASOL PROPIONATE SCH APPLICFUL: 0.5 CREAM TOPICAL at 10:57

## 2017-01-12 RX ADMIN — DEXTRAN 70, HYPROMELLOSE 2910 SCH EACH: 3; 1 SOLUTION/ DROPS OPHTHALMIC at 18:45

## 2017-01-12 RX ADMIN — Medication SCH MG: at 18:46

## 2017-01-12 RX ADMIN — Medication SCH MG: at 11:04

## 2017-01-12 RX ADMIN — Medication SCH EACH: at 06:10

## 2017-01-12 RX ADMIN — Medication SCH EACH: at 11:04

## 2017-01-12 RX ADMIN — MAGNESIUM OXIDE TAB 400 MG (241.3 MG ELEMENTAL MG) SCH TAB: 400 (241.3 MG) TAB at 18:45

## 2017-01-12 RX ADMIN — Medication SCH EACH: at 18:44

## 2017-01-13 RX ADMIN — MAGNESIUM OXIDE TAB 400 MG (241.3 MG ELEMENTAL MG) SCH TAB: 400 (241.3 MG) TAB at 18:47

## 2017-01-13 RX ADMIN — CLOBETASOL PROPIONATE SCH APPLICFUL: 0.5 CREAM TOPICAL at 09:30

## 2017-01-13 RX ADMIN — DEXTRAN 70, HYPROMELLOSE 2910 SCH EACH: 3; 1 SOLUTION/ DROPS OPHTHALMIC at 09:30

## 2017-01-13 RX ADMIN — Medication SCH UNIT: at 09:28

## 2017-01-13 RX ADMIN — Medication SCH EACH: at 18:48

## 2017-01-13 RX ADMIN — Medication SCH MG: at 18:48

## 2017-01-13 RX ADMIN — Medication SCH MG: at 09:28

## 2017-01-13 RX ADMIN — Medication SCH EACH: at 06:40

## 2017-01-13 RX ADMIN — Medication SCH EACH: at 23:02

## 2017-01-13 RX ADMIN — DEXTRAN 70, HYPROMELLOSE 2910 SCH EACH: 3; 1 SOLUTION/ DROPS OPHTHALMIC at 18:47

## 2017-01-13 RX ADMIN — Medication SCH EACH: at 09:29

## 2017-01-13 RX ADMIN — Medication SCH EACH: at 18:47

## 2017-01-14 RX ADMIN — Medication SCH EACH: at 09:51

## 2017-01-14 RX ADMIN — DEXTRAN 70, HYPROMELLOSE 2910 SCH EACH: 3; 1 SOLUTION/ DROPS OPHTHALMIC at 09:51

## 2017-01-14 RX ADMIN — Medication SCH EACH: at 18:25

## 2017-01-14 RX ADMIN — DEXTRAN 70, HYPROMELLOSE 2910 SCH EACH: 3; 1 SOLUTION/ DROPS OPHTHALMIC at 18:26

## 2017-01-14 RX ADMIN — Medication SCH MG: at 09:52

## 2017-01-14 RX ADMIN — Medication SCH MG: at 18:26

## 2017-01-14 RX ADMIN — MAGNESIUM OXIDE TAB 400 MG (241.3 MG ELEMENTAL MG) SCH TAB: 400 (241.3 MG) TAB at 18:26

## 2017-01-14 RX ADMIN — CLOBETASOL PROPIONATE SCH APPLICFUL: 0.5 CREAM TOPICAL at 09:52

## 2017-01-14 RX ADMIN — Medication SCH UNIT: at 09:52

## 2017-01-14 RX ADMIN — Medication SCH EACH: at 18:26

## 2017-01-15 RX ADMIN — Medication SCH EACH: at 09:36

## 2017-01-15 RX ADMIN — Medication SCH MG: at 09:30

## 2017-01-15 RX ADMIN — Medication SCH MG: at 18:06

## 2017-01-15 RX ADMIN — Medication SCH EACH: at 06:35

## 2017-01-15 RX ADMIN — Medication SCH EACH: at 18:05

## 2017-01-15 RX ADMIN — MAGNESIUM OXIDE TAB 400 MG (241.3 MG ELEMENTAL MG) SCH TAB: 400 (241.3 MG) TAB at 18:06

## 2017-01-15 RX ADMIN — Medication SCH EACH: at 09:30

## 2017-01-15 RX ADMIN — CLOBETASOL PROPIONATE SCH APPLICFUL: 0.5 CREAM TOPICAL at 09:36

## 2017-01-15 RX ADMIN — DEXTRAN 70, HYPROMELLOSE 2910 SCH EACH: 3; 1 SOLUTION/ DROPS OPHTHALMIC at 18:06

## 2017-01-15 RX ADMIN — DEXTRAN 70, HYPROMELLOSE 2910 SCH EACH: 3; 1 SOLUTION/ DROPS OPHTHALMIC at 09:30

## 2017-01-15 RX ADMIN — Medication SCH UNIT: at 09:34

## 2017-01-16 RX ADMIN — Medication SCH EACH: at 09:54

## 2017-01-16 RX ADMIN — Medication SCH EACH: at 23:04

## 2017-01-16 RX ADMIN — MAGNESIUM OXIDE TAB 400 MG (241.3 MG ELEMENTAL MG) SCH TAB: 400 (241.3 MG) TAB at 18:22

## 2017-01-16 RX ADMIN — Medication SCH EACH: at 18:21

## 2017-01-16 RX ADMIN — Medication PRN APPLIC: at 23:05

## 2017-01-16 RX ADMIN — Medication SCH UNIT: at 09:49

## 2017-01-16 RX ADMIN — DEXTRAN 70, HYPROMELLOSE 2910 SCH EACH: 3; 1 SOLUTION/ DROPS OPHTHALMIC at 18:23

## 2017-01-16 RX ADMIN — Medication SCH MG: at 18:22

## 2017-01-16 RX ADMIN — Medication SCH MG: at 18:23

## 2017-01-16 RX ADMIN — CLOBETASOL PROPIONATE SCH APPLICFUL: 0.5 CREAM TOPICAL at 09:56

## 2017-01-16 RX ADMIN — Medication SCH MG: at 09:50

## 2017-01-16 RX ADMIN — DEXTRAN 70, HYPROMELLOSE 2910 SCH EACH: 3; 1 SOLUTION/ DROPS OPHTHALMIC at 09:51

## 2017-01-16 RX ADMIN — Medication SCH EACH: at 06:53

## 2017-01-17 RX ADMIN — Medication SCH EACH: at 05:20

## 2017-01-17 RX ADMIN — DEXTRAN 70, HYPROMELLOSE 2910 SCH EACH: 3; 1 SOLUTION/ DROPS OPHTHALMIC at 18:18

## 2017-01-17 RX ADMIN — MAGNESIUM OXIDE TAB 400 MG (241.3 MG ELEMENTAL MG) SCH TAB: 400 (241.3 MG) TAB at 18:18

## 2017-01-17 RX ADMIN — Medication SCH: at 06:32

## 2017-01-17 RX ADMIN — Medication SCH EACH: at 18:17

## 2017-01-17 RX ADMIN — Medication SCH EACH: at 18:16

## 2017-01-17 RX ADMIN — Medication SCH EACH: at 10:04

## 2017-01-17 RX ADMIN — CLOBETASOL PROPIONATE SCH APPLICFUL: 0.5 CREAM TOPICAL at 10:06

## 2017-01-17 RX ADMIN — Medication SCH MG: at 18:17

## 2017-01-17 RX ADMIN — Medication SCH MG: at 10:03

## 2017-01-17 RX ADMIN — Medication SCH UNIT: at 10:05

## 2017-01-17 RX ADMIN — DEXTRAN 70, HYPROMELLOSE 2910 SCH EACH: 3; 1 SOLUTION/ DROPS OPHTHALMIC at 10:06

## 2017-01-18 RX ADMIN — Medication SCH: at 06:26

## 2017-01-18 RX ADMIN — Medication SCH EACH: at 23:14

## 2017-01-18 RX ADMIN — Medication SCH MG: at 10:33

## 2017-01-18 RX ADMIN — Medication SCH EACH: at 10:34

## 2017-01-18 RX ADMIN — Medication SCH MG: at 18:15

## 2017-01-18 RX ADMIN — CLOBETASOL PROPIONATE SCH APPLICFUL: 0.5 CREAM TOPICAL at 10:34

## 2017-01-18 RX ADMIN — MAGNESIUM OXIDE TAB 400 MG (241.3 MG ELEMENTAL MG) SCH TAB: 400 (241.3 MG) TAB at 18:15

## 2017-01-18 RX ADMIN — Medication SCH UNIT: at 10:33

## 2017-01-18 RX ADMIN — Medication SCH EACH: at 10:35

## 2017-01-18 RX ADMIN — Medication SCH EACH: at 18:14

## 2017-01-18 RX ADMIN — DEXTRAN 70, HYPROMELLOSE 2910 SCH EACH: 3; 1 SOLUTION/ DROPS OPHTHALMIC at 10:34

## 2017-01-18 RX ADMIN — Medication SCH EACH: at 05:26

## 2017-01-18 RX ADMIN — DEXTRAN 70, HYPROMELLOSE 2910 SCH EACH: 3; 1 SOLUTION/ DROPS OPHTHALMIC at 18:15

## 2017-01-19 RX ADMIN — Medication SCH MG: at 10:23

## 2017-01-19 RX ADMIN — Medication SCH EACH: at 18:00

## 2017-01-19 RX ADMIN — Medication SCH MG: at 18:00

## 2017-01-19 RX ADMIN — DEXTRAN 70, HYPROMELLOSE 2910 SCH EACH: 3; 1 SOLUTION/ DROPS OPHTHALMIC at 18:01

## 2017-01-19 RX ADMIN — MAGNESIUM OXIDE TAB 400 MG (241.3 MG ELEMENTAL MG) SCH TAB: 400 (241.3 MG) TAB at 18:00

## 2017-01-19 RX ADMIN — Medication SCH UNIT: at 10:23

## 2017-01-19 RX ADMIN — Medication SCH EACH: at 10:23

## 2017-01-19 RX ADMIN — CLOBETASOL PROPIONATE SCH APPLICFUL: 0.5 CREAM TOPICAL at 10:25

## 2017-01-19 RX ADMIN — Medication SCH EACH: at 06:07

## 2017-01-19 RX ADMIN — DEXTRAN 70, HYPROMELLOSE 2910 SCH EACH: 3; 1 SOLUTION/ DROPS OPHTHALMIC at 10:24

## 2017-01-20 RX ADMIN — Medication SCH EACH: at 06:26

## 2017-01-20 RX ADMIN — Medication SCH EACH: at 18:17

## 2017-01-20 RX ADMIN — Medication SCH MG: at 10:06

## 2017-01-20 RX ADMIN — MAGNESIUM OXIDE TAB 400 MG (241.3 MG ELEMENTAL MG) SCH TAB: 400 (241.3 MG) TAB at 18:20

## 2017-01-20 RX ADMIN — Medication SCH MG: at 18:19

## 2017-01-20 RX ADMIN — DEXTRAN 70, HYPROMELLOSE 2910 SCH EACH: 3; 1 SOLUTION/ DROPS OPHTHALMIC at 10:05

## 2017-01-20 RX ADMIN — DEXTRAN 70, HYPROMELLOSE 2910 SCH EACH: 3; 1 SOLUTION/ DROPS OPHTHALMIC at 18:20

## 2017-01-20 RX ADMIN — Medication SCH UNIT: at 10:02

## 2017-01-20 RX ADMIN — Medication SCH EACH: at 23:12

## 2017-01-20 RX ADMIN — CLOBETASOL PROPIONATE SCH APPLICFUL: 0.5 CREAM TOPICAL at 10:03

## 2017-01-20 RX ADMIN — Medication SCH EACH: at 10:04

## 2017-01-20 RX ADMIN — Medication SCH EACH: at 18:18

## 2017-01-20 RX ADMIN — Medication SCH MG: at 18:21

## 2017-01-21 RX ADMIN — Medication SCH MG: at 18:52

## 2017-01-21 RX ADMIN — Medication SCH MG: at 18:53

## 2017-01-21 RX ADMIN — DEXTRAN 70, HYPROMELLOSE 2910 SCH EACH: 3; 1 SOLUTION/ DROPS OPHTHALMIC at 10:05

## 2017-01-21 RX ADMIN — CLOBETASOL PROPIONATE SCH APPLICFUL: 0.5 CREAM TOPICAL at 10:00

## 2017-01-21 RX ADMIN — Medication SCH EACH: at 10:03

## 2017-01-21 RX ADMIN — MAGNESIUM OXIDE TAB 400 MG (241.3 MG ELEMENTAL MG) SCH TAB: 400 (241.3 MG) TAB at 18:52

## 2017-01-21 RX ADMIN — Medication SCH EACH: at 18:52

## 2017-01-21 RX ADMIN — Medication SCH UNIT: at 09:58

## 2017-01-21 RX ADMIN — Medication SCH MG: at 10:02

## 2017-01-21 RX ADMIN — Medication SCH EACH: at 10:01

## 2017-01-21 RX ADMIN — DEXTRAN 70, HYPROMELLOSE 2910 SCH EACH: 3; 1 SOLUTION/ DROPS OPHTHALMIC at 18:53

## 2017-01-22 RX ADMIN — Medication SCH MG: at 10:15

## 2017-01-22 RX ADMIN — Medication SCH EACH: at 07:03

## 2017-01-22 RX ADMIN — DEXTRAN 70, HYPROMELLOSE 2910 SCH EACH: 3; 1 SOLUTION/ DROPS OPHTHALMIC at 18:58

## 2017-01-22 RX ADMIN — Medication SCH MG: at 18:58

## 2017-01-22 RX ADMIN — Medication SCH EACH: at 18:55

## 2017-01-22 RX ADMIN — Medication SCH UNIT: at 10:12

## 2017-01-22 RX ADMIN — DEXTRAN 70, HYPROMELLOSE 2910 SCH EACH: 3; 1 SOLUTION/ DROPS OPHTHALMIC at 10:14

## 2017-01-22 RX ADMIN — CLOBETASOL PROPIONATE SCH APPLICFUL: 0.5 CREAM TOPICAL at 10:13

## 2017-01-22 RX ADMIN — MAGNESIUM OXIDE TAB 400 MG (241.3 MG ELEMENTAL MG) SCH TAB: 400 (241.3 MG) TAB at 18:58

## 2017-01-22 RX ADMIN — Medication SCH MG: at 18:57

## 2017-01-22 RX ADMIN — Medication SCH EACH: at 10:14

## 2017-01-23 RX ADMIN — Medication SCH EACH: at 23:09

## 2017-01-23 RX ADMIN — CLOBETASOL PROPIONATE SCH APPLICFUL: 0.5 CREAM TOPICAL at 10:03

## 2017-01-23 RX ADMIN — Medication SCH MG: at 18:12

## 2017-01-23 RX ADMIN — Medication SCH EACH: at 06:01

## 2017-01-23 RX ADMIN — Medication SCH EACH: at 18:13

## 2017-01-23 RX ADMIN — Medication SCH UNIT: at 10:03

## 2017-01-23 RX ADMIN — Medication SCH EACH: at 18:11

## 2017-01-23 RX ADMIN — Medication SCH MG: at 18:13

## 2017-01-23 RX ADMIN — Medication SCH MG: at 10:02

## 2017-01-23 RX ADMIN — DEXTRAN 70, HYPROMELLOSE 2910 SCH EACH: 3; 1 SOLUTION/ DROPS OPHTHALMIC at 18:13

## 2017-01-23 RX ADMIN — Medication SCH EACH: at 10:00

## 2017-01-23 RX ADMIN — DEXTRAN 70, HYPROMELLOSE 2910 SCH EACH: 3; 1 SOLUTION/ DROPS OPHTHALMIC at 10:02

## 2017-01-23 RX ADMIN — MAGNESIUM OXIDE TAB 400 MG (241.3 MG ELEMENTAL MG) SCH TAB: 400 (241.3 MG) TAB at 18:12

## 2017-01-24 RX ADMIN — DEXTRAN 70, HYPROMELLOSE 2910 SCH EACH: 3; 1 SOLUTION/ DROPS OPHTHALMIC at 09:47

## 2017-01-24 RX ADMIN — Medication SCH: at 18:30

## 2017-01-24 RX ADMIN — Medication SCH EACH: at 09:45

## 2017-01-24 RX ADMIN — MAGNESIUM OXIDE TAB 400 MG (241.3 MG ELEMENTAL MG) SCH: 400 (241.3 MG) TAB at 18:31

## 2017-01-24 RX ADMIN — CLOBETASOL PROPIONATE SCH APPLICFUL: 0.5 CREAM TOPICAL at 09:47

## 2017-01-24 RX ADMIN — DEXTRAN 70, HYPROMELLOSE 2910 SCH: 3; 1 SOLUTION/ DROPS OPHTHALMIC at 18:31

## 2017-01-24 RX ADMIN — Medication SCH EACH: at 06:01

## 2017-01-24 RX ADMIN — Medication SCH: at 18:31

## 2017-01-24 RX ADMIN — Medication SCH MG: at 09:44

## 2017-01-24 RX ADMIN — Medication SCH UNIT: at 09:45

## 2017-01-24 RX ADMIN — Medication SCH EACH: at 09:49

## 2017-01-25 RX ADMIN — MAGNESIUM OXIDE TAB 400 MG (241.3 MG ELEMENTAL MG) SCH TAB: 400 (241.3 MG) TAB at 18:03

## 2017-01-25 RX ADMIN — DEXTRAN 70, HYPROMELLOSE 2910 SCH EACH: 3; 1 SOLUTION/ DROPS OPHTHALMIC at 18:04

## 2017-01-25 RX ADMIN — Medication SCH MG: at 18:03

## 2017-01-25 RX ADMIN — Medication SCH MG: at 18:02

## 2017-01-25 RX ADMIN — Medication SCH EACH: at 09:54

## 2017-01-25 RX ADMIN — Medication SCH EACH: at 05:52

## 2017-01-25 RX ADMIN — CLOBETASOL PROPIONATE SCH APPLICFUL: 0.5 CREAM TOPICAL at 09:54

## 2017-01-25 RX ADMIN — Medication SCH MG: at 09:53

## 2017-01-25 RX ADMIN — DEXTRAN 70, HYPROMELLOSE 2910 SCH EACH: 3; 1 SOLUTION/ DROPS OPHTHALMIC at 09:54

## 2017-01-25 RX ADMIN — Medication SCH UNIT: at 09:53

## 2017-01-25 RX ADMIN — Medication SCH: at 06:28

## 2017-01-25 RX ADMIN — Medication SCH EACH: at 18:03

## 2017-01-26 RX ADMIN — Medication SCH EACH: at 18:39

## 2017-01-26 RX ADMIN — Medication SCH EACH: at 18:40

## 2017-01-26 RX ADMIN — Medication SCH EACH: at 09:48

## 2017-01-26 RX ADMIN — Medication SCH EACH: at 06:21

## 2017-01-26 RX ADMIN — DEXTRAN 70, HYPROMELLOSE 2910 SCH EACH: 3; 1 SOLUTION/ DROPS OPHTHALMIC at 18:41

## 2017-01-26 RX ADMIN — CLOBETASOL PROPIONATE SCH APPLICFUL: 0.5 CREAM TOPICAL at 09:46

## 2017-01-26 RX ADMIN — Medication SCH MG: at 18:42

## 2017-01-26 RX ADMIN — Medication SCH EACH: at 01:18

## 2017-01-26 RX ADMIN — DEXTRAN 70, HYPROMELLOSE 2910 SCH EACH: 3; 1 SOLUTION/ DROPS OPHTHALMIC at 09:48

## 2017-01-26 RX ADMIN — Medication SCH MG: at 09:49

## 2017-01-26 RX ADMIN — MAGNESIUM OXIDE TAB 400 MG (241.3 MG ELEMENTAL MG) SCH TAB: 400 (241.3 MG) TAB at 18:41

## 2017-01-26 RX ADMIN — Medication SCH MG: at 18:40

## 2017-01-26 RX ADMIN — Medication SCH UNIT: at 09:47

## 2017-01-27 RX ADMIN — Medication SCH MG: at 18:03

## 2017-01-27 RX ADMIN — MAGNESIUM OXIDE TAB 400 MG (241.3 MG ELEMENTAL MG) SCH TAB: 400 (241.3 MG) TAB at 18:02

## 2017-01-27 RX ADMIN — Medication SCH EACH: at 22:42

## 2017-01-27 RX ADMIN — CLOBETASOL PROPIONATE SCH APPLICFUL: 0.5 CREAM TOPICAL at 10:18

## 2017-01-27 RX ADMIN — Medication SCH EACH: at 10:18

## 2017-01-27 RX ADMIN — Medication SCH EACH: at 10:28

## 2017-01-27 RX ADMIN — Medication SCH EACH: at 06:14

## 2017-01-27 RX ADMIN — Medication SCH MG: at 10:29

## 2017-01-27 RX ADMIN — Medication SCH UNIT: at 10:28

## 2017-01-27 RX ADMIN — DEXTRAN 70, HYPROMELLOSE 2910 SCH EACH: 3; 1 SOLUTION/ DROPS OPHTHALMIC at 18:04

## 2017-01-27 RX ADMIN — Medication SCH MG: at 18:04

## 2017-01-27 RX ADMIN — Medication SCH EACH: at 18:02

## 2017-01-27 RX ADMIN — DEXTRAN 70, HYPROMELLOSE 2910 SCH EACH: 3; 1 SOLUTION/ DROPS OPHTHALMIC at 10:30

## 2017-01-28 RX ADMIN — Medication SCH EACH: at 18:19

## 2017-01-28 RX ADMIN — Medication SCH MG: at 18:18

## 2017-01-28 RX ADMIN — DEXTRAN 70, HYPROMELLOSE 2910 SCH EACH: 3; 1 SOLUTION/ DROPS OPHTHALMIC at 10:07

## 2017-01-28 RX ADMIN — MAGNESIUM OXIDE TAB 400 MG (241.3 MG ELEMENTAL MG) SCH TAB: 400 (241.3 MG) TAB at 18:19

## 2017-01-28 RX ADMIN — Medication SCH MG: at 10:07

## 2017-01-28 RX ADMIN — Medication SCH UNIT: at 10:04

## 2017-01-28 RX ADMIN — Medication SCH EACH: at 10:04

## 2017-01-28 RX ADMIN — CLOBETASOL PROPIONATE SCH APPLICFUL: 0.5 CREAM TOPICAL at 10:08

## 2017-01-28 RX ADMIN — DEXTRAN 70, HYPROMELLOSE 2910 SCH EACH: 3; 1 SOLUTION/ DROPS OPHTHALMIC at 18:21

## 2017-01-28 RX ADMIN — Medication SCH MG: at 18:19

## 2017-01-28 RX ADMIN — Medication SCH EACH: at 18:20

## 2017-01-29 RX ADMIN — DEXTRAN 70, HYPROMELLOSE 2910 SCH EACH: 3; 1 SOLUTION/ DROPS OPHTHALMIC at 10:20

## 2017-01-29 RX ADMIN — Medication SCH EACH: at 18:31

## 2017-01-29 RX ADMIN — MAGNESIUM OXIDE TAB 400 MG (241.3 MG ELEMENTAL MG) SCH TAB: 400 (241.3 MG) TAB at 18:33

## 2017-01-29 RX ADMIN — DEXTRAN 70, HYPROMELLOSE 2910 SCH EACH: 3; 1 SOLUTION/ DROPS OPHTHALMIC at 18:33

## 2017-01-29 RX ADMIN — Medication SCH EACH: at 06:21

## 2017-01-29 RX ADMIN — Medication SCH MG: at 10:19

## 2017-01-29 RX ADMIN — Medication SCH EACH: at 10:21

## 2017-01-29 RX ADMIN — Medication SCH EACH: at 18:32

## 2017-01-29 RX ADMIN — CLOBETASOL PROPIONATE SCH APPLICFUL: 0.5 CREAM TOPICAL at 10:21

## 2017-01-29 RX ADMIN — Medication SCH MG: at 18:33

## 2017-01-29 RX ADMIN — Medication SCH UNIT: at 10:18

## 2017-01-30 RX ADMIN — Medication SCH EACH: at 09:30

## 2017-01-30 RX ADMIN — Medication SCH EACH: at 18:11

## 2017-01-30 RX ADMIN — Medication SCH EACH: at 09:32

## 2017-01-30 RX ADMIN — Medication SCH UNIT: at 09:30

## 2017-01-30 RX ADMIN — CLOBETASOL PROPIONATE SCH APPLICFUL: 0.5 CREAM TOPICAL at 09:31

## 2017-01-30 RX ADMIN — Medication SCH MG: at 18:11

## 2017-01-30 RX ADMIN — Medication SCH MG: at 09:31

## 2017-01-30 RX ADMIN — Medication SCH EACH: at 06:01

## 2017-01-30 RX ADMIN — Medication SCH EACH: at 18:12

## 2017-01-30 RX ADMIN — MAGNESIUM OXIDE TAB 400 MG (241.3 MG ELEMENTAL MG) SCH TAB: 400 (241.3 MG) TAB at 18:11

## 2017-01-30 RX ADMIN — DEXTRAN 70, HYPROMELLOSE 2910 SCH EACH: 3; 1 SOLUTION/ DROPS OPHTHALMIC at 09:30

## 2017-01-30 RX ADMIN — DEXTRAN 70, HYPROMELLOSE 2910 SCH EACH: 3; 1 SOLUTION/ DROPS OPHTHALMIC at 18:11

## 2017-01-30 RX ADMIN — Medication SCH MG: at 18:12

## 2017-01-31 RX ADMIN — Medication SCH EACH: at 18:14

## 2017-01-31 RX ADMIN — CLOBETASOL PROPIONATE SCH APPLICFUL: 0.5 CREAM TOPICAL at 09:39

## 2017-01-31 RX ADMIN — Medication SCH EACH: at 06:22

## 2017-01-31 RX ADMIN — Medication SCH EACH: at 09:39

## 2017-01-31 RX ADMIN — Medication SCH EACH: at 18:15

## 2017-01-31 RX ADMIN — Medication SCH MG: at 09:40

## 2017-01-31 RX ADMIN — DEXTRAN 70, HYPROMELLOSE 2910 SCH EACH: 3; 1 SOLUTION/ DROPS OPHTHALMIC at 18:14

## 2017-01-31 RX ADMIN — MAGNESIUM OXIDE TAB 400 MG (241.3 MG ELEMENTAL MG) SCH TAB: 400 (241.3 MG) TAB at 18:15

## 2017-01-31 RX ADMIN — Medication SCH MG: at 18:15

## 2017-01-31 RX ADMIN — DEXTRAN 70, HYPROMELLOSE 2910 SCH EACH: 3; 1 SOLUTION/ DROPS OPHTHALMIC at 09:39

## 2017-01-31 RX ADMIN — Medication SCH UNIT: at 09:40

## 2017-01-31 RX ADMIN — Medication SCH EACH: at 06:21

## 2017-02-01 RX ADMIN — Medication SCH EACH: at 18:52

## 2017-02-01 RX ADMIN — Medication SCH MG: at 18:53

## 2017-02-01 RX ADMIN — DEXTRAN 70, HYPROMELLOSE 2910 SCH EACH: 3; 1 SOLUTION/ DROPS OPHTHALMIC at 09:55

## 2017-02-01 RX ADMIN — Medication SCH UNIT: at 09:41

## 2017-02-01 RX ADMIN — CLOBETASOL PROPIONATE SCH APPLICFUL: 0.5 CREAM TOPICAL at 09:42

## 2017-02-01 RX ADMIN — Medication SCH EACH: at 09:43

## 2017-02-01 RX ADMIN — Medication SCH EACH: at 18:51

## 2017-02-01 RX ADMIN — Medication SCH EACH: at 06:19

## 2017-02-01 RX ADMIN — DEXTRAN 70, HYPROMELLOSE 2910 SCH EACH: 3; 1 SOLUTION/ DROPS OPHTHALMIC at 18:53

## 2017-02-01 RX ADMIN — MAGNESIUM OXIDE TAB 400 MG (241.3 MG ELEMENTAL MG) SCH TAB: 400 (241.3 MG) TAB at 18:53

## 2017-02-01 RX ADMIN — Medication SCH MG: at 09:44

## 2017-02-01 RX ADMIN — Medication SCH MG: at 18:52

## 2017-02-02 RX ADMIN — CLOBETASOL PROPIONATE SCH APPLICFUL: 0.5 CREAM TOPICAL at 09:40

## 2017-02-02 RX ADMIN — Medication SCH MG: at 18:26

## 2017-02-02 RX ADMIN — Medication SCH EACH: at 06:04

## 2017-02-02 RX ADMIN — Medication SCH EACH: at 18:22

## 2017-02-02 RX ADMIN — Medication SCH MG: at 09:43

## 2017-02-02 RX ADMIN — DEXTRAN 70, HYPROMELLOSE 2910 SCH EACH: 3; 1 SOLUTION/ DROPS OPHTHALMIC at 09:43

## 2017-02-02 RX ADMIN — Medication SCH EACH: at 09:41

## 2017-02-02 RX ADMIN — CLOBETASOL PROPIONATE SCH: 0.5 CREAM TOPICAL at 10:26

## 2017-02-02 RX ADMIN — Medication SCH MG: at 18:24

## 2017-02-02 RX ADMIN — MAGNESIUM OXIDE TAB 400 MG (241.3 MG ELEMENTAL MG) SCH TAB: 400 (241.3 MG) TAB at 18:26

## 2017-02-02 RX ADMIN — Medication SCH UNIT: at 09:40

## 2017-02-02 RX ADMIN — DEXTRAN 70, HYPROMELLOSE 2910 SCH EACH: 3; 1 SOLUTION/ DROPS OPHTHALMIC at 18:25

## 2017-02-02 RX ADMIN — ANORECTAL OINTMENT SCH APPLIC: 15.7; .44; 24; 20.6 OINTMENT TOPICAL at 10:27

## 2017-02-02 RX ADMIN — ANORECTAL OINTMENT SCH APPLIC: 15.7; .44; 24; 20.6 OINTMENT TOPICAL at 23:14

## 2017-02-02 RX ADMIN — Medication SCH: at 10:27

## 2017-02-02 RX ADMIN — Medication SCH EACH: at 18:23

## 2017-02-03 RX ADMIN — Medication SCH EACH: at 18:38

## 2017-02-03 RX ADMIN — Medication SCH UNIT: at 09:15

## 2017-02-03 RX ADMIN — Medication SCH EACH: at 06:04

## 2017-02-03 RX ADMIN — Medication SCH MG: at 18:39

## 2017-02-03 RX ADMIN — Medication SCH EACH: at 09:15

## 2017-02-03 RX ADMIN — ANORECTAL OINTMENT SCH APPLIC: 15.7; .44; 24; 20.6 OINTMENT TOPICAL at 22:54

## 2017-02-03 RX ADMIN — DEXTRAN 70, HYPROMELLOSE 2910 SCH EACH: 3; 1 SOLUTION/ DROPS OPHTHALMIC at 18:39

## 2017-02-03 RX ADMIN — DEXTRAN 70, HYPROMELLOSE 2910 SCH EACH: 3; 1 SOLUTION/ DROPS OPHTHALMIC at 09:16

## 2017-02-03 RX ADMIN — Medication SCH MG: at 09:15

## 2017-02-03 RX ADMIN — Medication SCH EACH: at 18:39

## 2017-02-03 RX ADMIN — MAGNESIUM OXIDE TAB 400 MG (241.3 MG ELEMENTAL MG) SCH TAB: 400 (241.3 MG) TAB at 18:39

## 2017-02-03 RX ADMIN — ANORECTAL OINTMENT SCH APPLIC: 15.7; .44; 24; 20.6 OINTMENT TOPICAL at 09:16

## 2017-02-04 RX ADMIN — Medication SCH UNIT: at 09:14

## 2017-02-04 RX ADMIN — ANORECTAL OINTMENT SCH APPLIC: 15.7; .44; 24; 20.6 OINTMENT TOPICAL at 19:04

## 2017-02-04 RX ADMIN — ANORECTAL OINTMENT SCH APPLIC: 15.7; .44; 24; 20.6 OINTMENT TOPICAL at 09:14

## 2017-02-04 RX ADMIN — ANORECTAL OINTMENT SCH APPLIC: 15.7; .44; 24; 20.6 OINTMENT TOPICAL at 23:23

## 2017-02-04 RX ADMIN — MAGNESIUM OXIDE TAB 400 MG (241.3 MG ELEMENTAL MG) SCH TAB: 400 (241.3 MG) TAB at 19:02

## 2017-02-04 RX ADMIN — Medication SCH MG: at 19:03

## 2017-02-04 RX ADMIN — Medication SCH EACH: at 09:13

## 2017-02-04 RX ADMIN — DEXTRAN 70, HYPROMELLOSE 2910 SCH EACH: 3; 1 SOLUTION/ DROPS OPHTHALMIC at 09:13

## 2017-02-04 RX ADMIN — Medication SCH MG: at 19:02

## 2017-02-04 RX ADMIN — Medication SCH MG: at 09:13

## 2017-02-04 RX ADMIN — Medication SCH EACH: at 19:03

## 2017-02-04 RX ADMIN — DEXTRAN 70, HYPROMELLOSE 2910 SCH EACH: 3; 1 SOLUTION/ DROPS OPHTHALMIC at 19:04

## 2017-02-05 RX ADMIN — Medication SCH MG: at 18:10

## 2017-02-05 RX ADMIN — DEXTRAN 70, HYPROMELLOSE 2910 SCH: 3; 1 SOLUTION/ DROPS OPHTHALMIC at 09:35

## 2017-02-05 RX ADMIN — MAGNESIUM OXIDE TAB 400 MG (241.3 MG ELEMENTAL MG) SCH TAB: 400 (241.3 MG) TAB at 18:09

## 2017-02-05 RX ADMIN — Medication SCH MG: at 18:09

## 2017-02-05 RX ADMIN — ANORECTAL OINTMENT SCH APPLIC: 15.7; .44; 24; 20.6 OINTMENT TOPICAL at 09:34

## 2017-02-05 RX ADMIN — DEXTRAN 70, HYPROMELLOSE 2910 SCH EACH: 3; 1 SOLUTION/ DROPS OPHTHALMIC at 09:33

## 2017-02-05 RX ADMIN — ANORECTAL OINTMENT SCH APPLIC: 15.7; .44; 24; 20.6 OINTMENT TOPICAL at 23:00

## 2017-02-05 RX ADMIN — Medication SCH EACH: at 09:33

## 2017-02-05 RX ADMIN — DEXTRAN 70, HYPROMELLOSE 2910 SCH: 3; 1 SOLUTION/ DROPS OPHTHALMIC at 18:10

## 2017-02-05 RX ADMIN — Medication SCH EACH: at 18:09

## 2017-02-05 RX ADMIN — Medication SCH EACH: at 06:24

## 2017-02-05 RX ADMIN — Medication SCH UNIT: at 09:33

## 2017-02-05 RX ADMIN — Medication SCH MG: at 09:33

## 2017-02-06 RX ADMIN — Medication SCH UNIT: at 09:48

## 2017-02-06 RX ADMIN — Medication SCH MG: at 18:05

## 2017-02-06 RX ADMIN — Medication SCH EACH: at 18:06

## 2017-02-06 RX ADMIN — Medication SCH MG: at 09:48

## 2017-02-06 RX ADMIN — Medication SCH MG: at 18:06

## 2017-02-06 RX ADMIN — Medication SCH EACH: at 06:50

## 2017-02-06 RX ADMIN — DEXTRAN 70, HYPROMELLOSE 2910 SCH: 3; 1 SOLUTION/ DROPS OPHTHALMIC at 09:52

## 2017-02-06 RX ADMIN — ANORECTAL OINTMENT SCH APPLIC: 15.7; .44; 24; 20.6 OINTMENT TOPICAL at 09:51

## 2017-02-06 RX ADMIN — DEXTRAN 70, HYPROMELLOSE 2910 SCH EACH: 3; 1 SOLUTION/ DROPS OPHTHALMIC at 09:49

## 2017-02-06 RX ADMIN — Medication SCH EACH: at 09:47

## 2017-02-06 RX ADMIN — DEXTRAN 70, HYPROMELLOSE 2910 SCH: 3; 1 SOLUTION/ DROPS OPHTHALMIC at 18:07

## 2017-02-06 RX ADMIN — ANORECTAL OINTMENT SCH APPLIC: 15.7; .44; 24; 20.6 OINTMENT TOPICAL at 22:54

## 2017-02-06 RX ADMIN — MAGNESIUM OXIDE TAB 400 MG (241.3 MG ELEMENTAL MG) SCH TAB: 400 (241.3 MG) TAB at 18:06

## 2017-02-07 RX ADMIN — Medication SCH MG: at 18:31

## 2017-02-07 RX ADMIN — ANORECTAL OINTMENT SCH APPLIC: 15.7; .44; 24; 20.6 OINTMENT TOPICAL at 10:24

## 2017-02-07 RX ADMIN — ANORECTAL OINTMENT SCH APPLIC: 15.7; .44; 24; 20.6 OINTMENT TOPICAL at 22:36

## 2017-02-07 RX ADMIN — Medication SCH EACH: at 10:24

## 2017-02-07 RX ADMIN — DEXTRAN 70, HYPROMELLOSE 2910 SCH EACH: 3; 1 SOLUTION/ DROPS OPHTHALMIC at 10:26

## 2017-02-07 RX ADMIN — Medication SCH MG: at 18:32

## 2017-02-07 RX ADMIN — DEXTRAN 70, HYPROMELLOSE 2910 SCH: 3; 1 SOLUTION/ DROPS OPHTHALMIC at 18:33

## 2017-02-07 RX ADMIN — Medication SCH EACH: at 18:31

## 2017-02-07 RX ADMIN — Medication SCH MG: at 10:25

## 2017-02-07 RX ADMIN — Medication SCH EACH: at 06:24

## 2017-02-07 RX ADMIN — MAGNESIUM OXIDE TAB 400 MG (241.3 MG ELEMENTAL MG) SCH TAB: 400 (241.3 MG) TAB at 18:31

## 2017-02-07 RX ADMIN — Medication SCH UNIT: at 10:25

## 2017-02-08 RX ADMIN — Medication SCH MG: at 18:56

## 2017-02-08 RX ADMIN — Medication SCH MG: at 09:57

## 2017-02-08 RX ADMIN — Medication SCH EACH: at 18:56

## 2017-02-08 RX ADMIN — Medication SCH EACH: at 06:27

## 2017-02-08 RX ADMIN — Medication SCH EACH: at 09:56

## 2017-02-08 RX ADMIN — MAGNESIUM OXIDE TAB 400 MG (241.3 MG ELEMENTAL MG) SCH TAB: 400 (241.3 MG) TAB at 18:57

## 2017-02-08 RX ADMIN — Medication SCH UNIT: at 09:56

## 2017-02-08 RX ADMIN — DEXTRAN 70, HYPROMELLOSE 2910 SCH: 3; 1 SOLUTION/ DROPS OPHTHALMIC at 09:57

## 2017-02-08 RX ADMIN — ANORECTAL OINTMENT SCH APPLIC: 15.7; .44; 24; 20.6 OINTMENT TOPICAL at 22:22

## 2017-02-08 RX ADMIN — ANORECTAL OINTMENT SCH APPLIC: 15.7; .44; 24; 20.6 OINTMENT TOPICAL at 09:57

## 2017-02-09 RX ADMIN — Medication SCH MG: at 18:01

## 2017-02-09 RX ADMIN — Medication SCH EACH: at 09:41

## 2017-02-09 RX ADMIN — Medication SCH MG: at 09:40

## 2017-02-09 RX ADMIN — ANORECTAL OINTMENT SCH APPLIC: 15.7; .44; 24; 20.6 OINTMENT TOPICAL at 23:22

## 2017-02-09 RX ADMIN — MAGNESIUM OXIDE TAB 400 MG (241.3 MG ELEMENTAL MG) SCH TAB: 400 (241.3 MG) TAB at 18:01

## 2017-02-09 RX ADMIN — Medication SCH EACH: at 18:00

## 2017-02-09 RX ADMIN — Medication SCH UNIT: at 09:41

## 2017-02-09 RX ADMIN — Medication SCH EACH: at 18:01

## 2017-02-09 RX ADMIN — Medication SCH EACH: at 06:21

## 2017-02-09 RX ADMIN — ANORECTAL OINTMENT SCH APPLIC: 15.7; .44; 24; 20.6 OINTMENT TOPICAL at 09:41

## 2017-02-10 RX ADMIN — Medication SCH EACH: at 18:10

## 2017-02-10 RX ADMIN — Medication SCH UNIT: at 10:25

## 2017-02-10 RX ADMIN — ANORECTAL OINTMENT SCH APPLIC: 15.7; .44; 24; 20.6 OINTMENT TOPICAL at 10:22

## 2017-02-10 RX ADMIN — Medication SCH EACH: at 10:24

## 2017-02-10 RX ADMIN — Medication SCH MG: at 18:12

## 2017-02-10 RX ADMIN — Medication SCH MG: at 18:10

## 2017-02-10 RX ADMIN — Medication SCH MG: at 10:24

## 2017-02-10 RX ADMIN — MAGNESIUM OXIDE TAB 400 MG (241.3 MG ELEMENTAL MG) SCH TAB: 400 (241.3 MG) TAB at 18:11

## 2017-02-10 RX ADMIN — Medication SCH EACH: at 06:08

## 2017-02-11 RX ADMIN — ANORECTAL OINTMENT SCH APPLIC: 15.7; .44; 24; 20.6 OINTMENT TOPICAL at 23:10

## 2017-02-11 RX ADMIN — MAGNESIUM OXIDE TAB 400 MG (241.3 MG ELEMENTAL MG) SCH TAB: 400 (241.3 MG) TAB at 18:09

## 2017-02-11 RX ADMIN — ANORECTAL OINTMENT SCH APPLIC: 15.7; .44; 24; 20.6 OINTMENT TOPICAL at 05:46

## 2017-02-11 RX ADMIN — Medication SCH MG: at 18:09

## 2017-02-11 RX ADMIN — Medication SCH EACH: at 18:08

## 2017-02-11 RX ADMIN — ANORECTAL OINTMENT SCH APPLIC: 15.7; .44; 24; 20.6 OINTMENT TOPICAL at 09:42

## 2017-02-11 RX ADMIN — Medication SCH EACH: at 09:40

## 2017-02-11 RX ADMIN — Medication SCH MG: at 09:42

## 2017-02-11 RX ADMIN — Medication SCH MG: at 18:08

## 2017-02-11 RX ADMIN — Medication SCH UNIT: at 09:41

## 2017-02-12 RX ADMIN — Medication SCH MG: at 18:15

## 2017-02-12 RX ADMIN — MAGNESIUM OXIDE TAB 400 MG (241.3 MG ELEMENTAL MG) SCH TAB: 400 (241.3 MG) TAB at 18:16

## 2017-02-12 RX ADMIN — Medication SCH: at 06:24

## 2017-02-12 RX ADMIN — Medication SCH EACH: at 09:48

## 2017-02-12 RX ADMIN — Medication SCH MG: at 09:49

## 2017-02-12 RX ADMIN — Medication SCH EACH: at 18:15

## 2017-02-12 RX ADMIN — ANORECTAL OINTMENT SCH APPLIC: 15.7; .44; 24; 20.6 OINTMENT TOPICAL at 09:47

## 2017-02-12 RX ADMIN — Medication SCH EACH: at 04:36

## 2017-02-12 RX ADMIN — Medication SCH MG: at 18:16

## 2017-02-12 RX ADMIN — Medication SCH EACH: at 18:16

## 2017-02-12 RX ADMIN — Medication SCH UNIT: at 09:48

## 2017-02-12 RX ADMIN — ANORECTAL OINTMENT SCH APPLIC: 15.7; .44; 24; 20.6 OINTMENT TOPICAL at 23:09

## 2017-02-13 RX ADMIN — Medication SCH UNIT: at 09:46

## 2017-02-13 RX ADMIN — Medication SCH MG: at 18:05

## 2017-02-13 RX ADMIN — Medication SCH MG: at 09:46

## 2017-02-13 RX ADMIN — Medication SCH MG: at 18:04

## 2017-02-13 RX ADMIN — MAGNESIUM OXIDE TAB 400 MG (241.3 MG ELEMENTAL MG) SCH TAB: 400 (241.3 MG) TAB at 18:05

## 2017-02-13 RX ADMIN — ANORECTAL OINTMENT SCH APPLIC: 15.7; .44; 24; 20.6 OINTMENT TOPICAL at 09:46

## 2017-02-13 RX ADMIN — Medication SCH EACH: at 09:45

## 2017-02-13 RX ADMIN — Medication SCH EACH: at 18:03

## 2017-02-13 RX ADMIN — Medication SCH EACH: at 06:16

## 2017-02-14 RX ADMIN — Medication SCH EACH: at 06:22

## 2017-02-14 RX ADMIN — Medication SCH MG: at 09:32

## 2017-02-14 RX ADMIN — Medication SCH UNIT: at 09:31

## 2017-02-14 RX ADMIN — Medication SCH MG: at 18:10

## 2017-02-14 RX ADMIN — DEXTRAN 70, HYPROMELLOSE 2910 PRN EACH: 3; 1 SOLUTION/ DROPS OPHTHALMIC at 09:33

## 2017-02-14 RX ADMIN — Medication SCH EACH: at 09:32

## 2017-02-14 RX ADMIN — Medication SCH EACH: at 18:09

## 2017-02-14 RX ADMIN — ANORECTAL OINTMENT SCH APPLIC: 15.7; .44; 24; 20.6 OINTMENT TOPICAL at 02:00

## 2017-02-14 RX ADMIN — Medication SCH EACH: at 18:08

## 2017-02-14 RX ADMIN — MAGNESIUM OXIDE TAB 400 MG (241.3 MG ELEMENTAL MG) SCH TAB: 400 (241.3 MG) TAB at 18:10

## 2017-02-14 RX ADMIN — ANORECTAL OINTMENT SCH APPLIC: 15.7; .44; 24; 20.6 OINTMENT TOPICAL at 22:58

## 2017-02-14 RX ADMIN — ANORECTAL OINTMENT SCH APPLIC: 15.7; .44; 24; 20.6 OINTMENT TOPICAL at 09:33

## 2017-02-15 RX ADMIN — Medication SCH MG: at 09:16

## 2017-02-15 RX ADMIN — Medication SCH: at 08:05

## 2017-02-15 RX ADMIN — MAGNESIUM OXIDE TAB 400 MG (241.3 MG ELEMENTAL MG) SCH TAB: 400 (241.3 MG) TAB at 18:11

## 2017-02-15 RX ADMIN — Medication SCH MG: at 18:10

## 2017-02-15 RX ADMIN — ANORECTAL OINTMENT SCH APPLIC: 15.7; .44; 24; 20.6 OINTMENT TOPICAL at 23:42

## 2017-02-15 RX ADMIN — Medication SCH EACH: at 09:16

## 2017-02-15 RX ADMIN — ANORECTAL OINTMENT SCH APPLIC: 15.7; .44; 24; 20.6 OINTMENT TOPICAL at 09:18

## 2017-02-15 RX ADMIN — Medication SCH UNIT: at 09:16

## 2017-02-15 RX ADMIN — Medication SCH EACH: at 18:10

## 2017-02-15 RX ADMIN — Medication SCH EACH: at 05:56

## 2017-02-15 RX ADMIN — Medication SCH MG: at 18:11

## 2017-02-16 RX ADMIN — Medication SCH EACH: at 06:04

## 2017-02-16 RX ADMIN — ANORECTAL OINTMENT SCH APPLIC: 15.7; .44; 24; 20.6 OINTMENT TOPICAL at 10:09

## 2017-02-16 RX ADMIN — Medication SCH EACH: at 18:38

## 2017-02-16 RX ADMIN — Medication SCH MG: at 10:10

## 2017-02-16 RX ADMIN — Medication SCH MG: at 18:38

## 2017-02-16 RX ADMIN — Medication SCH EACH: at 10:09

## 2017-02-16 RX ADMIN — MAGNESIUM OXIDE TAB 400 MG (241.3 MG ELEMENTAL MG) SCH TAB: 400 (241.3 MG) TAB at 18:38

## 2017-02-16 RX ADMIN — Medication SCH EACH: at 18:36

## 2017-02-16 RX ADMIN — Medication SCH UNIT: at 10:10

## 2017-02-16 RX ADMIN — CLOBETASOL PROPIONATE SCH APPLIC: 0.5 CREAM TOPICAL at 18:42

## 2017-02-16 NOTE — PCM.SN
- Free Text/Narrative


Note: 





The nurse notified me that Medina had more redness and sores in the vaginal 

area.  I examined her and she does have red sores in the vagina and vulvar 

area.  I recommend clobetasol BID x 2 weeks and a visit with GYN again.  She is 

now using cotton panties and was getting barrier cream.  She denies 

incontinence.

## 2017-02-17 RX ADMIN — CLOBETASOL PROPIONATE SCH APPLIC: 0.5 CREAM TOPICAL at 23:10

## 2017-02-17 RX ADMIN — Medication SCH MG: at 18:01

## 2017-02-17 RX ADMIN — CLOBETASOL PROPIONATE SCH: 0.5 CREAM TOPICAL at 12:16

## 2017-02-17 RX ADMIN — Medication SCH EACH: at 18:00

## 2017-02-17 RX ADMIN — CLOTRIMAZOLE AND BETAMETHASONE DIPROPIONATE SCH APPLIC: 10; .5 CREAM TOPICAL at 23:10

## 2017-02-17 RX ADMIN — Medication SCH UNIT: at 09:23

## 2017-02-17 RX ADMIN — Medication SCH MG: at 18:00

## 2017-02-17 RX ADMIN — DEXTRAN 70, HYPROMELLOSE 2910 PRN EACH: 3; 1 SOLUTION/ DROPS OPHTHALMIC at 09:23

## 2017-02-17 RX ADMIN — CLOBETASOL PROPIONATE SCH APPLIC: 0.5 CREAM TOPICAL at 12:23

## 2017-02-17 RX ADMIN — MAGNESIUM OXIDE TAB 400 MG (241.3 MG ELEMENTAL MG) SCH TAB: 400 (241.3 MG) TAB at 18:01

## 2017-02-17 RX ADMIN — Medication SCH EACH: at 09:22

## 2017-02-17 RX ADMIN — Medication SCH EACH: at 18:01

## 2017-02-17 RX ADMIN — Medication SCH MG: at 09:23

## 2017-02-17 RX ADMIN — CLOTRIMAZOLE AND BETAMETHASONE DIPROPIONATE SCH APPLIC: 10; .5 CREAM TOPICAL at 12:23

## 2017-02-17 RX ADMIN — Medication SCH EACH: at 06:35

## 2017-02-17 NOTE — PN
Progress Note for JENIFER ERWIN  Date:  02/15/2017  Room #:

 

SUBJECTIVE:  An 87-year-old seen today who is on long-term swing bed for severe

osteoarthritis which affects her mobility and ability to do ADLs.  She has been

feeling great.  She has had no further episodes of weakness.  Her only concern

is irritation she has on her bottom, it has been about 2 weeks since it has

gotten worse.  She has been on numerous creams in the past.  She has been to

GYN.  Currently, she is just using Calmoseptine and she just switched over to a

cotton underwear.  She denies any problems with incontinence.  She feels like

the pain is more on the outside.  No discharge noted.  No fever, no chills.

 

OBJECTIVE:  Vital Signs:  She has a temperature of 97.4, pulse 60, blood

pressure was 148/71, respiratory rate 18, and O2 of 93% on room air.  Her weight

is 79 kg.

General:  She is in no acute distress.

Heart: Irregularly irregular with murmur.

Respiratory:  Lungs sounds are clear to auscultation without crackles or

wheezes.

Abdomen:  Positive bowel sounds.  Soft and nontender.

Extremities:  Warm and dry.  No edema.

Mental Status:  Alert and orientated x3.

 

ASSESSMENT:

1. Atrial fibrillation, rate controlled.  She is on Pradaxa.  We will continue

    with the same.

2. Hypothyroidism, treated.

3. Essential hypertension, controlled.

4. Atrophic vaginitis, symptomatic intermittently.

5. Osteoarthritis.

6. Mild thrombocytopenia.

 

PLAN:  At this point, we will continue swing bed cares.  She will probably need

lab work again in the next 2-3 months.  She, otherwise, we will continue with

local barrier cream cares to the vaginal vulvar area.  If things are not

improving over the next couple weeks, I will either take a look or have her see

GYN again.  We can consider restarting clobetasol as well.

 

 

MKA:  02/16/2017 08:35:31  MODL:  02/16/2017 08:50:37

Job #:  [id number]

## 2017-02-18 RX ADMIN — Medication SCH MG: at 18:00

## 2017-02-18 RX ADMIN — MAGNESIUM OXIDE TAB 400 MG (241.3 MG ELEMENTAL MG) SCH TAB: 400 (241.3 MG) TAB at 18:00

## 2017-02-18 RX ADMIN — Medication SCH MG: at 09:01

## 2017-02-18 RX ADMIN — Medication SCH EACH: at 09:01

## 2017-02-18 RX ADMIN — CLOTRIMAZOLE AND BETAMETHASONE DIPROPIONATE SCH APPLIC: 10; .5 CREAM TOPICAL at 23:09

## 2017-02-18 RX ADMIN — Medication SCH EACH: at 18:00

## 2017-02-18 RX ADMIN — DEXTRAN 70, HYPROMELLOSE 2910 PRN EACH: 3; 1 SOLUTION/ DROPS OPHTHALMIC at 09:01

## 2017-02-18 RX ADMIN — CLOBETASOL PROPIONATE SCH APPLIC: 0.5 CREAM TOPICAL at 08:59

## 2017-02-18 RX ADMIN — CLOTRIMAZOLE AND BETAMETHASONE DIPROPIONATE SCH APPLIC: 10; .5 CREAM TOPICAL at 08:59

## 2017-02-18 RX ADMIN — CLOBETASOL PROPIONATE SCH APPLIC: 0.5 CREAM TOPICAL at 23:09

## 2017-02-18 RX ADMIN — Medication SCH MG: at 18:01

## 2017-02-18 RX ADMIN — Medication SCH UNIT: at 09:02

## 2017-02-19 RX ADMIN — MAGNESIUM OXIDE TAB 400 MG (241.3 MG ELEMENTAL MG) SCH TAB: 400 (241.3 MG) TAB at 18:31

## 2017-02-19 RX ADMIN — Medication SCH MG: at 09:20

## 2017-02-19 RX ADMIN — Medication SCH UNIT: at 09:20

## 2017-02-19 RX ADMIN — CLOTRIMAZOLE AND BETAMETHASONE DIPROPIONATE SCH APPLIC: 10; .5 CREAM TOPICAL at 09:14

## 2017-02-19 RX ADMIN — CLOTRIMAZOLE AND BETAMETHASONE DIPROPIONATE SCH APPLIC: 10; .5 CREAM TOPICAL at 22:29

## 2017-02-19 RX ADMIN — Medication SCH MG: at 18:30

## 2017-02-19 RX ADMIN — Medication SCH EACH: at 06:31

## 2017-02-19 RX ADMIN — Medication SCH EACH: at 09:20

## 2017-02-19 RX ADMIN — DEXTRAN 70, HYPROMELLOSE 2910 PRN EACH: 3; 1 SOLUTION/ DROPS OPHTHALMIC at 09:19

## 2017-02-19 RX ADMIN — Medication SCH EACH: at 18:30

## 2017-02-19 RX ADMIN — Medication SCH EACH: at 18:31

## 2017-02-19 RX ADMIN — CLOBETASOL PROPIONATE SCH APPLIC: 0.5 CREAM TOPICAL at 22:30

## 2017-02-19 RX ADMIN — CLOBETASOL PROPIONATE SCH APPLIC: 0.5 CREAM TOPICAL at 09:13

## 2017-02-19 RX ADMIN — Medication SCH MG: at 18:31

## 2017-02-20 RX ADMIN — Medication SCH EACH: at 18:41

## 2017-02-20 RX ADMIN — CLOBETASOL PROPIONATE SCH APPLIC: 0.5 CREAM TOPICAL at 09:17

## 2017-02-20 RX ADMIN — Medication SCH MG: at 18:40

## 2017-02-20 RX ADMIN — Medication SCH EACH: at 06:05

## 2017-02-20 RX ADMIN — Medication SCH MG: at 09:27

## 2017-02-20 RX ADMIN — CLOTRIMAZOLE AND BETAMETHASONE DIPROPIONATE SCH APPLIC: 10; .5 CREAM TOPICAL at 09:17

## 2017-02-20 RX ADMIN — CLOTRIMAZOLE AND BETAMETHASONE DIPROPIONATE SCH APPLIC: 10; .5 CREAM TOPICAL at 22:30

## 2017-02-20 RX ADMIN — Medication SCH EACH: at 09:23

## 2017-02-20 RX ADMIN — CLOBETASOL PROPIONATE SCH APPLIC: 0.5 CREAM TOPICAL at 22:30

## 2017-02-20 RX ADMIN — Medication SCH UNIT: at 09:23

## 2017-02-20 RX ADMIN — Medication SCH EACH: at 18:40

## 2017-02-20 RX ADMIN — MAGNESIUM OXIDE TAB 400 MG (241.3 MG ELEMENTAL MG) SCH TAB: 400 (241.3 MG) TAB at 18:41

## 2017-02-21 RX ADMIN — MAGNESIUM OXIDE TAB 400 MG (241.3 MG ELEMENTAL MG) SCH TAB: 400 (241.3 MG) TAB at 18:01

## 2017-02-21 RX ADMIN — CLOTRIMAZOLE AND BETAMETHASONE DIPROPIONATE SCH APPLIC: 10; .5 CREAM TOPICAL at 23:15

## 2017-02-21 RX ADMIN — CLOBETASOL PROPIONATE SCH APPLIC: 0.5 CREAM TOPICAL at 23:15

## 2017-02-21 RX ADMIN — CLOTRIMAZOLE AND BETAMETHASONE DIPROPIONATE SCH APPLIC: 10; .5 CREAM TOPICAL at 09:07

## 2017-02-21 RX ADMIN — Medication SCH EACH: at 06:56

## 2017-02-21 RX ADMIN — Medication SCH EACH: at 18:01

## 2017-02-21 RX ADMIN — CLOBETASOL PROPIONATE SCH APPLIC: 0.5 CREAM TOPICAL at 09:07

## 2017-02-21 RX ADMIN — Medication SCH EACH: at 09:06

## 2017-02-21 RX ADMIN — Medication SCH MG: at 18:02

## 2017-02-21 RX ADMIN — Medication SCH MG: at 09:05

## 2017-02-21 RX ADMIN — Medication SCH UNIT: at 09:06

## 2017-02-21 RX ADMIN — Medication SCH MG: at 18:01

## 2017-02-22 RX ADMIN — Medication SCH EACH: at 06:05

## 2017-02-22 RX ADMIN — Medication SCH EACH: at 18:51

## 2017-02-22 RX ADMIN — Medication SCH MG: at 18:52

## 2017-02-22 RX ADMIN — CLOBETASOL PROPIONATE SCH APPLIC: 0.5 CREAM TOPICAL at 10:01

## 2017-02-22 RX ADMIN — CLOTRIMAZOLE AND BETAMETHASONE DIPROPIONATE SCH APPLIC: 10; .5 CREAM TOPICAL at 10:00

## 2017-02-22 RX ADMIN — Medication SCH MG: at 10:03

## 2017-02-22 RX ADMIN — Medication SCH EACH: at 18:50

## 2017-02-22 RX ADMIN — Medication SCH EACH: at 10:01

## 2017-02-22 RX ADMIN — CLOBETASOL PROPIONATE SCH APPLIC: 0.5 CREAM TOPICAL at 23:21

## 2017-02-22 RX ADMIN — MAGNESIUM OXIDE TAB 400 MG (241.3 MG ELEMENTAL MG) SCH TAB: 400 (241.3 MG) TAB at 18:52

## 2017-02-22 RX ADMIN — DEXTRAN 70, HYPROMELLOSE 2910 PRN EACH: 3; 1 SOLUTION/ DROPS OPHTHALMIC at 10:04

## 2017-02-22 RX ADMIN — CLOTRIMAZOLE AND BETAMETHASONE DIPROPIONATE SCH APPLIC: 10; .5 CREAM TOPICAL at 23:21

## 2017-02-22 RX ADMIN — Medication SCH MG: at 18:51

## 2017-02-22 RX ADMIN — Medication SCH UNIT: at 10:03

## 2017-02-23 RX ADMIN — Medication SCH EACH: at 18:18

## 2017-02-23 RX ADMIN — MAGNESIUM OXIDE TAB 400 MG (241.3 MG ELEMENTAL MG) SCH TAB: 400 (241.3 MG) TAB at 18:19

## 2017-02-23 RX ADMIN — Medication SCH MG: at 18:20

## 2017-02-23 RX ADMIN — Medication SCH MG: at 18:19

## 2017-02-23 RX ADMIN — CLOBETASOL PROPIONATE SCH APPLIC: 0.5 CREAM TOPICAL at 09:44

## 2017-02-23 RX ADMIN — Medication SCH EACH: at 06:03

## 2017-02-23 RX ADMIN — Medication SCH UNIT: at 09:43

## 2017-02-23 RX ADMIN — CLOBETASOL PROPIONATE SCH APPLIC: 0.5 CREAM TOPICAL at 23:13

## 2017-02-23 RX ADMIN — Medication SCH EACH: at 09:46

## 2017-02-23 RX ADMIN — CLOTRIMAZOLE AND BETAMETHASONE DIPROPIONATE SCH APPLIC: 10; .5 CREAM TOPICAL at 23:14

## 2017-02-23 RX ADMIN — Medication SCH MG: at 09:47

## 2017-02-23 RX ADMIN — CLOTRIMAZOLE AND BETAMETHASONE DIPROPIONATE SCH APPLIC: 10; .5 CREAM TOPICAL at 09:45

## 2017-02-23 RX ADMIN — DEXTRAN 70, HYPROMELLOSE 2910 PRN EACH: 3; 1 SOLUTION/ DROPS OPHTHALMIC at 09:45

## 2017-02-24 RX ADMIN — Medication SCH MG: at 18:18

## 2017-02-24 RX ADMIN — MAGNESIUM OXIDE TAB 400 MG (241.3 MG ELEMENTAL MG) SCH TAB: 400 (241.3 MG) TAB at 18:17

## 2017-02-24 RX ADMIN — CLOBETASOL PROPIONATE SCH APPLIC: 0.5 CREAM TOPICAL at 22:58

## 2017-02-24 RX ADMIN — Medication SCH EACH: at 18:17

## 2017-02-24 RX ADMIN — CLOTRIMAZOLE AND BETAMETHASONE DIPROPIONATE SCH APPLIC: 10; .5 CREAM TOPICAL at 22:58

## 2017-02-24 RX ADMIN — Medication SCH MG: at 09:13

## 2017-02-24 RX ADMIN — Medication SCH EACH: at 05:50

## 2017-02-24 RX ADMIN — Medication SCH MG: at 18:17

## 2017-02-24 RX ADMIN — Medication SCH EACH: at 09:12

## 2017-02-24 RX ADMIN — CLOBETASOL PROPIONATE SCH: 0.5 CREAM TOPICAL at 18:59

## 2017-02-24 RX ADMIN — Medication SCH EACH: at 18:18

## 2017-02-24 RX ADMIN — Medication SCH: at 06:15

## 2017-02-24 RX ADMIN — Medication SCH UNIT: at 09:13

## 2017-02-25 RX ADMIN — Medication SCH UNIT: at 09:13

## 2017-02-25 RX ADMIN — Medication SCH EACH: at 09:13

## 2017-02-25 RX ADMIN — Medication SCH MG: at 18:04

## 2017-02-25 RX ADMIN — Medication SCH MG: at 09:13

## 2017-02-25 RX ADMIN — MAGNESIUM OXIDE TAB 400 MG (241.3 MG ELEMENTAL MG) SCH TAB: 400 (241.3 MG) TAB at 18:03

## 2017-02-25 RX ADMIN — CLOTRIMAZOLE AND BETAMETHASONE DIPROPIONATE SCH APPLIC: 10; .5 CREAM TOPICAL at 23:05

## 2017-02-25 RX ADMIN — Medication SCH EACH: at 18:03

## 2017-02-25 RX ADMIN — CLOTRIMAZOLE AND BETAMETHASONE DIPROPIONATE SCH APPLIC: 10; .5 CREAM TOPICAL at 09:14

## 2017-02-25 RX ADMIN — CLOBETASOL PROPIONATE SCH APPLIC: 0.5 CREAM TOPICAL at 23:05

## 2017-02-25 RX ADMIN — CLOBETASOL PROPIONATE SCH APPLIC: 0.5 CREAM TOPICAL at 09:14

## 2017-02-26 RX ADMIN — Medication SCH UNIT: at 09:38

## 2017-02-26 RX ADMIN — CLOTRIMAZOLE AND BETAMETHASONE DIPROPIONATE SCH APPLIC: 10; .5 CREAM TOPICAL at 09:39

## 2017-02-26 RX ADMIN — Medication SCH MG: at 09:37

## 2017-02-26 RX ADMIN — Medication SCH MG: at 18:16

## 2017-02-26 RX ADMIN — Medication SCH EACH: at 18:17

## 2017-02-26 RX ADMIN — CLOBETASOL PROPIONATE SCH APPLIC: 0.5 CREAM TOPICAL at 23:09

## 2017-02-26 RX ADMIN — CLOBETASOL PROPIONATE SCH APPLIC: 0.5 CREAM TOPICAL at 09:38

## 2017-02-26 RX ADMIN — Medication SCH EACH: at 09:37

## 2017-02-26 RX ADMIN — Medication SCH EACH: at 18:16

## 2017-02-26 RX ADMIN — MAGNESIUM OXIDE TAB 400 MG (241.3 MG ELEMENTAL MG) SCH TAB: 400 (241.3 MG) TAB at 18:16

## 2017-02-26 RX ADMIN — CLOTRIMAZOLE AND BETAMETHASONE DIPROPIONATE SCH APPLIC: 10; .5 CREAM TOPICAL at 23:09

## 2017-02-26 RX ADMIN — Medication SCH EACH: at 06:51

## 2017-02-27 RX ADMIN — LIDOCAINE HYDROCHLORIDE PRN APPLIC: 20 JELLY TOPICAL at 22:56

## 2017-02-27 RX ADMIN — Medication SCH: at 09:10

## 2017-02-27 RX ADMIN — MAGNESIUM OXIDE TAB 400 MG (241.3 MG ELEMENTAL MG) SCH TAB: 400 (241.3 MG) TAB at 18:08

## 2017-02-27 RX ADMIN — Medication SCH EACH: at 18:07

## 2017-02-27 RX ADMIN — CLOTRIMAZOLE AND BETAMETHASONE DIPROPIONATE SCH: 10; .5 CREAM TOPICAL at 09:10

## 2017-02-27 RX ADMIN — Medication SCH: at 09:09

## 2017-02-27 RX ADMIN — Medication SCH EACH: at 14:04

## 2017-02-27 RX ADMIN — Medication SCH EACH: at 18:06

## 2017-02-27 RX ADMIN — Medication SCH MG: at 05:59

## 2017-02-27 RX ADMIN — Medication SCH MG: at 18:07

## 2017-02-27 RX ADMIN — Medication SCH EACH: at 05:55

## 2017-02-27 RX ADMIN — DEXTRAN 70, HYPROMELLOSE 2910 PRN EACH: 3; 1 SOLUTION/ DROPS OPHTHALMIC at 18:08

## 2017-02-27 RX ADMIN — CLOBETASOL PROPIONATE SCH: 0.5 CREAM TOPICAL at 09:09

## 2017-02-27 RX ADMIN — LIDOCAINE HYDROCHLORIDE PRN APPLIC: 20 JELLY TOPICAL at 14:04

## 2017-02-27 RX ADMIN — Medication SCH MG: at 18:08

## 2017-02-27 RX ADMIN — Medication SCH UNIT: at 05:54

## 2017-02-27 RX ADMIN — Medication SCH EACH: at 05:58

## 2017-02-28 RX ADMIN — Medication SCH EACH: at 18:05

## 2017-02-28 RX ADMIN — Medication SCH EACH: at 09:00

## 2017-02-28 RX ADMIN — LIDOCAINE HYDROCHLORIDE PRN APPLIC: 20 JELLY TOPICAL at 17:05

## 2017-02-28 RX ADMIN — Medication SCH UNIT: at 08:59

## 2017-02-28 RX ADMIN — Medication SCH EACH: at 18:06

## 2017-02-28 RX ADMIN — LIDOCAINE HYDROCHLORIDE PRN APPLIC: 20 JELLY TOPICAL at 09:00

## 2017-02-28 RX ADMIN — Medication SCH MG: at 18:06

## 2017-02-28 RX ADMIN — Medication SCH MG: at 18:05

## 2017-02-28 RX ADMIN — Medication SCH EACH: at 05:54

## 2017-02-28 RX ADMIN — MAGNESIUM OXIDE TAB 400 MG (241.3 MG ELEMENTAL MG) SCH TAB: 400 (241.3 MG) TAB at 18:06

## 2017-02-28 RX ADMIN — LIDOCAINE HYDROCHLORIDE PRN APPLIC: 20 JELLY TOPICAL at 22:49

## 2017-02-28 RX ADMIN — LIDOCAINE HYDROCHLORIDE PRN APPLIC: 20 JELLY TOPICAL at 01:22

## 2017-02-28 RX ADMIN — Medication SCH MG: at 09:00

## 2017-02-28 RX ADMIN — Medication SCH EACH: at 07:00

## 2017-03-01 RX ADMIN — LIDOCAINE HYDROCHLORIDE PRN APPLIC: 20 JELLY TOPICAL at 13:23

## 2017-03-01 RX ADMIN — LIDOCAINE HYDROCHLORIDE PRN APPLIC: 20 JELLY TOPICAL at 23:14

## 2017-03-01 RX ADMIN — Medication SCH EACH: at 09:03

## 2017-03-01 RX ADMIN — Medication SCH MG: at 18:03

## 2017-03-01 RX ADMIN — LIDOCAINE HYDROCHLORIDE PRN APPLIC: 20 JELLY TOPICAL at 08:59

## 2017-03-01 RX ADMIN — Medication SCH MG: at 09:03

## 2017-03-01 RX ADMIN — Medication SCH UNIT: at 09:04

## 2017-03-01 RX ADMIN — Medication SCH EACH: at 06:01

## 2017-03-01 RX ADMIN — Medication SCH EACH: at 18:03

## 2017-03-01 RX ADMIN — LIDOCAINE HYDROCHLORIDE PRN APPLIC: 20 JELLY TOPICAL at 17:48

## 2017-03-01 RX ADMIN — MAGNESIUM OXIDE TAB 400 MG (241.3 MG ELEMENTAL MG) SCH TAB: 400 (241.3 MG) TAB at 18:03

## 2017-03-02 RX ADMIN — Medication SCH UNIT: at 09:37

## 2017-03-02 RX ADMIN — LIDOCAINE HYDROCHLORIDE PRN APPLIC: 20 JELLY TOPICAL at 18:39

## 2017-03-02 RX ADMIN — Medication SCH EACH: at 18:40

## 2017-03-02 RX ADMIN — Medication SCH EACH: at 18:41

## 2017-03-02 RX ADMIN — LIDOCAINE HYDROCHLORIDE PRN APPLIC: 20 JELLY TOPICAL at 09:40

## 2017-03-02 RX ADMIN — Medication SCH EACH: at 09:37

## 2017-03-02 RX ADMIN — LIDOCAINE HYDROCHLORIDE PRN APPLIC: 20 JELLY TOPICAL at 13:31

## 2017-03-02 RX ADMIN — Medication SCH EACH: at 06:13

## 2017-03-02 RX ADMIN — Medication SCH MG: at 18:39

## 2017-03-02 RX ADMIN — Medication SCH MG: at 09:38

## 2017-03-02 RX ADMIN — LIDOCAINE HYDROCHLORIDE PRN APPLIC: 20 JELLY TOPICAL at 22:50

## 2017-03-02 RX ADMIN — Medication SCH MG: at 18:40

## 2017-03-02 RX ADMIN — MAGNESIUM OXIDE TAB 400 MG (241.3 MG ELEMENTAL MG) SCH TAB: 400 (241.3 MG) TAB at 18:40

## 2017-03-03 RX ADMIN — LIDOCAINE HYDROCHLORIDE PRN APPLIC: 20 JELLY TOPICAL at 23:00

## 2017-03-03 RX ADMIN — Medication SCH EACH: at 09:07

## 2017-03-03 RX ADMIN — Medication SCH MG: at 09:07

## 2017-03-03 RX ADMIN — LIDOCAINE HYDROCHLORIDE PRN APPLIC: 20 JELLY TOPICAL at 09:09

## 2017-03-03 RX ADMIN — Medication SCH UNIT: at 09:06

## 2017-03-03 RX ADMIN — MAGNESIUM OXIDE TAB 400 MG (241.3 MG ELEMENTAL MG) SCH TAB: 400 (241.3 MG) TAB at 18:34

## 2017-03-03 RX ADMIN — Medication SCH MG: at 18:34

## 2017-03-03 RX ADMIN — Medication SCH EACH: at 18:35

## 2017-03-03 RX ADMIN — Medication SCH EACH: at 18:34

## 2017-03-03 RX ADMIN — Medication SCH MG: at 18:35

## 2017-03-03 RX ADMIN — Medication SCH EACH: at 06:43

## 2017-03-03 RX ADMIN — LIDOCAINE HYDROCHLORIDE PRN APPLIC: 20 JELLY TOPICAL at 14:17

## 2017-03-04 RX ADMIN — Medication SCH UNIT: at 10:06

## 2017-03-04 RX ADMIN — MAGNESIUM OXIDE TAB 400 MG (241.3 MG ELEMENTAL MG) SCH TAB: 400 (241.3 MG) TAB at 18:04

## 2017-03-04 RX ADMIN — DEXTRAN 70, HYPROMELLOSE 2910 PRN EACH: 3; 1 SOLUTION/ DROPS OPHTHALMIC at 10:08

## 2017-03-04 RX ADMIN — Medication SCH MG: at 18:03

## 2017-03-04 RX ADMIN — Medication SCH MG: at 10:07

## 2017-03-04 RX ADMIN — LIDOCAINE HYDROCHLORIDE PRN APPLIC: 20 JELLY TOPICAL at 23:25

## 2017-03-04 RX ADMIN — Medication SCH EACH: at 18:04

## 2017-03-04 RX ADMIN — LIDOCAINE HYDROCHLORIDE PRN APPLIC: 20 JELLY TOPICAL at 14:08

## 2017-03-04 RX ADMIN — Medication SCH EACH: at 10:06

## 2017-03-04 RX ADMIN — Medication SCH MG: at 18:04

## 2017-03-04 RX ADMIN — LIDOCAINE HYDROCHLORIDE PRN APPLIC: 20 JELLY TOPICAL at 17:21

## 2017-03-04 RX ADMIN — LIDOCAINE HYDROCHLORIDE PRN APPLIC: 20 JELLY TOPICAL at 10:08

## 2017-03-05 RX ADMIN — MAGNESIUM OXIDE TAB 400 MG (241.3 MG ELEMENTAL MG) SCH TAB: 400 (241.3 MG) TAB at 18:01

## 2017-03-05 RX ADMIN — Medication SCH EACH: at 09:31

## 2017-03-05 RX ADMIN — Medication SCH MG: at 18:01

## 2017-03-05 RX ADMIN — LIDOCAINE HYDROCHLORIDE PRN APPLIC: 20 JELLY TOPICAL at 22:56

## 2017-03-05 RX ADMIN — LIDOCAINE HYDROCHLORIDE PRN APPLIC: 20 JELLY TOPICAL at 14:16

## 2017-03-05 RX ADMIN — Medication SCH EACH: at 06:10

## 2017-03-05 RX ADMIN — Medication SCH MG: at 09:31

## 2017-03-05 RX ADMIN — LIDOCAINE HYDROCHLORIDE PRN APPLIC: 20 JELLY TOPICAL at 09:32

## 2017-03-05 RX ADMIN — Medication SCH UNIT: at 09:31

## 2017-03-05 RX ADMIN — Medication SCH EACH: at 18:01

## 2017-03-06 RX ADMIN — LIDOCAINE HYDROCHLORIDE PRN APPLIC: 20 JELLY TOPICAL at 10:21

## 2017-03-06 RX ADMIN — Medication SCH MG: at 18:14

## 2017-03-06 RX ADMIN — Medication SCH EACH: at 06:08

## 2017-03-06 RX ADMIN — Medication SCH EACH: at 10:19

## 2017-03-06 RX ADMIN — LIDOCAINE HYDROCHLORIDE PRN APPLIC: 20 JELLY TOPICAL at 19:01

## 2017-03-06 RX ADMIN — Medication SCH MG: at 10:19

## 2017-03-06 RX ADMIN — LIDOCAINE HYDROCHLORIDE PRN APPLIC: 20 JELLY TOPICAL at 15:22

## 2017-03-06 RX ADMIN — Medication SCH UNIT: at 10:20

## 2017-03-06 RX ADMIN — LIDOCAINE HYDROCHLORIDE PRN APPLIC: 20 JELLY TOPICAL at 23:24

## 2017-03-06 RX ADMIN — MAGNESIUM OXIDE TAB 400 MG (241.3 MG ELEMENTAL MG) SCH TAB: 400 (241.3 MG) TAB at 18:14

## 2017-03-06 RX ADMIN — Medication SCH EACH: at 18:15

## 2017-03-06 RX ADMIN — Medication SCH EACH: at 18:14

## 2017-03-07 RX ADMIN — Medication SCH MG: at 18:40

## 2017-03-07 RX ADMIN — Medication SCH MG: at 18:39

## 2017-03-07 RX ADMIN — Medication SCH EACH: at 06:35

## 2017-03-07 RX ADMIN — LIDOCAINE HYDROCHLORIDE PRN APPLIC: 20 JELLY TOPICAL at 14:00

## 2017-03-07 RX ADMIN — Medication SCH EACH: at 09:15

## 2017-03-07 RX ADMIN — MAGNESIUM OXIDE TAB 400 MG (241.3 MG ELEMENTAL MG) SCH TAB: 400 (241.3 MG) TAB at 18:40

## 2017-03-07 RX ADMIN — LIDOCAINE HYDROCHLORIDE PRN APPLIC: 20 JELLY TOPICAL at 22:32

## 2017-03-07 RX ADMIN — Medication SCH MG: at 09:16

## 2017-03-07 RX ADMIN — Medication SCH EACH: at 18:40

## 2017-03-07 RX ADMIN — Medication SCH EACH: at 18:39

## 2017-03-07 RX ADMIN — LIDOCAINE HYDROCHLORIDE PRN APPLIC: 20 JELLY TOPICAL at 09:15

## 2017-03-07 RX ADMIN — Medication SCH UNIT: at 09:16

## 2017-03-08 RX ADMIN — LIDOCAINE HYDROCHLORIDE PRN APPLIC: 20 JELLY TOPICAL at 17:18

## 2017-03-08 RX ADMIN — Medication SCH EACH: at 09:23

## 2017-03-08 RX ADMIN — Medication SCH MG: at 18:11

## 2017-03-08 RX ADMIN — MAGNESIUM OXIDE TAB 400 MG (241.3 MG ELEMENTAL MG) SCH TAB: 400 (241.3 MG) TAB at 18:12

## 2017-03-08 RX ADMIN — Medication SCH MG: at 18:12

## 2017-03-08 RX ADMIN — Medication SCH EACH: at 18:12

## 2017-03-08 RX ADMIN — Medication SCH EACH: at 06:40

## 2017-03-08 RX ADMIN — LIDOCAINE HYDROCHLORIDE PRN APPLIC: 20 JELLY TOPICAL at 23:19

## 2017-03-08 RX ADMIN — Medication SCH MG: at 09:22

## 2017-03-08 RX ADMIN — Medication SCH UNIT: at 09:23

## 2017-03-08 RX ADMIN — LIDOCAINE HYDROCHLORIDE PRN APPLIC: 20 JELLY TOPICAL at 09:25

## 2017-03-08 RX ADMIN — Medication SCH EACH: at 18:11

## 2017-03-09 RX ADMIN — Medication SCH EACH: at 18:15

## 2017-03-09 RX ADMIN — MAGNESIUM OXIDE TAB 400 MG (241.3 MG ELEMENTAL MG) SCH TAB: 400 (241.3 MG) TAB at 18:15

## 2017-03-09 RX ADMIN — LIDOCAINE HYDROCHLORIDE PRN APPLIC: 20 JELLY TOPICAL at 23:14

## 2017-03-09 RX ADMIN — Medication SCH UNIT: at 09:27

## 2017-03-09 RX ADMIN — Medication SCH EACH: at 18:14

## 2017-03-09 RX ADMIN — LIDOCAINE HYDROCHLORIDE PRN APPLIC: 20 JELLY TOPICAL at 14:00

## 2017-03-09 RX ADMIN — Medication SCH EACH: at 06:13

## 2017-03-09 RX ADMIN — LIDOCAINE HYDROCHLORIDE PRN APPLIC: 20 JELLY TOPICAL at 09:27

## 2017-03-09 RX ADMIN — Medication SCH MG: at 18:15

## 2017-03-09 RX ADMIN — Medication SCH MG: at 09:27

## 2017-03-09 RX ADMIN — Medication SCH EACH: at 09:26

## 2017-03-10 RX ADMIN — Medication SCH EACH: at 18:17

## 2017-03-10 RX ADMIN — Medication SCH UNIT: at 09:59

## 2017-03-10 RX ADMIN — LIDOCAINE HYDROCHLORIDE PRN APPLIC: 20 JELLY TOPICAL at 22:55

## 2017-03-10 RX ADMIN — Medication SCH MG: at 09:59

## 2017-03-10 RX ADMIN — Medication SCH MG: at 18:18

## 2017-03-10 RX ADMIN — LIDOCAINE HYDROCHLORIDE PRN APPLIC: 20 JELLY TOPICAL at 10:00

## 2017-03-10 RX ADMIN — Medication SCH EACH: at 09:59

## 2017-03-10 RX ADMIN — Medication SCH EACH: at 18:18

## 2017-03-10 RX ADMIN — MAGNESIUM OXIDE TAB 400 MG (241.3 MG ELEMENTAL MG) SCH TAB: 400 (241.3 MG) TAB at 18:17

## 2017-03-10 RX ADMIN — Medication SCH MG: at 18:17

## 2017-03-10 RX ADMIN — LIDOCAINE HYDROCHLORIDE PRN APPLIC: 20 JELLY TOPICAL at 14:00

## 2017-03-10 RX ADMIN — Medication SCH EACH: at 06:14

## 2017-03-11 RX ADMIN — Medication SCH MG: at 09:50

## 2017-03-11 RX ADMIN — Medication SCH MG: at 18:20

## 2017-03-11 RX ADMIN — LIDOCAINE HYDROCHLORIDE PRN APPLIC: 20 JELLY TOPICAL at 09:51

## 2017-03-11 RX ADMIN — MAGNESIUM OXIDE TAB 400 MG (241.3 MG ELEMENTAL MG) SCH TAB: 400 (241.3 MG) TAB at 18:20

## 2017-03-11 RX ADMIN — DEXTRAN 70, HYPROMELLOSE 2910 PRN EACH: 3; 1 SOLUTION/ DROPS OPHTHALMIC at 18:21

## 2017-03-11 RX ADMIN — Medication SCH EACH: at 18:19

## 2017-03-11 RX ADMIN — Medication SCH EACH: at 09:52

## 2017-03-11 RX ADMIN — LIDOCAINE HYDROCHLORIDE PRN APPLIC: 20 JELLY TOPICAL at 17:11

## 2017-03-11 RX ADMIN — Medication SCH UNIT: at 09:50

## 2017-03-11 RX ADMIN — LIDOCAINE HYDROCHLORIDE PRN APPLIC: 20 JELLY TOPICAL at 23:24

## 2017-03-12 RX ADMIN — LIDOCAINE HYDROCHLORIDE PRN APPLIC: 20 JELLY TOPICAL at 10:37

## 2017-03-12 RX ADMIN — Medication SCH EACH: at 06:04

## 2017-03-12 RX ADMIN — LIDOCAINE HYDROCHLORIDE PRN APPLIC: 20 JELLY TOPICAL at 23:10

## 2017-03-12 RX ADMIN — Medication SCH MG: at 18:13

## 2017-03-12 RX ADMIN — Medication SCH MG: at 18:12

## 2017-03-12 RX ADMIN — Medication SCH UNIT: at 10:38

## 2017-03-12 RX ADMIN — MAGNESIUM OXIDE TAB 400 MG (241.3 MG ELEMENTAL MG) SCH TAB: 400 (241.3 MG) TAB at 18:12

## 2017-03-12 RX ADMIN — Medication SCH EACH: at 18:12

## 2017-03-12 RX ADMIN — Medication SCH EACH: at 18:13

## 2017-03-12 RX ADMIN — Medication SCH MG: at 10:38

## 2017-03-12 RX ADMIN — Medication SCH EACH: at 10:39

## 2017-03-13 RX ADMIN — LIDOCAINE HYDROCHLORIDE PRN APPLIC: 20 JELLY TOPICAL at 10:28

## 2017-03-13 RX ADMIN — Medication SCH: at 06:02

## 2017-03-13 RX ADMIN — Medication SCH MG: at 10:27

## 2017-03-13 RX ADMIN — Medication SCH MG: at 18:11

## 2017-03-13 RX ADMIN — Medication SCH EACH: at 10:27

## 2017-03-13 RX ADMIN — LIDOCAINE HYDROCHLORIDE PRN APPLIC: 20 JELLY TOPICAL at 17:19

## 2017-03-13 RX ADMIN — Medication SCH EACH: at 18:10

## 2017-03-13 RX ADMIN — Medication SCH MG: at 18:10

## 2017-03-13 RX ADMIN — Medication SCH UNIT: at 10:26

## 2017-03-13 RX ADMIN — Medication SCH EACH: at 05:27

## 2017-03-13 RX ADMIN — MAGNESIUM OXIDE TAB 400 MG (241.3 MG ELEMENTAL MG) SCH TAB: 400 (241.3 MG) TAB at 18:11

## 2017-03-13 RX ADMIN — LIDOCAINE HYDROCHLORIDE PRN APPLIC: 20 JELLY TOPICAL at 23:30

## 2017-03-14 RX ADMIN — Medication SCH EACH: at 10:26

## 2017-03-14 RX ADMIN — Medication SCH EACH: at 18:07

## 2017-03-14 RX ADMIN — Medication SCH MG: at 10:25

## 2017-03-14 RX ADMIN — Medication SCH MG: at 18:06

## 2017-03-14 RX ADMIN — MAGNESIUM OXIDE TAB 400 MG (241.3 MG ELEMENTAL MG) SCH TAB: 400 (241.3 MG) TAB at 18:07

## 2017-03-14 RX ADMIN — Medication SCH UNIT: at 10:25

## 2017-03-14 RX ADMIN — Medication SCH EACH: at 18:06

## 2017-03-14 RX ADMIN — LIDOCAINE HYDROCHLORIDE PRN APPLIC: 20 JELLY TOPICAL at 10:26

## 2017-03-14 RX ADMIN — Medication SCH EACH: at 06:33

## 2017-03-14 RX ADMIN — Medication SCH MG: at 18:07

## 2017-03-15 RX ADMIN — Medication SCH EACH: at 18:08

## 2017-03-15 RX ADMIN — Medication SCH EACH: at 10:18

## 2017-03-15 RX ADMIN — Medication SCH MG: at 18:08

## 2017-03-15 RX ADMIN — Medication SCH UNIT: at 10:17

## 2017-03-15 RX ADMIN — Medication SCH MG: at 10:17

## 2017-03-15 RX ADMIN — Medication SCH EACH: at 06:21

## 2017-03-15 RX ADMIN — Medication SCH MG: at 18:09

## 2017-03-15 RX ADMIN — Medication SCH EACH: at 18:07

## 2017-03-15 RX ADMIN — LIDOCAINE HYDROCHLORIDE PRN APPLIC: 20 JELLY TOPICAL at 22:56

## 2017-03-15 RX ADMIN — MAGNESIUM OXIDE TAB 400 MG (241.3 MG ELEMENTAL MG) SCH TAB: 400 (241.3 MG) TAB at 18:09

## 2017-03-16 RX ADMIN — MAGNESIUM OXIDE TAB 400 MG (241.3 MG ELEMENTAL MG) SCH TAB: 400 (241.3 MG) TAB at 17:59

## 2017-03-16 RX ADMIN — Medication SCH EACH: at 09:35

## 2017-03-16 RX ADMIN — Medication SCH EACH: at 17:59

## 2017-03-16 RX ADMIN — Medication SCH UNIT: at 09:34

## 2017-03-16 RX ADMIN — Medication SCH EACH: at 06:34

## 2017-03-16 RX ADMIN — Medication SCH MG: at 09:33

## 2017-03-16 RX ADMIN — Medication SCH MG: at 17:59

## 2017-03-17 RX ADMIN — Medication SCH MG: at 18:07

## 2017-03-17 RX ADMIN — Medication SCH MG: at 09:42

## 2017-03-17 RX ADMIN — Medication SCH EACH: at 06:32

## 2017-03-17 RX ADMIN — LIDOCAINE HYDROCHLORIDE PRN APPLIC: 20 JELLY TOPICAL at 13:09

## 2017-03-17 RX ADMIN — Medication SCH EACH: at 18:07

## 2017-03-17 RX ADMIN — Medication SCH MG: at 18:06

## 2017-03-17 RX ADMIN — Medication SCH EACH: at 18:06

## 2017-03-17 RX ADMIN — ANORECTAL OINTMENT PRN APPLIC: 15.7; .44; 24; 20.6 OINTMENT TOPICAL at 09:44

## 2017-03-17 RX ADMIN — Medication SCH UNIT: at 09:42

## 2017-03-17 RX ADMIN — MAGNESIUM OXIDE TAB 400 MG (241.3 MG ELEMENTAL MG) SCH TAB: 400 (241.3 MG) TAB at 18:06

## 2017-03-17 RX ADMIN — LIDOCAINE HYDROCHLORIDE PRN APPLIC: 20 JELLY TOPICAL at 23:50

## 2017-03-17 RX ADMIN — Medication SCH EACH: at 09:41

## 2017-03-18 RX ADMIN — Medication SCH MG: at 10:43

## 2017-03-18 RX ADMIN — LIDOCAINE HYDROCHLORIDE PRN APPLIC: 20 JELLY TOPICAL at 10:44

## 2017-03-18 RX ADMIN — Medication SCH MG: at 18:32

## 2017-03-18 RX ADMIN — Medication SCH UNIT: at 10:43

## 2017-03-18 RX ADMIN — MAGNESIUM OXIDE TAB 400 MG (241.3 MG ELEMENTAL MG) SCH TAB: 400 (241.3 MG) TAB at 18:31

## 2017-03-18 RX ADMIN — Medication SCH EACH: at 10:43

## 2017-03-18 RX ADMIN — Medication SCH EACH: at 18:31

## 2017-03-19 RX ADMIN — LIDOCAINE HYDROCHLORIDE PRN APPLIC: 20 JELLY TOPICAL at 10:15

## 2017-03-19 RX ADMIN — Medication SCH EACH: at 18:48

## 2017-03-19 RX ADMIN — Medication SCH MG: at 18:49

## 2017-03-19 RX ADMIN — Medication SCH MG: at 10:14

## 2017-03-19 RX ADMIN — Medication SCH EACH: at 10:13

## 2017-03-19 RX ADMIN — Medication SCH UNIT: at 10:14

## 2017-03-19 RX ADMIN — Medication SCH EACH: at 18:49

## 2017-03-19 RX ADMIN — MAGNESIUM OXIDE TAB 400 MG (241.3 MG ELEMENTAL MG) SCH TAB: 400 (241.3 MG) TAB at 18:49

## 2017-03-19 RX ADMIN — Medication SCH MG: at 18:48

## 2017-03-19 RX ADMIN — Medication SCH EACH: at 09:13

## 2017-03-20 RX ADMIN — Medication SCH EACH: at 06:08

## 2017-03-20 RX ADMIN — Medication SCH MG: at 18:08

## 2017-03-20 RX ADMIN — Medication SCH MG: at 10:47

## 2017-03-20 RX ADMIN — Medication SCH EACH: at 18:08

## 2017-03-20 RX ADMIN — Medication SCH EACH: at 18:07

## 2017-03-20 RX ADMIN — Medication SCH EACH: at 10:46

## 2017-03-20 RX ADMIN — MAGNESIUM OXIDE TAB 400 MG (241.3 MG ELEMENTAL MG) SCH TAB: 400 (241.3 MG) TAB at 18:07

## 2017-03-20 RX ADMIN — Medication SCH UNIT: at 10:47

## 2017-03-21 RX ADMIN — Medication SCH MG: at 18:17

## 2017-03-21 RX ADMIN — Medication SCH UNIT: at 09:45

## 2017-03-21 RX ADMIN — Medication SCH MG: at 09:46

## 2017-03-21 RX ADMIN — Medication SCH MG: at 18:18

## 2017-03-21 RX ADMIN — Medication SCH EACH: at 18:17

## 2017-03-21 RX ADMIN — MAGNESIUM OXIDE TAB 400 MG (241.3 MG ELEMENTAL MG) SCH TAB: 400 (241.3 MG) TAB at 18:18

## 2017-03-21 RX ADMIN — LIDOCAINE HYDROCHLORIDE PRN APPLIC: 20 JELLY TOPICAL at 09:46

## 2017-03-21 RX ADMIN — LIDOCAINE HYDROCHLORIDE PRN APPLIC: 20 JELLY TOPICAL at 22:34

## 2017-03-21 RX ADMIN — Medication SCH EACH: at 09:45

## 2017-03-21 RX ADMIN — Medication SCH EACH: at 18:19

## 2017-03-21 RX ADMIN — Medication SCH EACH: at 06:03

## 2017-03-21 NOTE — PN
Progress Note for JENIFER SHEETS  Date:  03/20/2017  Room #:

 

SUBJECTIVE:  An 87-year-old, seen today for monthly rounds.  I had seen her and

visited with her earlier this month after she had seen the gynecologist and had

a biopsy for a vulvar irritation.  The biopsy came back okay.  There was no

cancer.  The burning and fiery pain is improved, but it is still burning, no

itching.  Her daughter does her laundry, they tried scent free detergent.  She

is using different underwear and still she has the burning and irritation.

Otherwise, she has no chest pain.  No trouble breathing.  She is still using a

Calmoseptine as needed.  She had finished a 2-week course of Lotrisone.

 

OBJECTIVE:  Vital Signs:  Temperature is 97.6, pulse 60, blood pressure 133/65,

respiratory rate 18, and O2 of 95% on room air.

General:  She is in no acute distress.

Heart:  Irregularly irregular with a murmur.  Lung sounds are clear to

auscultation bilaterally without crackles or wheezes.

Extremities:  Warm and dry.  No edema.

Mental Status:  Alert and orientated x3.

 

ASSESSMENT AND PLAN:

1. Atrial fibrillation, rate controlled on Pradaxa.  Continue the same.

2. Hypothyroidism, treated.

3. Essential hypertension, controlled.

4. Osteoarthritis which affects her ability to do ADLs and mobility.

5. Mild thrombocytopenia.

6. Chronic vulvar vaginal irritation, it is mostly vulvar burning currently.

 

PLAN:  At this point, she is already seeing Gynecology and had biopsy.  She has

been on several different creams right now, barrier cream seems to be working

the best.  No lab work is due.  We will recertify her again next month.

 

 

MKA:  03/20/2017 12:39:45  MODL:  03/20/2017 13:19:30

Job #:  [id number]

## 2017-03-22 RX ADMIN — MAGNESIUM OXIDE TAB 400 MG (241.3 MG ELEMENTAL MG) SCH TAB: 400 (241.3 MG) TAB at 18:21

## 2017-03-22 RX ADMIN — Medication SCH UNIT: at 09:43

## 2017-03-22 RX ADMIN — Medication SCH MG: at 18:22

## 2017-03-22 RX ADMIN — Medication SCH EACH: at 06:13

## 2017-03-22 RX ADMIN — Medication SCH EACH: at 09:43

## 2017-03-22 RX ADMIN — Medication SCH MG: at 09:44

## 2017-03-22 RX ADMIN — Medication SCH MG: at 18:21

## 2017-03-22 RX ADMIN — Medication SCH EACH: at 18:22

## 2017-03-22 RX ADMIN — LIDOCAINE HYDROCHLORIDE PRN APPLIC: 20 JELLY TOPICAL at 09:44

## 2017-03-23 RX ADMIN — LIDOCAINE HYDROCHLORIDE PRN APPLIC: 20 JELLY TOPICAL at 10:06

## 2017-03-23 RX ADMIN — Medication SCH MG: at 18:36

## 2017-03-23 RX ADMIN — Medication SCH MG: at 10:06

## 2017-03-23 RX ADMIN — Medication SCH UNIT: at 10:07

## 2017-03-23 RX ADMIN — Medication SCH EACH: at 18:36

## 2017-03-23 RX ADMIN — Medication SCH EACH: at 10:07

## 2017-03-23 RX ADMIN — Medication SCH EACH: at 06:22

## 2017-03-23 RX ADMIN — LIDOCAINE HYDROCHLORIDE PRN APPLIC: 20 JELLY TOPICAL at 23:03

## 2017-03-23 RX ADMIN — MAGNESIUM OXIDE TAB 400 MG (241.3 MG ELEMENTAL MG) SCH TAB: 400 (241.3 MG) TAB at 18:36

## 2017-03-24 RX ADMIN — Medication SCH EACH: at 06:03

## 2017-03-24 RX ADMIN — Medication SCH MG: at 09:30

## 2017-03-24 RX ADMIN — LIDOCAINE HYDROCHLORIDE PRN APPLIC: 20 JELLY TOPICAL at 16:55

## 2017-03-24 RX ADMIN — DEXTRAN 70, HYPROMELLOSE 2910 PRN EACH: 3; 1 SOLUTION/ DROPS OPHTHALMIC at 09:40

## 2017-03-24 RX ADMIN — Medication SCH EACH: at 09:29

## 2017-03-24 RX ADMIN — LIDOCAINE HYDROCHLORIDE PRN APPLIC: 20 JELLY TOPICAL at 09:31

## 2017-03-24 RX ADMIN — Medication SCH MG: at 19:43

## 2017-03-24 RX ADMIN — MAGNESIUM OXIDE TAB 400 MG (241.3 MG ELEMENTAL MG) SCH TAB: 400 (241.3 MG) TAB at 19:43

## 2017-03-24 RX ADMIN — Medication SCH EACH: at 19:42

## 2017-03-24 RX ADMIN — Medication SCH UNIT: at 09:30

## 2017-03-25 RX ADMIN — Medication SCH EACH: at 17:59

## 2017-03-25 RX ADMIN — Medication SCH EACH: at 09:39

## 2017-03-25 RX ADMIN — Medication SCH MG: at 09:40

## 2017-03-25 RX ADMIN — Medication SCH MG: at 18:00

## 2017-03-25 RX ADMIN — Medication SCH UNIT: at 09:39

## 2017-03-25 RX ADMIN — Medication SCH MG: at 17:59

## 2017-03-25 RX ADMIN — LIDOCAINE HYDROCHLORIDE PRN APPLIC: 20 JELLY TOPICAL at 09:39

## 2017-03-25 RX ADMIN — MAGNESIUM OXIDE TAB 400 MG (241.3 MG ELEMENTAL MG) SCH TAB: 400 (241.3 MG) TAB at 18:00

## 2017-03-25 RX ADMIN — Medication SCH EACH: at 18:00

## 2017-03-26 RX ADMIN — MAGNESIUM OXIDE TAB 400 MG (241.3 MG ELEMENTAL MG) SCH TAB: 400 (241.3 MG) TAB at 18:05

## 2017-03-26 RX ADMIN — LIDOCAINE HYDROCHLORIDE PRN APPLIC: 20 JELLY TOPICAL at 23:00

## 2017-03-26 RX ADMIN — DEXTRAN 70, HYPROMELLOSE 2910 PRN EACH: 3; 1 SOLUTION/ DROPS OPHTHALMIC at 09:50

## 2017-03-26 RX ADMIN — LIDOCAINE HYDROCHLORIDE PRN APPLIC: 20 JELLY TOPICAL at 09:40

## 2017-03-26 RX ADMIN — Medication SCH MG: at 18:05

## 2017-03-26 RX ADMIN — Medication SCH EACH: at 09:37

## 2017-03-26 RX ADMIN — Medication SCH UNIT: at 09:37

## 2017-03-26 RX ADMIN — Medication SCH EACH: at 18:05

## 2017-03-26 RX ADMIN — Medication SCH EACH: at 18:04

## 2017-03-26 RX ADMIN — Medication SCH MG: at 09:37

## 2017-03-26 RX ADMIN — Medication SCH MG: at 18:04

## 2017-03-27 RX ADMIN — Medication SCH EACH: at 06:29

## 2017-03-27 RX ADMIN — MAGNESIUM OXIDE TAB 400 MG (241.3 MG ELEMENTAL MG) SCH TAB: 400 (241.3 MG) TAB at 18:04

## 2017-03-27 RX ADMIN — Medication SCH MG: at 09:38

## 2017-03-27 RX ADMIN — LIDOCAINE HYDROCHLORIDE PRN APPLIC: 20 JELLY TOPICAL at 09:39

## 2017-03-27 RX ADMIN — Medication SCH EACH: at 18:02

## 2017-03-27 RX ADMIN — Medication SCH EACH: at 18:03

## 2017-03-27 RX ADMIN — Medication SCH MG: at 18:03

## 2017-03-27 RX ADMIN — Medication SCH UNIT: at 09:38

## 2017-03-27 RX ADMIN — Medication SCH EACH: at 09:38

## 2017-03-27 RX ADMIN — LIDOCAINE HYDROCHLORIDE PRN APPLIC: 20 JELLY TOPICAL at 23:15

## 2017-03-28 RX ADMIN — Medication SCH MG: at 18:33

## 2017-03-28 RX ADMIN — Medication SCH UNIT: at 09:41

## 2017-03-28 RX ADMIN — MAGNESIUM OXIDE TAB 400 MG (241.3 MG ELEMENTAL MG) SCH TAB: 400 (241.3 MG) TAB at 18:34

## 2017-03-28 RX ADMIN — Medication SCH EACH: at 18:33

## 2017-03-28 RX ADMIN — Medication SCH EACH: at 18:34

## 2017-03-28 RX ADMIN — LIDOCAINE HYDROCHLORIDE PRN APPLIC: 20 JELLY TOPICAL at 09:41

## 2017-03-28 RX ADMIN — Medication SCH MG: at 09:41

## 2017-03-28 RX ADMIN — Medication SCH EACH: at 06:12

## 2017-03-28 RX ADMIN — Medication SCH EACH: at 09:40

## 2017-03-29 RX ADMIN — Medication SCH EACH: at 09:06

## 2017-03-29 RX ADMIN — Medication SCH MG: at 18:34

## 2017-03-29 RX ADMIN — Medication SCH MG: at 09:06

## 2017-03-29 RX ADMIN — LIDOCAINE HYDROCHLORIDE PRN APPLIC: 20 JELLY TOPICAL at 09:06

## 2017-03-29 RX ADMIN — Medication SCH UNIT: at 09:06

## 2017-03-29 RX ADMIN — MAGNESIUM OXIDE TAB 400 MG (241.3 MG ELEMENTAL MG) SCH TAB: 400 (241.3 MG) TAB at 18:34

## 2017-03-29 RX ADMIN — Medication SCH EACH: at 18:33

## 2017-03-29 RX ADMIN — Medication SCH EACH: at 18:34

## 2017-03-29 RX ADMIN — Medication SCH EACH: at 06:08

## 2017-03-30 RX ADMIN — Medication SCH UNIT: at 09:33

## 2017-03-30 RX ADMIN — Medication SCH MG: at 09:32

## 2017-03-30 RX ADMIN — Medication SCH EACH: at 09:33

## 2017-03-30 RX ADMIN — Medication SCH MG: at 18:02

## 2017-03-30 RX ADMIN — Medication SCH MG: at 18:03

## 2017-03-30 RX ADMIN — Medication SCH EACH: at 18:00

## 2017-03-30 RX ADMIN — Medication SCH EACH: at 18:05

## 2017-03-30 RX ADMIN — MAGNESIUM OXIDE TAB 400 MG (241.3 MG ELEMENTAL MG) SCH TAB: 400 (241.3 MG) TAB at 18:07

## 2017-03-30 RX ADMIN — LIDOCAINE HYDROCHLORIDE PRN APPLIC: 20 JELLY TOPICAL at 09:33

## 2017-03-30 RX ADMIN — Medication SCH EACH: at 09:32

## 2017-03-31 RX ADMIN — LIDOCAINE HYDROCHLORIDE PRN APPLIC: 20 JELLY TOPICAL at 22:52

## 2017-03-31 RX ADMIN — Medication SCH EACH: at 06:33

## 2017-03-31 RX ADMIN — Medication SCH EACH: at 18:00

## 2017-03-31 RX ADMIN — Medication SCH MG: at 18:00

## 2017-03-31 RX ADMIN — LIDOCAINE HYDROCHLORIDE PRN APPLIC: 20 JELLY TOPICAL at 09:33

## 2017-03-31 RX ADMIN — Medication SCH MG: at 09:45

## 2017-03-31 RX ADMIN — Medication SCH EACH: at 09:46

## 2017-03-31 RX ADMIN — DEXTRAN 70, HYPROMELLOSE 2910 PRN EACH: 3; 1 SOLUTION/ DROPS OPHTHALMIC at 09:44

## 2017-03-31 RX ADMIN — MAGNESIUM OXIDE TAB 400 MG (241.3 MG ELEMENTAL MG) SCH TAB: 400 (241.3 MG) TAB at 18:01

## 2017-03-31 RX ADMIN — Medication SCH UNIT: at 09:44

## 2017-03-31 RX ADMIN — Medication SCH MG: at 18:01

## 2017-04-01 RX ADMIN — MAGNESIUM OXIDE TAB 400 MG (241.3 MG ELEMENTAL MG) SCH TAB: 400 (241.3 MG) TAB at 18:10

## 2017-04-01 RX ADMIN — Medication SCH EACH: at 18:10

## 2017-04-01 RX ADMIN — LIDOCAINE HYDROCHLORIDE PRN APPLIC: 20 JELLY TOPICAL at 23:06

## 2017-04-01 RX ADMIN — Medication SCH MG: at 18:10

## 2017-04-01 RX ADMIN — Medication SCH MG: at 18:09

## 2017-04-01 RX ADMIN — Medication SCH UNIT: at 09:54

## 2017-04-01 RX ADMIN — Medication SCH MG: at 09:55

## 2017-04-01 RX ADMIN — Medication SCH EACH: at 09:55

## 2017-04-01 RX ADMIN — LIDOCAINE HYDROCHLORIDE PRN APPLIC: 20 JELLY TOPICAL at 09:48

## 2017-04-02 RX ADMIN — Medication SCH EACH: at 18:18

## 2017-04-02 RX ADMIN — Medication SCH EACH: at 06:44

## 2017-04-02 RX ADMIN — MAGNESIUM OXIDE TAB 400 MG (241.3 MG ELEMENTAL MG) SCH TAB: 400 (241.3 MG) TAB at 18:18

## 2017-04-02 RX ADMIN — Medication SCH MG: at 18:18

## 2017-04-02 RX ADMIN — Medication SCH MG: at 10:04

## 2017-04-02 RX ADMIN — Medication SCH UNIT: at 10:04

## 2017-04-02 RX ADMIN — LIDOCAINE HYDROCHLORIDE PRN APPLIC: 20 JELLY TOPICAL at 10:04

## 2017-04-02 RX ADMIN — Medication SCH MG: at 18:17

## 2017-04-02 RX ADMIN — Medication SCH EACH: at 10:05

## 2017-04-02 RX ADMIN — LIDOCAINE HYDROCHLORIDE PRN APPLIC: 20 JELLY TOPICAL at 23:02

## 2017-04-03 RX ADMIN — Medication SCH EACH: at 10:36

## 2017-04-03 RX ADMIN — LIDOCAINE HYDROCHLORIDE PRN APPLIC: 20 JELLY TOPICAL at 10:35

## 2017-04-03 RX ADMIN — Medication SCH MG: at 10:36

## 2017-04-03 RX ADMIN — Medication SCH EACH: at 05:55

## 2017-04-03 RX ADMIN — LIDOCAINE HYDROCHLORIDE PRN APPLIC: 20 JELLY TOPICAL at 23:28

## 2017-04-03 RX ADMIN — DEXTRAN 70, HYPROMELLOSE 2910 PRN EACH: 3; 1 SOLUTION/ DROPS OPHTHALMIC at 12:16

## 2017-04-03 RX ADMIN — Medication SCH MG: at 17:59

## 2017-04-03 RX ADMIN — Medication SCH UNIT: at 10:36

## 2017-04-03 RX ADMIN — MAGNESIUM OXIDE TAB 400 MG (241.3 MG ELEMENTAL MG) SCH TAB: 400 (241.3 MG) TAB at 17:59

## 2017-04-03 RX ADMIN — Medication SCH EACH: at 17:59

## 2017-04-04 RX ADMIN — Medication SCH MG: at 09:38

## 2017-04-04 RX ADMIN — Medication SCH MG: at 18:08

## 2017-04-04 RX ADMIN — LIDOCAINE HYDROCHLORIDE PRN APPLIC: 20 JELLY TOPICAL at 22:40

## 2017-04-04 RX ADMIN — MAGNESIUM OXIDE TAB 400 MG (241.3 MG ELEMENTAL MG) SCH TAB: 400 (241.3 MG) TAB at 18:08

## 2017-04-04 RX ADMIN — Medication SCH UNIT: at 09:38

## 2017-04-04 RX ADMIN — Medication SCH EACH: at 18:09

## 2017-04-04 RX ADMIN — Medication SCH EACH: at 06:12

## 2017-04-04 RX ADMIN — Medication SCH EACH: at 09:38

## 2017-04-04 RX ADMIN — LIDOCAINE HYDROCHLORIDE PRN APPLIC: 20 JELLY TOPICAL at 09:37

## 2017-04-04 RX ADMIN — Medication SCH EACH: at 18:08

## 2017-04-05 RX ADMIN — Medication SCH EACH: at 06:44

## 2017-04-05 RX ADMIN — Medication SCH MG: at 18:35

## 2017-04-05 RX ADMIN — Medication SCH EACH: at 18:36

## 2017-04-05 RX ADMIN — Medication SCH MG: at 10:01

## 2017-04-05 RX ADMIN — MAGNESIUM OXIDE TAB 400 MG (241.3 MG ELEMENTAL MG) SCH TAB: 400 (241.3 MG) TAB at 18:35

## 2017-04-05 RX ADMIN — LIDOCAINE HYDROCHLORIDE PRN APPLIC: 20 JELLY TOPICAL at 10:01

## 2017-04-05 RX ADMIN — Medication SCH UNIT: at 10:01

## 2017-04-05 RX ADMIN — LIDOCAINE HYDROCHLORIDE PRN APPLIC: 20 JELLY TOPICAL at 22:56

## 2017-04-05 RX ADMIN — Medication SCH EACH: at 10:01

## 2017-04-06 LAB
CHLORIDE SERPL-SCNC: 102 MMOL/L (ref 98–107)
SODIUM SERPL-SCNC: 139 MMOL/L (ref 136–145)

## 2017-04-06 RX ADMIN — Medication SCH UNIT: at 09:39

## 2017-04-06 RX ADMIN — Medication SCH MG: at 09:40

## 2017-04-06 RX ADMIN — Medication SCH EACH: at 06:38

## 2017-04-06 RX ADMIN — MAGNESIUM OXIDE TAB 400 MG (241.3 MG ELEMENTAL MG) SCH TAB: 400 (241.3 MG) TAB at 18:03

## 2017-04-06 RX ADMIN — Medication SCH EACH: at 18:03

## 2017-04-06 RX ADMIN — Medication SCH MG: at 18:02

## 2017-04-06 RX ADMIN — LIDOCAINE HYDROCHLORIDE PRN APPLIC: 20 JELLY TOPICAL at 09:41

## 2017-04-06 RX ADMIN — LIDOCAINE HYDROCHLORIDE PRN APPLIC: 20 JELLY TOPICAL at 22:30

## 2017-04-06 NOTE — PCM.SN
- Free Text/Narrative


Note: 





Discussed with nursing that patient has some bright red blood around her normal 

stools yesterday and the day before.  She had 2 both days and 1 so far today, 

no pain with BM yesterday.  She had some pain today with the BM, concern would 

be a fissure or hemorroid.  I did put her pradaxa on hold for 48 hrs total 

yesterday and will do a rectal exam today or tomorrow.  By report she has never 

had a colonoscopy.  Discussed with nursing that pradaxa has nothing to do with 

her rates but is only for stroke prevention.  I will check lab work today and 

make sure there is no worsening anemia.  Hemoccult was actually negative but it 

was chino blood on the stool but not a large amount dripping into the toilet.

## 2017-04-07 RX ADMIN — Medication SCH MG: at 18:24

## 2017-04-07 RX ADMIN — Medication SCH MG: at 10:01

## 2017-04-07 RX ADMIN — Medication SCH MG: at 18:23

## 2017-04-07 RX ADMIN — Medication SCH UNIT: at 10:01

## 2017-04-07 RX ADMIN — MAGNESIUM OXIDE TAB 400 MG (241.3 MG ELEMENTAL MG) SCH TAB: 400 (241.3 MG) TAB at 18:23

## 2017-04-07 RX ADMIN — Medication SCH EACH: at 18:23

## 2017-04-07 RX ADMIN — LIDOCAINE HYDROCHLORIDE PRN APPLIC: 20 JELLY TOPICAL at 23:08

## 2017-04-07 RX ADMIN — LIDOCAINE HYDROCHLORIDE PRN APPLIC: 20 JELLY TOPICAL at 10:00

## 2017-04-07 RX ADMIN — Medication SCH EACH: at 06:21

## 2017-04-07 RX ADMIN — DEXTRAN 70, HYPROMELLOSE 2910 PRN EACH: 3; 1 SOLUTION/ DROPS OPHTHALMIC at 10:03

## 2017-04-08 RX ADMIN — LIDOCAINE HYDROCHLORIDE PRN APPLIC: 20 JELLY TOPICAL at 09:48

## 2017-04-08 RX ADMIN — Medication SCH MG: at 09:47

## 2017-04-08 RX ADMIN — LIDOCAINE HYDROCHLORIDE PRN APPLIC: 20 JELLY TOPICAL at 23:07

## 2017-04-08 RX ADMIN — Medication SCH EACH: at 18:05

## 2017-04-08 RX ADMIN — Medication SCH MG: at 18:05

## 2017-04-08 RX ADMIN — Medication SCH UNIT: at 09:46

## 2017-04-08 RX ADMIN — MAGNESIUM OXIDE TAB 400 MG (241.3 MG ELEMENTAL MG) SCH TAB: 400 (241.3 MG) TAB at 18:05

## 2017-04-09 RX ADMIN — Medication SCH UNIT: at 09:42

## 2017-04-09 RX ADMIN — Medication SCH MG: at 09:43

## 2017-04-09 RX ADMIN — ANORECTAL OINTMENT PRN APPLIC: 15.7; .44; 24; 20.6 OINTMENT TOPICAL at 09:43

## 2017-04-09 RX ADMIN — Medication SCH MG: at 18:03

## 2017-04-09 RX ADMIN — MAGNESIUM OXIDE TAB 400 MG (241.3 MG ELEMENTAL MG) SCH TAB: 400 (241.3 MG) TAB at 18:03

## 2017-04-09 RX ADMIN — Medication SCH EACH: at 18:04

## 2017-04-09 RX ADMIN — LIDOCAINE HYDROCHLORIDE PRN APPLIC: 20 JELLY TOPICAL at 09:43

## 2017-04-09 RX ADMIN — LIDOCAINE HYDROCHLORIDE PRN APPLIC: 20 JELLY TOPICAL at 23:05

## 2017-04-09 RX ADMIN — DEXTRAN 70, HYPROMELLOSE 2910 PRN EACH: 3; 1 SOLUTION/ DROPS OPHTHALMIC at 18:05

## 2017-04-09 RX ADMIN — Medication SCH EACH: at 06:18

## 2017-04-09 NOTE — PCM.SN
- Free Text/Narrative


Note: 





Still some local bleeding around stools, will check CBC tomorrow and restart 

Pradaxa if stable.

## 2017-04-10 RX ADMIN — Medication SCH EACH: at 06:01

## 2017-04-10 RX ADMIN — LIDOCAINE HYDROCHLORIDE PRN APPLIC: 20 JELLY TOPICAL at 09:40

## 2017-04-10 RX ADMIN — Medication SCH UNIT: at 09:39

## 2017-04-10 RX ADMIN — Medication SCH MG: at 09:39

## 2017-04-10 RX ADMIN — LIDOCAINE HYDROCHLORIDE PRN APPLIC: 20 JELLY TOPICAL at 22:55

## 2017-04-10 RX ADMIN — Medication SCH MG: at 18:09

## 2017-04-10 RX ADMIN — Medication SCH EACH: at 18:10

## 2017-04-10 RX ADMIN — MAGNESIUM OXIDE TAB 400 MG (241.3 MG ELEMENTAL MG) SCH TAB: 400 (241.3 MG) TAB at 18:10

## 2017-04-10 NOTE — PN
Progress Note for Medina Sheets  Date:  04/06/2017  Room #:

 

SUBJECTIVE:  This is an 87-year-old, on long-term swing bed due to impaired

mobility from arthritis.  About 3 days ago, she started having bright red blood

around her stools.  They had been loose or hard.  She was having no pain with

bowel movements.  It happened about twice per day.  There was no major loss of

blood.  She was not short of breath or having any other symptoms.  She has been

on Pradaxa long-term for atrial fibrillation.  She otherwise has not had other

bleeding problems.  No abdominal pain.  Last night, she did have some pain down

in her rectal area, sort of like a burning.  She has had quite a lot of issues

with vaginal irritation and rashes.  She has been following with OB/GYN for that

and has been using various clobetasol and Lotrisone type creams.  She otherwise

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  She has never

had a colonoscopy.

 

OBJECTIVE:  Vital Signs:  Her temperature today 97.8, pulse 80, blood pressure

137/70, respiratory rate 20, O2 of 94% on room air.

General:  She is in no acute distress.

Heart:  Irregularly irregular with murmur.

Lungs:  Sounds are clear to auscultation bilaterally without crackles or

wheezes.

Abdomen:  Positive bowel sounds.  Soft and nontender.

Extremities:  Warm and dry.  No edema.  Her gait is slow.  She uses a walker.

RECTAL:  She has rectal exam.  There is some soft stool in the vault.  No

masses.  No external hemorrhoids.  Just slight area of irritation anteriorly but

more probably going along with the lichen type changes that are in her venkatesh area

that are observed.  She did have blood around the stool, it is red to almost

burgundy but did not appear to be dripping or draining out in any aspect.

 

ASSESSMENT AND PLAN:

1. Painless rectal bleeding.  Blood is minimal and only with stools.  At this

    point, I discussed with her we will continue to hold Pradaxa to see if the

    bleeding quits.

2. Mild rectal irritation.  Do not see a hemorrhoid but we can try some

    hydrocortisone suppository to see if that helps.

3. Essential hypertension, controlled.

4. Osteoarthritis which impairs mobility.

5. Mild thrombocytopenia which we did check.  Lab work today and platelets are

    normal at 136.

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

 

PLAN:  At this point, the patient will be off Pradaxa, discussed with her there

is some slight increased risk of stroke but right now, with the bleeding that is

just the thing we need to do.  If further bleeding, we will repeat her CBC,

however, her hemoglobin was quite stable at 12.7.  Otherwise, we will get her in

to see a surgeon, looks like it might be a couple weeks.  They can do anoscopy.

I could always do an anoscopy as well but I would like to have her see a surgeon

in case there are some internal hemorrhoids or something that requires banding

or even a flexible sigmoidoscopy for further evaluation.  The patient seems

comfortable with this plan.

 

 

MKA:  04/06/2017 16:50:21  MODL:  04/06/2017 17:13:49

Job #:  [id number]

## 2017-04-11 RX ADMIN — Medication SCH EACH: at 18:31

## 2017-04-11 RX ADMIN — Medication SCH MG: at 18:30

## 2017-04-11 RX ADMIN — LIDOCAINE HYDROCHLORIDE PRN APPLIC: 20 JELLY TOPICAL at 22:00

## 2017-04-11 RX ADMIN — Medication SCH UNIT: at 10:11

## 2017-04-11 RX ADMIN — Medication SCH EACH: at 06:12

## 2017-04-11 RX ADMIN — Medication SCH MG: at 18:32

## 2017-04-11 RX ADMIN — Medication SCH MG: at 10:11

## 2017-04-11 RX ADMIN — MAGNESIUM OXIDE TAB 400 MG (241.3 MG ELEMENTAL MG) SCH TAB: 400 (241.3 MG) TAB at 18:32

## 2017-04-11 RX ADMIN — LIDOCAINE HYDROCHLORIDE PRN APPLIC: 20 JELLY TOPICAL at 10:13

## 2017-04-11 NOTE — PCM.SN
- Free Text/Narrative


Note: 





Rectal bleeding with BM's continue.  Discussed with patient after I observed 

the small amount of stool and blood in the toilet.  We will stop Pradaxa for 

now.  Surgeon will evaluate with anoscopy next week.  hgb check on Thursday 

again.

## 2017-04-12 RX ADMIN — LIDOCAINE HYDROCHLORIDE PRN APPLIC: 20 JELLY TOPICAL at 09:37

## 2017-04-12 RX ADMIN — Medication SCH: at 06:00

## 2017-04-12 RX ADMIN — Medication SCH UNIT: at 09:36

## 2017-04-12 RX ADMIN — Medication SCH MG: at 18:50

## 2017-04-12 RX ADMIN — Medication SCH EACH: at 05:58

## 2017-04-12 RX ADMIN — MAGNESIUM OXIDE TAB 400 MG (241.3 MG ELEMENTAL MG) SCH TAB: 400 (241.3 MG) TAB at 18:50

## 2017-04-12 RX ADMIN — Medication SCH MG: at 09:36

## 2017-04-12 RX ADMIN — Medication SCH MG: at 18:49

## 2017-04-12 RX ADMIN — Medication SCH EACH: at 18:49

## 2017-04-13 RX ADMIN — Medication SCH EACH: at 06:20

## 2017-04-13 RX ADMIN — Medication SCH MG: at 19:10

## 2017-04-13 RX ADMIN — LIDOCAINE HYDROCHLORIDE PRN APPLIC: 20 JELLY TOPICAL at 09:39

## 2017-04-13 RX ADMIN — Medication SCH MG: at 19:11

## 2017-04-13 RX ADMIN — Medication SCH MG: at 09:35

## 2017-04-13 RX ADMIN — Medication SCH EACH: at 19:11

## 2017-04-13 RX ADMIN — MAGNESIUM OXIDE TAB 400 MG (241.3 MG ELEMENTAL MG) SCH TAB: 400 (241.3 MG) TAB at 19:11

## 2017-04-13 RX ADMIN — Medication SCH UNIT: at 09:34

## 2017-04-14 RX ADMIN — LIDOCAINE HYDROCHLORIDE PRN APPLIC: 20 JELLY TOPICAL at 09:46

## 2017-04-14 RX ADMIN — DEXTRAN 70, HYPROMELLOSE 2910 PRN EACH: 3; 1 SOLUTION/ DROPS OPHTHALMIC at 18:01

## 2017-04-14 RX ADMIN — Medication SCH MG: at 18:00

## 2017-04-14 RX ADMIN — Medication SCH MG: at 09:43

## 2017-04-14 RX ADMIN — MAGNESIUM OXIDE TAB 400 MG (241.3 MG ELEMENTAL MG) SCH TAB: 400 (241.3 MG) TAB at 18:00

## 2017-04-14 RX ADMIN — Medication SCH UNIT: at 09:43

## 2017-04-14 RX ADMIN — Medication SCH EACH: at 18:00

## 2017-04-14 RX ADMIN — LIDOCAINE HYDROCHLORIDE PRN APPLIC: 20 JELLY TOPICAL at 23:22

## 2017-04-14 RX ADMIN — Medication SCH EACH: at 06:41

## 2017-04-14 RX ADMIN — Medication SCH MG: at 17:59

## 2017-04-15 RX ADMIN — Medication SCH MG: at 18:15

## 2017-04-15 RX ADMIN — LIDOCAINE HYDROCHLORIDE PRN APPLIC: 20 JELLY TOPICAL at 09:40

## 2017-04-15 RX ADMIN — Medication SCH EACH: at 18:16

## 2017-04-15 RX ADMIN — MAGNESIUM OXIDE TAB 400 MG (241.3 MG ELEMENTAL MG) SCH TAB: 400 (241.3 MG) TAB at 18:15

## 2017-04-15 RX ADMIN — Medication SCH MG: at 09:40

## 2017-04-15 RX ADMIN — LIDOCAINE HYDROCHLORIDE PRN APPLIC: 20 JELLY TOPICAL at 23:10

## 2017-04-15 RX ADMIN — Medication SCH MG: at 18:16

## 2017-04-15 RX ADMIN — Medication SCH UNIT: at 09:39

## 2017-04-16 RX ADMIN — LIDOCAINE HYDROCHLORIDE PRN APPLIC: 20 JELLY TOPICAL at 08:40

## 2017-04-16 RX ADMIN — MAGNESIUM OXIDE TAB 400 MG (241.3 MG ELEMENTAL MG) SCH TAB: 400 (241.3 MG) TAB at 18:15

## 2017-04-16 RX ADMIN — Medication SCH EACH: at 06:06

## 2017-04-16 RX ADMIN — LIDOCAINE HYDROCHLORIDE PRN APPLIC: 20 JELLY TOPICAL at 23:01

## 2017-04-16 RX ADMIN — Medication SCH MG: at 08:39

## 2017-04-16 RX ADMIN — Medication SCH UNIT: at 08:39

## 2017-04-16 RX ADMIN — Medication SCH MG: at 18:15

## 2017-04-16 RX ADMIN — Medication SCH MG: at 18:16

## 2017-04-16 RX ADMIN — Medication SCH EACH: at 18:15

## 2017-04-16 RX ADMIN — Medication SCH: at 09:20

## 2017-04-16 RX ADMIN — Medication SCH: at 09:19

## 2017-04-17 RX ADMIN — LIDOCAINE HYDROCHLORIDE PRN APPLIC: 20 JELLY TOPICAL at 10:56

## 2017-04-17 RX ADMIN — MAGNESIUM OXIDE TAB 400 MG (241.3 MG ELEMENTAL MG) SCH TAB: 400 (241.3 MG) TAB at 18:42

## 2017-04-17 RX ADMIN — Medication SCH EACH: at 06:02

## 2017-04-17 RX ADMIN — Medication SCH MG: at 18:42

## 2017-04-17 RX ADMIN — Medication SCH EACH: at 18:42

## 2017-04-17 RX ADMIN — Medication SCH UNIT: at 10:53

## 2017-04-17 RX ADMIN — LIDOCAINE HYDROCHLORIDE PRN APPLIC: 20 JELLY TOPICAL at 23:45

## 2017-04-17 RX ADMIN — Medication SCH MG: at 10:54

## 2017-04-18 RX ADMIN — DEXTRAN 70, HYPROMELLOSE 2910 PRN EACH: 3; 1 SOLUTION/ DROPS OPHTHALMIC at 20:33

## 2017-04-18 RX ADMIN — LIDOCAINE HYDROCHLORIDE PRN APPLIC: 20 JELLY TOPICAL at 22:35

## 2017-04-18 RX ADMIN — Medication SCH EACH: at 06:14

## 2017-04-18 RX ADMIN — Medication SCH MG: at 20:30

## 2017-04-18 RX ADMIN — Medication SCH MG: at 10:00

## 2017-04-18 RX ADMIN — Medication SCH EACH: at 20:29

## 2017-04-18 RX ADMIN — Medication SCH MG: at 20:29

## 2017-04-18 RX ADMIN — LIDOCAINE HYDROCHLORIDE PRN APPLIC: 20 JELLY TOPICAL at 10:01

## 2017-04-18 RX ADMIN — Medication SCH UNIT: at 10:00

## 2017-04-18 RX ADMIN — MAGNESIUM OXIDE TAB 400 MG (241.3 MG ELEMENTAL MG) SCH TAB: 400 (241.3 MG) TAB at 20:29

## 2017-04-19 RX ADMIN — Medication SCH EACH: at 18:03

## 2017-04-19 RX ADMIN — Medication SCH EACH: at 06:31

## 2017-04-19 RX ADMIN — LIDOCAINE HYDROCHLORIDE PRN APPLIC: 20 JELLY TOPICAL at 23:02

## 2017-04-19 RX ADMIN — LIDOCAINE HYDROCHLORIDE PRN APPLIC: 20 JELLY TOPICAL at 10:10

## 2017-04-19 RX ADMIN — MAGNESIUM OXIDE TAB 400 MG (241.3 MG ELEMENTAL MG) SCH TAB: 400 (241.3 MG) TAB at 18:03

## 2017-04-19 RX ADMIN — Medication SCH UNIT: at 10:11

## 2017-04-19 RX ADMIN — Medication SCH MG: at 18:03

## 2017-04-19 RX ADMIN — Medication SCH MG: at 10:11

## 2017-04-19 RX ADMIN — Medication SCH MG: at 18:02

## 2017-04-20 RX ADMIN — Medication SCH MG: at 18:34

## 2017-04-20 RX ADMIN — LIDOCAINE HYDROCHLORIDE PRN APPLIC: 20 JELLY TOPICAL at 09:45

## 2017-04-20 RX ADMIN — Medication SCH UNIT: at 09:42

## 2017-04-20 RX ADMIN — MAGNESIUM OXIDE TAB 400 MG (241.3 MG ELEMENTAL MG) SCH TAB: 400 (241.3 MG) TAB at 18:34

## 2017-04-20 RX ADMIN — Medication SCH MG: at 18:35

## 2017-04-20 RX ADMIN — Medication SCH EACH: at 18:34

## 2017-04-20 RX ADMIN — Medication SCH EACH: at 06:14

## 2017-04-20 RX ADMIN — Medication SCH MG: at 09:42

## 2017-04-21 RX ADMIN — LIDOCAINE HYDROCHLORIDE PRN APPLIC: 20 JELLY TOPICAL at 10:14

## 2017-04-21 RX ADMIN — Medication SCH EACH: at 18:05

## 2017-04-21 RX ADMIN — Medication SCH UNIT: at 10:12

## 2017-04-21 RX ADMIN — LIDOCAINE HYDROCHLORIDE PRN APPLIC: 20 JELLY TOPICAL at 23:10

## 2017-04-21 RX ADMIN — MAGNESIUM OXIDE TAB 400 MG (241.3 MG ELEMENTAL MG) SCH TAB: 400 (241.3 MG) TAB at 18:06

## 2017-04-21 RX ADMIN — Medication SCH EACH: at 05:59

## 2017-04-21 RX ADMIN — Medication SCH MG: at 10:11

## 2017-04-21 RX ADMIN — Medication SCH MG: at 18:04

## 2017-04-22 RX ADMIN — Medication SCH EACH: at 18:14

## 2017-04-22 RX ADMIN — LIDOCAINE HYDROCHLORIDE PRN APPLIC: 20 JELLY TOPICAL at 23:06

## 2017-04-22 RX ADMIN — Medication SCH MG: at 18:16

## 2017-04-22 RX ADMIN — Medication SCH UNIT: at 10:13

## 2017-04-22 RX ADMIN — Medication SCH MG: at 18:14

## 2017-04-22 RX ADMIN — LIDOCAINE HYDROCHLORIDE PRN APPLIC: 20 JELLY TOPICAL at 10:17

## 2017-04-22 RX ADMIN — Medication SCH MG: at 10:13

## 2017-04-22 RX ADMIN — MAGNESIUM OXIDE TAB 400 MG (241.3 MG ELEMENTAL MG) SCH TAB: 400 (241.3 MG) TAB at 18:15

## 2017-04-23 RX ADMIN — Medication SCH MG: at 09:59

## 2017-04-23 RX ADMIN — Medication SCH UNIT: at 09:58

## 2017-04-23 RX ADMIN — Medication SCH EACH: at 18:14

## 2017-04-23 RX ADMIN — Medication SCH EACH: at 06:15

## 2017-04-23 RX ADMIN — Medication SCH MG: at 18:14

## 2017-04-23 RX ADMIN — LIDOCAINE HYDROCHLORIDE PRN APPLIC: 20 JELLY TOPICAL at 22:45

## 2017-04-23 RX ADMIN — LIDOCAINE HYDROCHLORIDE PRN APPLIC: 20 JELLY TOPICAL at 10:00

## 2017-04-23 RX ADMIN — MAGNESIUM OXIDE TAB 400 MG (241.3 MG ELEMENTAL MG) SCH TAB: 400 (241.3 MG) TAB at 18:14

## 2017-04-24 RX ADMIN — LIDOCAINE HYDROCHLORIDE PRN APPLIC: 20 JELLY TOPICAL at 10:28

## 2017-04-24 RX ADMIN — Medication SCH MG: at 18:21

## 2017-04-24 RX ADMIN — LIDOCAINE HYDROCHLORIDE PRN APPLIC: 20 JELLY TOPICAL at 23:33

## 2017-04-24 RX ADMIN — MAGNESIUM OXIDE TAB 400 MG (241.3 MG ELEMENTAL MG) SCH TAB: 400 (241.3 MG) TAB at 18:21

## 2017-04-24 RX ADMIN — Medication SCH EACH: at 06:28

## 2017-04-24 RX ADMIN — Medication SCH EACH: at 18:20

## 2017-04-24 RX ADMIN — Medication SCH MG: at 10:28

## 2017-04-24 RX ADMIN — Medication SCH UNIT: at 10:29

## 2017-04-25 RX ADMIN — Medication SCH MG: at 18:23

## 2017-04-25 RX ADMIN — Medication SCH EACH: at 06:07

## 2017-04-25 RX ADMIN — LIDOCAINE HYDROCHLORIDE PRN APPLIC: 20 JELLY TOPICAL at 10:00

## 2017-04-25 RX ADMIN — MAGNESIUM OXIDE TAB 400 MG (241.3 MG ELEMENTAL MG) SCH TAB: 400 (241.3 MG) TAB at 18:23

## 2017-04-25 RX ADMIN — Medication SCH UNIT: at 09:57

## 2017-04-25 RX ADMIN — Medication SCH MG: at 09:58

## 2017-04-25 RX ADMIN — Medication SCH EACH: at 18:22

## 2017-04-25 RX ADMIN — LIDOCAINE HYDROCHLORIDE PRN APPLIC: 20 JELLY TOPICAL at 23:08

## 2017-04-26 RX ADMIN — LIDOCAINE HYDROCHLORIDE PRN APPLIC: 20 JELLY TOPICAL at 23:29

## 2017-04-26 RX ADMIN — Medication SCH MG: at 09:40

## 2017-04-26 RX ADMIN — Medication SCH UNIT: at 09:41

## 2017-04-26 RX ADMIN — Medication SCH MG: at 18:39

## 2017-04-26 RX ADMIN — Medication SCH EACH: at 18:39

## 2017-04-26 RX ADMIN — Medication SCH EACH: at 06:04

## 2017-04-26 RX ADMIN — LIDOCAINE HYDROCHLORIDE PRN APPLIC: 20 JELLY TOPICAL at 09:41

## 2017-04-26 RX ADMIN — MAGNESIUM OXIDE TAB 400 MG (241.3 MG ELEMENTAL MG) SCH TAB: 400 (241.3 MG) TAB at 18:39

## 2017-04-27 RX ADMIN — LIDOCAINE HYDROCHLORIDE PRN APPLIC: 20 JELLY TOPICAL at 23:30

## 2017-04-27 RX ADMIN — Medication SCH EACH: at 06:21

## 2017-04-27 RX ADMIN — Medication SCH MG: at 19:52

## 2017-04-27 RX ADMIN — MAGNESIUM OXIDE TAB 400 MG (241.3 MG ELEMENTAL MG) SCH TAB: 400 (241.3 MG) TAB at 19:51

## 2017-04-27 RX ADMIN — Medication SCH UNIT: at 10:30

## 2017-04-27 RX ADMIN — Medication SCH MG: at 10:30

## 2017-04-27 RX ADMIN — Medication SCH MG: at 19:51

## 2017-04-27 RX ADMIN — Medication SCH EACH: at 19:52

## 2017-04-28 RX ADMIN — Medication SCH UNIT: at 10:10

## 2017-04-28 RX ADMIN — DEXTRAN 70, HYPROMELLOSE 2910 PRN EACH: 3; 1 SOLUTION/ DROPS OPHTHALMIC at 18:42

## 2017-04-28 RX ADMIN — Medication SCH EACH: at 06:19

## 2017-04-28 RX ADMIN — LIDOCAINE HYDROCHLORIDE PRN APPLIC: 20 JELLY TOPICAL at 23:30

## 2017-04-28 RX ADMIN — Medication SCH MG: at 18:40

## 2017-04-28 RX ADMIN — Medication SCH MG: at 10:12

## 2017-04-28 RX ADMIN — MAGNESIUM OXIDE TAB 400 MG (241.3 MG ELEMENTAL MG) SCH TAB: 400 (241.3 MG) TAB at 18:40

## 2017-04-28 RX ADMIN — Medication SCH EACH: at 18:39

## 2017-04-28 RX ADMIN — LIDOCAINE HYDROCHLORIDE PRN APPLIC: 20 JELLY TOPICAL at 10:14

## 2017-04-29 RX ADMIN — LIDOCAINE HYDROCHLORIDE PRN APPLIC: 20 JELLY TOPICAL at 23:15

## 2017-04-29 RX ADMIN — MAGNESIUM OXIDE TAB 400 MG (241.3 MG ELEMENTAL MG) SCH TAB: 400 (241.3 MG) TAB at 18:19

## 2017-04-29 RX ADMIN — Medication SCH UNIT: at 10:57

## 2017-04-29 RX ADMIN — Medication SCH MG: at 10:58

## 2017-04-29 RX ADMIN — Medication SCH EACH: at 18:19

## 2017-04-29 RX ADMIN — Medication SCH MG: at 18:19

## 2017-04-29 RX ADMIN — LIDOCAINE HYDROCHLORIDE PRN APPLIC: 20 JELLY TOPICAL at 11:01

## 2017-04-30 RX ADMIN — Medication SCH EACH: at 19:20

## 2017-04-30 RX ADMIN — Medication SCH: at 22:09

## 2017-04-30 RX ADMIN — LIDOCAINE HYDROCHLORIDE PRN APPLIC: 20 JELLY TOPICAL at 10:10

## 2017-04-30 RX ADMIN — Medication SCH EACH: at 06:25

## 2017-04-30 RX ADMIN — Medication SCH MG: at 19:20

## 2017-04-30 RX ADMIN — Medication SCH MG: at 10:05

## 2017-04-30 RX ADMIN — MAGNESIUM OXIDE TAB 400 MG (241.3 MG ELEMENTAL MG) SCH TAB: 400 (241.3 MG) TAB at 19:20

## 2017-05-01 RX ADMIN — Medication SCH MG: at 19:21

## 2017-05-01 RX ADMIN — Medication SCH EACH: at 19:21

## 2017-05-01 RX ADMIN — Medication SCH EACH: at 06:27

## 2017-05-01 RX ADMIN — MAGNESIUM OXIDE TAB 400 MG (241.3 MG ELEMENTAL MG) SCH TAB: 400 (241.3 MG) TAB at 19:21

## 2017-05-01 RX ADMIN — Medication SCH UNIT: at 10:49

## 2017-05-01 RX ADMIN — Medication SCH MG: at 10:48

## 2017-05-01 RX ADMIN — Medication SCH MG: at 19:22

## 2017-05-02 RX ADMIN — MAGNESIUM OXIDE TAB 400 MG (241.3 MG ELEMENTAL MG) SCH TAB: 400 (241.3 MG) TAB at 18:40

## 2017-05-02 RX ADMIN — Medication SCH MG: at 18:40

## 2017-05-02 RX ADMIN — Medication SCH MG: at 18:41

## 2017-05-02 RX ADMIN — DEXTRAN 70, HYPROMELLOSE 2910 PRN EACH: 3; 1 SOLUTION/ DROPS OPHTHALMIC at 18:40

## 2017-05-02 RX ADMIN — Medication SCH MG: at 09:43

## 2017-05-02 RX ADMIN — Medication SCH EACH: at 18:40

## 2017-05-02 RX ADMIN — Medication SCH EACH: at 06:27

## 2017-05-02 RX ADMIN — Medication SCH UNIT: at 09:43

## 2017-05-02 RX ADMIN — Medication SCH MG: at 10:14

## 2017-05-03 RX ADMIN — Medication SCH MG: at 10:00

## 2017-05-03 RX ADMIN — Medication SCH UNIT: at 10:00

## 2017-05-03 RX ADMIN — Medication SCH EACH: at 18:14

## 2017-05-03 RX ADMIN — MAGNESIUM OXIDE TAB 400 MG (241.3 MG ELEMENTAL MG) SCH TAB: 400 (241.3 MG) TAB at 18:14

## 2017-05-03 RX ADMIN — Medication SCH EACH: at 06:35

## 2017-05-03 RX ADMIN — Medication SCH MG: at 10:01

## 2017-05-03 RX ADMIN — Medication SCH MG: at 18:14

## 2017-05-03 NOTE — PN
Progress Note for Medina Sheets  Date:  05/01/2017  Room #:

 

SUBJECTIVE:  This is an 87-year-old, who lives on long-term swing bed due to

painful osteoarthritis which limits her ability to do her ADLs.  She was having

some rectal bleeding with bowel movements.  Her Pradaxa was stopped.  She went

to see General surgery around 04/21 and there were some resolving internal

hemorrhoidal bleeding at that time.  She has not had any further problems since,

every once in a while she gets some irritation down there, but it is more

vaginal area and she does have a known history of chronic dermatitis and

vulvitis.  Otherwise, she has not had any chest pain, no shortness of breath.

No stroke-like symptoms.

 

OBJECTIVE:  Vital Signs:  Her weight is 78.9 kg.  Temperature 97.4, pulse 83,

blood pressure 126/71, respiratory rate 16, and O2 of 95% on room air.

General:  She is in no acute distress.

Heart:  Irregularly irregular with murmur.

Lungs:  Sounds are clear to auscultation bilaterally without crackles or

wheezes.

Abdomen:  Positive bowel sounds.  Soft and nontender.

Extremities:  Warm and dry.  No edema.

Mental Status:  She is alert and orientated x3.

 

ASSESSMENT AND PLAN:

1. Resolved internal hemorrhoidal bleeding.  She has seen the surgeon.  She

    has had anoscopy.  She declines for colonoscopy at this point.

2. Chronic vulvar and vaginal irritation.  She has followed up with Gynecology

    for that.  She has even had biopsy.  We will continue her creams.

3. Essential hypertension, controlled.

4. Atrial fibrillation, chronic.  She is on Pradaxa in the past, now that her

    bleeding has resolved, we will go ahead and restart that.

5. Osteoarthritis, which impairs mobility.

6. Mild anemia and thrombocytopenia by history.  We will repeat a CBC in 1

    month.  Last hemoglobin went up slightly to 11.9.

7. Hypothyroidism.

 

PLAN:  At this point, the patient will be restarted on Pradaxa.  We will

continue swing bed cares.  We will repeat lab work in 1 month.

 

MKA:  05/01/2017 17:01:04  MODL:  05/01/2017 18:25:02

Job #:  [id number]

## 2017-05-04 RX ADMIN — DEXTRAN 70, HYPROMELLOSE 2910 PRN EACH: 3; 1 SOLUTION/ DROPS OPHTHALMIC at 09:58

## 2017-05-04 RX ADMIN — MAGNESIUM OXIDE TAB 400 MG (241.3 MG ELEMENTAL MG) SCH TAB: 400 (241.3 MG) TAB at 18:00

## 2017-05-04 RX ADMIN — DEXTRAN 70, HYPROMELLOSE 2910 PRN EACH: 3; 1 SOLUTION/ DROPS OPHTHALMIC at 17:59

## 2017-05-04 RX ADMIN — Medication SCH MG: at 18:15

## 2017-05-04 RX ADMIN — Medication SCH MCG: at 06:28

## 2017-05-04 RX ADMIN — Medication SCH EACH: at 18:00

## 2017-05-04 RX ADMIN — Medication SCH UNIT: at 09:56

## 2017-05-04 RX ADMIN — Medication SCH MG: at 18:00

## 2017-05-04 RX ADMIN — Medication SCH MG: at 09:56

## 2017-05-04 RX ADMIN — Medication SCH MG: at 17:59

## 2017-05-05 RX ADMIN — Medication SCH EACH: at 19:51

## 2017-05-05 RX ADMIN — Medication SCH MCG: at 06:41

## 2017-05-05 RX ADMIN — Medication SCH MG: at 19:51

## 2017-05-05 RX ADMIN — Medication SCH MG: at 19:52

## 2017-05-05 RX ADMIN — Medication SCH UNIT: at 10:13

## 2017-05-05 RX ADMIN — Medication SCH MG: at 10:13

## 2017-05-05 RX ADMIN — MAGNESIUM OXIDE TAB 400 MG (241.3 MG ELEMENTAL MG) SCH TAB: 400 (241.3 MG) TAB at 19:51

## 2017-05-06 RX ADMIN — MAGNESIUM OXIDE TAB 400 MG (241.3 MG ELEMENTAL MG) SCH TAB: 400 (241.3 MG) TAB at 18:28

## 2017-05-06 RX ADMIN — Medication SCH MG: at 11:13

## 2017-05-06 RX ADMIN — Medication SCH EACH: at 18:28

## 2017-05-06 RX ADMIN — Medication SCH MG: at 11:12

## 2017-05-06 RX ADMIN — Medication SCH MG: at 18:29

## 2017-05-06 RX ADMIN — Medication SCH UNIT: at 11:12

## 2017-05-06 RX ADMIN — Medication SCH MG: at 18:28

## 2017-05-07 RX ADMIN — Medication SCH UNIT: at 08:59

## 2017-05-07 RX ADMIN — Medication SCH MG: at 08:59

## 2017-05-07 RX ADMIN — Medication SCH MG: at 09:00

## 2017-05-07 RX ADMIN — Medication SCH MCG: at 06:16

## 2017-05-07 RX ADMIN — Medication SCH MG: at 18:34

## 2017-05-07 RX ADMIN — Medication SCH EACH: at 18:34

## 2017-05-07 RX ADMIN — Medication SCH MG: at 18:33

## 2017-05-07 RX ADMIN — MAGNESIUM OXIDE TAB 400 MG (241.3 MG ELEMENTAL MG) SCH TAB: 400 (241.3 MG) TAB at 18:34

## 2017-05-08 RX ADMIN — DEXTRAN 70, HYPROMELLOSE 2910 PRN EACH: 3; 1 SOLUTION/ DROPS OPHTHALMIC at 18:15

## 2017-05-08 RX ADMIN — Medication SCH MCG: at 06:04

## 2017-05-08 RX ADMIN — MAGNESIUM OXIDE TAB 400 MG (241.3 MG ELEMENTAL MG) SCH TAB: 400 (241.3 MG) TAB at 18:13

## 2017-05-08 RX ADMIN — Medication SCH MG: at 09:30

## 2017-05-08 RX ADMIN — Medication SCH MG: at 18:13

## 2017-05-08 RX ADMIN — Medication SCH EACH: at 18:13

## 2017-05-08 RX ADMIN — Medication SCH UNIT: at 09:31

## 2017-05-08 RX ADMIN — Medication SCH MG: at 18:14

## 2017-05-09 RX ADMIN — Medication SCH EACH: at 18:06

## 2017-05-09 RX ADMIN — Medication SCH UNIT: at 09:17

## 2017-05-09 RX ADMIN — Medication SCH MCG: at 06:06

## 2017-05-09 RX ADMIN — Medication SCH MG: at 18:06

## 2017-05-09 RX ADMIN — Medication SCH MG: at 09:17

## 2017-05-09 RX ADMIN — MAGNESIUM OXIDE TAB 400 MG (241.3 MG ELEMENTAL MG) SCH TAB: 400 (241.3 MG) TAB at 18:06

## 2017-05-09 RX ADMIN — Medication SCH MG: at 09:16

## 2017-05-10 RX ADMIN — Medication SCH MCG: at 06:30

## 2017-05-10 RX ADMIN — Medication SCH UNIT: at 09:05

## 2017-05-10 RX ADMIN — Medication SCH MG: at 09:05

## 2017-05-10 RX ADMIN — Medication SCH MG: at 09:06

## 2017-05-10 RX ADMIN — Medication SCH MG: at 18:24

## 2017-05-10 RX ADMIN — Medication SCH MG: at 18:23

## 2017-05-10 RX ADMIN — Medication SCH EACH: at 18:23

## 2017-05-10 RX ADMIN — MAGNESIUM OXIDE TAB 400 MG (241.3 MG ELEMENTAL MG) SCH TAB: 400 (241.3 MG) TAB at 18:24

## 2017-05-11 RX ADMIN — Medication SCH MG: at 10:58

## 2017-05-11 RX ADMIN — Medication SCH MG: at 10:57

## 2017-05-11 RX ADMIN — MAGNESIUM OXIDE TAB 400 MG (241.3 MG ELEMENTAL MG) SCH TAB: 400 (241.3 MG) TAB at 18:35

## 2017-05-11 RX ADMIN — Medication SCH UNIT: at 10:57

## 2017-05-11 RX ADMIN — Medication SCH EACH: at 18:35

## 2017-05-11 RX ADMIN — Medication SCH MCG: at 06:15

## 2017-05-11 RX ADMIN — Medication SCH MG: at 18:35

## 2017-05-12 RX ADMIN — MAGNESIUM OXIDE TAB 400 MG (241.3 MG ELEMENTAL MG) SCH TAB: 400 (241.3 MG) TAB at 21:31

## 2017-05-12 RX ADMIN — Medication SCH MG: at 21:30

## 2017-05-12 RX ADMIN — Medication SCH MCG: at 06:16

## 2017-05-12 RX ADMIN — Medication SCH UNIT: at 09:41

## 2017-05-12 RX ADMIN — DEXTRAN 70, HYPROMELLOSE 2910 PRN EACH: 3; 1 SOLUTION/ DROPS OPHTHALMIC at 09:43

## 2017-05-12 RX ADMIN — Medication SCH EACH: at 21:31

## 2017-05-12 RX ADMIN — Medication SCH MG: at 21:31

## 2017-05-12 RX ADMIN — Medication SCH MG: at 09:41

## 2017-05-12 RX ADMIN — Medication SCH MG: at 21:32

## 2017-05-13 RX ADMIN — Medication SCH UNIT: at 09:49

## 2017-05-13 RX ADMIN — Medication SCH MG: at 09:50

## 2017-05-13 RX ADMIN — Medication SCH MG: at 21:07

## 2017-05-13 RX ADMIN — Medication SCH EACH: at 21:03

## 2017-05-13 RX ADMIN — Medication SCH MG: at 21:05

## 2017-05-13 RX ADMIN — MAGNESIUM OXIDE TAB 400 MG (241.3 MG ELEMENTAL MG) SCH TAB: 400 (241.3 MG) TAB at 21:05

## 2017-05-13 RX ADMIN — Medication SCH MG: at 09:49

## 2017-05-13 RX ADMIN — DEXTRAN 70, HYPROMELLOSE 2910 PRN EACH: 3; 1 SOLUTION/ DROPS OPHTHALMIC at 09:50

## 2017-05-13 RX ADMIN — Medication SCH MG: at 21:09

## 2017-05-14 RX ADMIN — Medication SCH MCG: at 06:17

## 2017-05-14 RX ADMIN — Medication SCH MG: at 09:05

## 2017-05-14 RX ADMIN — Medication SCH MG: at 18:01

## 2017-05-14 RX ADMIN — Medication SCH MG: at 18:02

## 2017-05-14 RX ADMIN — MAGNESIUM OXIDE TAB 400 MG (241.3 MG ELEMENTAL MG) SCH TAB: 400 (241.3 MG) TAB at 18:02

## 2017-05-14 RX ADMIN — Medication SCH UNIT: at 09:05

## 2017-05-14 RX ADMIN — Medication SCH EACH: at 18:02

## 2017-05-15 RX ADMIN — Medication SCH UNIT: at 09:52

## 2017-05-15 RX ADMIN — Medication SCH MG: at 18:47

## 2017-05-15 RX ADMIN — Medication SCH EACH: at 18:47

## 2017-05-15 RX ADMIN — Medication SCH MG: at 09:49

## 2017-05-15 RX ADMIN — MAGNESIUM OXIDE TAB 400 MG (241.3 MG ELEMENTAL MG) SCH TAB: 400 (241.3 MG) TAB at 18:47

## 2017-05-15 RX ADMIN — Medication SCH MG: at 18:48

## 2017-05-15 RX ADMIN — Medication SCH MCG: at 06:27

## 2017-05-15 RX ADMIN — Medication SCH MG: at 09:52

## 2017-05-16 RX ADMIN — Medication SCH EACH: at 18:00

## 2017-05-16 RX ADMIN — Medication SCH MG: at 18:00

## 2017-05-16 RX ADMIN — Medication SCH UNIT: at 10:30

## 2017-05-16 RX ADMIN — MAGNESIUM OXIDE TAB 400 MG (241.3 MG ELEMENTAL MG) SCH TAB: 400 (241.3 MG) TAB at 18:00

## 2017-05-16 RX ADMIN — Medication SCH MCG: at 06:03

## 2017-05-16 RX ADMIN — Medication SCH MG: at 10:31

## 2017-05-17 RX ADMIN — Medication SCH UNIT: at 10:36

## 2017-05-17 RX ADMIN — Medication SCH MG: at 10:37

## 2017-05-17 RX ADMIN — Medication SCH MCG: at 10:35

## 2017-05-17 RX ADMIN — Medication SCH MG: at 18:47

## 2017-05-17 RX ADMIN — DEXTRAN 70, HYPROMELLOSE 2910 PRN EACH: 3; 1 SOLUTION/ DROPS OPHTHALMIC at 10:42

## 2017-05-17 RX ADMIN — Medication SCH MG: at 18:48

## 2017-05-17 RX ADMIN — Medication SCH EACH: at 18:47

## 2017-05-17 RX ADMIN — Medication SCH MG: at 10:36

## 2017-05-17 RX ADMIN — MAGNESIUM OXIDE TAB 400 MG (241.3 MG ELEMENTAL MG) SCH TAB: 400 (241.3 MG) TAB at 18:48

## 2017-05-18 RX ADMIN — Medication SCH MG: at 18:32

## 2017-05-18 RX ADMIN — Medication SCH EACH: at 18:32

## 2017-05-18 RX ADMIN — Medication SCH UNIT: at 10:01

## 2017-05-18 RX ADMIN — Medication SCH MG: at 18:33

## 2017-05-18 RX ADMIN — MAGNESIUM OXIDE TAB 400 MG (241.3 MG ELEMENTAL MG) SCH TAB: 400 (241.3 MG) TAB at 18:32

## 2017-05-18 RX ADMIN — Medication SCH MCG: at 06:07

## 2017-05-18 RX ADMIN — Medication SCH MG: at 10:01

## 2017-05-19 RX ADMIN — Medication SCH EACH: at 18:10

## 2017-05-19 RX ADMIN — Medication SCH MG: at 18:10

## 2017-05-19 RX ADMIN — Medication SCH UNIT: at 10:58

## 2017-05-19 RX ADMIN — Medication SCH MG: at 18:09

## 2017-05-19 RX ADMIN — Medication SCH MG: at 10:58

## 2017-05-19 RX ADMIN — Medication SCH MCG: at 06:48

## 2017-05-19 RX ADMIN — MAGNESIUM OXIDE TAB 400 MG (241.3 MG ELEMENTAL MG) SCH TAB: 400 (241.3 MG) TAB at 18:10

## 2017-05-20 RX ADMIN — MAGNESIUM OXIDE TAB 400 MG (241.3 MG ELEMENTAL MG) SCH TAB: 400 (241.3 MG) TAB at 18:55

## 2017-05-20 RX ADMIN — Medication SCH UNIT: at 10:24

## 2017-05-20 RX ADMIN — Medication SCH MG: at 18:54

## 2017-05-20 RX ADMIN — Medication SCH MG: at 18:55

## 2017-05-20 RX ADMIN — Medication SCH MG: at 10:23

## 2017-05-20 RX ADMIN — Medication SCH EACH: at 18:55

## 2017-05-21 RX ADMIN — MAGNESIUM OXIDE TAB 400 MG (241.3 MG ELEMENTAL MG) SCH TAB: 400 (241.3 MG) TAB at 19:36

## 2017-05-21 RX ADMIN — Medication SCH UNIT: at 10:27

## 2017-05-21 RX ADMIN — Medication SCH MG: at 10:28

## 2017-05-21 RX ADMIN — DEXTRAN 70, HYPROMELLOSE 2910 PRN EACH: 3; 1 SOLUTION/ DROPS OPHTHALMIC at 10:33

## 2017-05-21 RX ADMIN — Medication SCH MCG: at 06:55

## 2017-05-21 RX ADMIN — Medication SCH MG: at 10:27

## 2017-05-21 RX ADMIN — Medication SCH MG: at 19:36

## 2017-05-21 RX ADMIN — Medication SCH EACH: at 19:36

## 2017-05-22 RX ADMIN — Medication SCH MG: at 09:50

## 2017-05-22 RX ADMIN — Medication SCH UNIT: at 09:51

## 2017-05-22 RX ADMIN — Medication SCH MG: at 18:08

## 2017-05-22 RX ADMIN — MAGNESIUM OXIDE TAB 400 MG (241.3 MG ELEMENTAL MG) SCH TAB: 400 (241.3 MG) TAB at 18:10

## 2017-05-22 RX ADMIN — Medication SCH MG: at 18:09

## 2017-05-22 RX ADMIN — Medication SCH EACH: at 18:09

## 2017-05-22 RX ADMIN — Medication SCH MCG: at 06:30

## 2017-05-22 RX ADMIN — Medication SCH MG: at 09:52

## 2017-05-22 RX ADMIN — Medication SCH MG: at 18:10

## 2017-05-23 RX ADMIN — Medication SCH UNIT: at 10:37

## 2017-05-23 RX ADMIN — Medication SCH MCG: at 06:05

## 2017-05-23 RX ADMIN — Medication SCH MG: at 18:25

## 2017-05-23 RX ADMIN — Medication SCH MG: at 10:38

## 2017-05-23 RX ADMIN — MAGNESIUM OXIDE TAB 400 MG (241.3 MG ELEMENTAL MG) SCH TAB: 400 (241.3 MG) TAB at 18:26

## 2017-05-23 RX ADMIN — Medication SCH MG: at 10:40

## 2017-05-23 RX ADMIN — Medication SCH EACH: at 18:25

## 2017-05-23 RX ADMIN — DEXTRAN 70, HYPROMELLOSE 2910 PRN EACH: 3; 1 SOLUTION/ DROPS OPHTHALMIC at 10:41

## 2017-05-24 RX ADMIN — Medication SCH: at 18:45

## 2017-05-24 RX ADMIN — Medication SCH UNIT: at 10:33

## 2017-05-24 RX ADMIN — Medication SCH MG: at 10:34

## 2017-05-24 RX ADMIN — Medication SCH MG: at 18:45

## 2017-05-24 RX ADMIN — Medication SCH MG: at 18:44

## 2017-05-24 RX ADMIN — Medication SCH MG: at 10:37

## 2017-05-24 RX ADMIN — MAGNESIUM OXIDE TAB 400 MG (241.3 MG ELEMENTAL MG) SCH TAB: 400 (241.3 MG) TAB at 18:45

## 2017-05-24 RX ADMIN — Medication SCH MCG: at 06:06

## 2017-05-24 RX ADMIN — Medication SCH EACH: at 18:45

## 2017-05-25 RX ADMIN — Medication SCH UNIT: at 09:10

## 2017-05-25 RX ADMIN — Medication SCH EACH: at 19:50

## 2017-05-25 RX ADMIN — MAGNESIUM OXIDE TAB 400 MG (241.3 MG ELEMENTAL MG) SCH TAB: 400 (241.3 MG) TAB at 19:50

## 2017-05-25 RX ADMIN — DEXTRAN 70, HYPROMELLOSE 2910 PRN EACH: 3; 1 SOLUTION/ DROPS OPHTHALMIC at 09:18

## 2017-05-25 RX ADMIN — Medication SCH MG: at 09:17

## 2017-05-25 RX ADMIN — Medication SCH MCG: at 06:13

## 2017-05-25 RX ADMIN — Medication SCH MG: at 09:10

## 2017-05-25 RX ADMIN — Medication SCH MG: at 19:50

## 2017-05-26 RX ADMIN — Medication SCH MG: at 10:56

## 2017-05-26 RX ADMIN — Medication SCH UNIT: at 10:56

## 2017-05-26 RX ADMIN — Medication SCH EACH: at 19:28

## 2017-05-26 RX ADMIN — Medication SCH MCG: at 07:03

## 2017-05-26 RX ADMIN — MAGNESIUM OXIDE TAB 400 MG (241.3 MG ELEMENTAL MG) SCH TAB: 400 (241.3 MG) TAB at 19:28

## 2017-05-26 RX ADMIN — Medication SCH MG: at 19:28

## 2017-05-26 RX ADMIN — Medication SCH MG: at 19:27

## 2017-05-27 RX ADMIN — Medication SCH EACH: at 18:29

## 2017-05-27 RX ADMIN — Medication SCH MG: at 09:52

## 2017-05-27 RX ADMIN — Medication SCH MG: at 18:30

## 2017-05-27 RX ADMIN — MAGNESIUM OXIDE TAB 400 MG (241.3 MG ELEMENTAL MG) SCH TAB: 400 (241.3 MG) TAB at 18:30

## 2017-05-27 RX ADMIN — Medication SCH UNIT: at 09:52

## 2017-05-28 RX ADMIN — Medication SCH MCG: at 07:00

## 2017-05-28 RX ADMIN — Medication SCH MG: at 18:32

## 2017-05-28 RX ADMIN — Medication SCH UNIT: at 10:50

## 2017-05-28 RX ADMIN — MAGNESIUM OXIDE TAB 400 MG (241.3 MG ELEMENTAL MG) SCH TAB: 400 (241.3 MG) TAB at 18:31

## 2017-05-28 RX ADMIN — Medication SCH MG: at 10:50

## 2017-05-28 RX ADMIN — Medication SCH EACH: at 18:31

## 2017-05-28 RX ADMIN — Medication SCH MG: at 18:31

## 2017-05-29 RX ADMIN — Medication SCH MG: at 10:26

## 2017-05-29 RX ADMIN — Medication SCH MG: at 18:15

## 2017-05-29 RX ADMIN — Medication SCH UNIT: at 10:26

## 2017-05-29 RX ADMIN — Medication SCH MG: at 10:25

## 2017-05-29 RX ADMIN — Medication SCH MCG: at 06:35

## 2017-05-29 RX ADMIN — MAGNESIUM OXIDE TAB 400 MG (241.3 MG ELEMENTAL MG) SCH TAB: 400 (241.3 MG) TAB at 18:15

## 2017-05-29 RX ADMIN — Medication SCH MG: at 18:14

## 2017-05-29 RX ADMIN — Medication SCH EACH: at 18:15

## 2017-05-30 RX ADMIN — Medication SCH MCG: at 06:17

## 2017-05-30 RX ADMIN — Medication SCH MG: at 09:39

## 2017-05-30 RX ADMIN — Medication SCH UNIT: at 09:37

## 2017-05-30 RX ADMIN — Medication SCH MG: at 18:09

## 2017-05-30 RX ADMIN — MAGNESIUM OXIDE TAB 400 MG (241.3 MG ELEMENTAL MG) SCH TAB: 400 (241.3 MG) TAB at 18:09

## 2017-05-30 RX ADMIN — Medication SCH MG: at 18:10

## 2017-05-30 RX ADMIN — Medication SCH EACH: at 18:09

## 2017-05-30 RX ADMIN — Medication SCH MG: at 09:37

## 2017-05-31 RX ADMIN — Medication SCH UNIT: at 09:29

## 2017-05-31 RX ADMIN — Medication SCH MCG: at 06:05

## 2017-05-31 RX ADMIN — MAGNESIUM OXIDE TAB 400 MG (241.3 MG ELEMENTAL MG) SCH TAB: 400 (241.3 MG) TAB at 18:26

## 2017-05-31 RX ADMIN — Medication SCH MG: at 18:27

## 2017-05-31 RX ADMIN — Medication SCH MG: at 09:30

## 2017-05-31 RX ADMIN — Medication SCH EACH: at 18:26

## 2017-05-31 RX ADMIN — Medication SCH MG: at 11:48

## 2017-05-31 RX ADMIN — Medication SCH MG: at 18:25

## 2017-06-01 RX ADMIN — Medication SCH EACH: at 18:30

## 2017-06-01 RX ADMIN — Medication SCH UNIT: at 09:20

## 2017-06-01 RX ADMIN — MAGNESIUM OXIDE TAB 400 MG (241.3 MG ELEMENTAL MG) SCH TAB: 400 (241.3 MG) TAB at 18:30

## 2017-06-01 RX ADMIN — Medication SCH MCG: at 06:06

## 2017-06-01 RX ADMIN — Medication SCH MG: at 18:29

## 2017-06-01 RX ADMIN — Medication SCH MG: at 09:21

## 2017-06-01 RX ADMIN — Medication SCH MG: at 09:20

## 2017-06-01 RX ADMIN — Medication SCH MG: at 18:39

## 2017-06-02 RX ADMIN — DEXTRAN 70, HYPROMELLOSE 2910 PRN EACH: 3; 1 SOLUTION/ DROPS OPHTHALMIC at 10:28

## 2017-06-02 RX ADMIN — Medication SCH MG: at 10:23

## 2017-06-02 RX ADMIN — Medication SCH MCG: at 06:10

## 2017-06-02 RX ADMIN — Medication SCH EACH: at 18:00

## 2017-06-02 RX ADMIN — Medication SCH MG: at 18:00

## 2017-06-02 RX ADMIN — Medication SCH UNIT: at 10:22

## 2017-06-02 RX ADMIN — Medication SCH MG: at 18:02

## 2017-06-02 RX ADMIN — MAGNESIUM OXIDE TAB 400 MG (241.3 MG ELEMENTAL MG) SCH TAB: 400 (241.3 MG) TAB at 18:00

## 2017-06-02 NOTE — PN
Progress Note for JENIFER SHEETS  Date:  06/01/2017  Room #:

 

SUBJECTIVE:  This is an 87-year-old on long-term swing bed due to impaired

mobility from osteoarthritis.  The patient is having increasing difficulty

getting out of a chair.  She is requiring continued assistance by staff.  She

does have a lift chair that she would like to brought in.  She cannot walk very

far and she has quite a bit of left knee pain.  She states when she gets up,

then she knows if her knee is going to be stable or not.  Otherwise, she has not

had any other issues recently.  We repeated her lab work this week and her

hemoglobin improved back up to 13.1, that was checked after she had some rectal

bleeding.  She was off Pradaxa for a while, but is now back on and doing well.

 

OBJECTIVE:  Vital Signs:  Her temperature is 97.9, pulse 64, blood pressure

132/63, respiratory rate 16, and O2 of 92% on room air.

General:  She is in no acute distress.

Lungs:  Sounds are clear to auscultation bilaterally without crackles or

wheezes.

Heart:  Does have a murmur noted.

Extremities:  Warm and dry.  No edema.

Mental Status:  She is alert and oriented x3.

 

ASSESSMENT AND PLAN:

1. Impaired mobility due to osteoarthritis, severe in the knees.  We will get

    her up and working with therapies.  Did mention that she can come over to

    the clinic for a knee injection if pain were to be an issue.  She thinks

    she will just monitor and wait at this time.

2. Chronic vulvar and vaginal irritation.  Seems to be stable on her current

    regimen.

3. Essential hypertension, controlled.

4. Atrial fibrillation, paroxysmal, seems to be in a sinus rhythm.  She is on

    Pradaxa for stroke prevention.

5. Mild anemia, resolved, on recent CBC.  No lab work due.

6. Hypothyroidism.

 

PLAN:  At this point, the patient will continue swing bed cares.  She should not

require any blood work until the fall around October.  We will keep medications

the same.  We will have her seen by therapies.

 

 

BENITEZA:  06/01/2017 12:36:59  MODL:  06/01/2017 12:49:35

Job #:  [id number]

## 2017-06-03 RX ADMIN — Medication SCH MG: at 10:14

## 2017-06-03 RX ADMIN — Medication SCH EACH: at 18:05

## 2017-06-03 RX ADMIN — Medication SCH UNIT: at 10:14

## 2017-06-03 RX ADMIN — Medication SCH MG: at 10:16

## 2017-06-03 RX ADMIN — Medication SCH MG: at 18:05

## 2017-06-03 RX ADMIN — MAGNESIUM OXIDE TAB 400 MG (241.3 MG ELEMENTAL MG) SCH TAB: 400 (241.3 MG) TAB at 18:05

## 2017-06-04 RX ADMIN — Medication SCH MCG: at 06:40

## 2017-06-04 RX ADMIN — Medication SCH UNIT: at 10:15

## 2017-06-04 RX ADMIN — MAGNESIUM OXIDE TAB 400 MG (241.3 MG ELEMENTAL MG) SCH TAB: 400 (241.3 MG) TAB at 18:12

## 2017-06-04 RX ADMIN — Medication SCH EACH: at 18:13

## 2017-06-04 RX ADMIN — Medication SCH MG: at 18:12

## 2017-06-04 RX ADMIN — Medication SCH MG: at 10:15

## 2017-06-04 RX ADMIN — Medication SCH MG: at 18:13

## 2017-06-05 RX ADMIN — Medication SCH MG: at 18:31

## 2017-06-05 RX ADMIN — Medication SCH MG: at 10:39

## 2017-06-05 RX ADMIN — Medication SCH MG: at 10:37

## 2017-06-05 RX ADMIN — DEXTRAN 70, HYPROMELLOSE 2910 PRN EACH: 3; 1 SOLUTION/ DROPS OPHTHALMIC at 10:41

## 2017-06-05 RX ADMIN — Medication SCH UNIT: at 10:37

## 2017-06-05 RX ADMIN — Medication SCH EACH: at 18:29

## 2017-06-05 RX ADMIN — Medication SCH MCG: at 06:21

## 2017-06-05 RX ADMIN — Medication SCH MG: at 18:29

## 2017-06-05 RX ADMIN — MAGNESIUM OXIDE TAB 400 MG (241.3 MG ELEMENTAL MG) SCH TAB: 400 (241.3 MG) TAB at 18:29

## 2017-06-06 RX ADMIN — Medication SCH MG: at 10:09

## 2017-06-06 RX ADMIN — MAGNESIUM OXIDE TAB 400 MG (241.3 MG ELEMENTAL MG) SCH TAB: 400 (241.3 MG) TAB at 18:28

## 2017-06-06 RX ADMIN — Medication SCH MG: at 10:10

## 2017-06-06 RX ADMIN — Medication SCH MCG: at 06:00

## 2017-06-06 RX ADMIN — Medication SCH EACH: at 18:28

## 2017-06-06 RX ADMIN — Medication SCH UNIT: at 10:08

## 2017-06-06 RX ADMIN — Medication SCH MG: at 18:29

## 2017-06-06 RX ADMIN — Medication SCH MG: at 18:28

## 2017-06-07 RX ADMIN — MAGNESIUM OXIDE TAB 400 MG (241.3 MG ELEMENTAL MG) SCH TAB: 400 (241.3 MG) TAB at 18:17

## 2017-06-07 RX ADMIN — Medication SCH EACH: at 18:18

## 2017-06-07 RX ADMIN — Medication SCH UNIT: at 10:27

## 2017-06-07 RX ADMIN — Medication SCH MG: at 18:17

## 2017-06-07 RX ADMIN — Medication SCH MCG: at 06:03

## 2017-06-07 RX ADMIN — Medication SCH MG: at 10:28

## 2017-06-07 RX ADMIN — Medication SCH MG: at 10:27

## 2017-06-08 RX ADMIN — Medication SCH MCG: at 06:11

## 2017-06-08 RX ADMIN — Medication SCH MG: at 19:47

## 2017-06-08 RX ADMIN — Medication SCH UNIT: at 09:50

## 2017-06-08 RX ADMIN — MAGNESIUM OXIDE TAB 400 MG (241.3 MG ELEMENTAL MG) SCH TAB: 400 (241.3 MG) TAB at 19:48

## 2017-06-08 RX ADMIN — Medication SCH EACH: at 19:46

## 2017-06-08 RX ADMIN — Medication SCH MG: at 09:49

## 2017-06-09 RX ADMIN — MAGNESIUM OXIDE TAB 400 MG (241.3 MG ELEMENTAL MG) SCH TAB: 400 (241.3 MG) TAB at 18:01

## 2017-06-09 RX ADMIN — Medication SCH MG: at 18:01

## 2017-06-09 RX ADMIN — Medication SCH MG: at 10:23

## 2017-06-09 RX ADMIN — Medication SCH UNIT: at 10:22

## 2017-06-09 RX ADMIN — Medication SCH EACH: at 18:01

## 2017-06-09 RX ADMIN — Medication SCH MG: at 10:22

## 2017-06-09 RX ADMIN — Medication SCH MCG: at 06:07

## 2017-06-09 RX ADMIN — DEXTRAN 70, HYPROMELLOSE 2910 PRN EACH: 3; 1 SOLUTION/ DROPS OPHTHALMIC at 10:41

## 2017-06-10 RX ADMIN — Medication SCH EACH: at 18:24

## 2017-06-10 RX ADMIN — Medication SCH MG: at 18:23

## 2017-06-10 RX ADMIN — Medication SCH MG: at 09:30

## 2017-06-10 RX ADMIN — Medication SCH UNIT: at 09:30

## 2017-06-10 RX ADMIN — MAGNESIUM OXIDE TAB 400 MG (241.3 MG ELEMENTAL MG) SCH TAB: 400 (241.3 MG) TAB at 18:24

## 2017-06-11 RX ADMIN — Medication SCH UNIT: at 09:43

## 2017-06-11 RX ADMIN — MAGNESIUM OXIDE TAB 400 MG (241.3 MG ELEMENTAL MG) SCH TAB: 400 (241.3 MG) TAB at 18:17

## 2017-06-11 RX ADMIN — Medication SCH EACH: at 18:16

## 2017-06-11 RX ADMIN — Medication SCH MG: at 18:16

## 2017-06-11 RX ADMIN — Medication SCH MG: at 09:43

## 2017-06-11 RX ADMIN — Medication SCH MG: at 18:17

## 2017-06-11 RX ADMIN — Medication SCH MCG: at 06:05

## 2017-06-12 RX ADMIN — MAGNESIUM OXIDE TAB 400 MG (241.3 MG ELEMENTAL MG) SCH TAB: 400 (241.3 MG) TAB at 18:23

## 2017-06-12 RX ADMIN — Medication SCH MG: at 10:34

## 2017-06-12 RX ADMIN — Medication SCH MG: at 10:33

## 2017-06-12 RX ADMIN — Medication SCH MG: at 18:23

## 2017-06-12 RX ADMIN — Medication SCH EACH: at 18:23

## 2017-06-12 RX ADMIN — Medication SCH UNIT: at 10:33

## 2017-06-12 RX ADMIN — Medication SCH MCG: at 07:38

## 2017-06-13 RX ADMIN — Medication SCH MG: at 18:06

## 2017-06-13 RX ADMIN — Medication SCH EACH: at 18:05

## 2017-06-13 RX ADMIN — Medication SCH MCG: at 05:59

## 2017-06-13 RX ADMIN — Medication SCH MG: at 09:59

## 2017-06-13 RX ADMIN — MAGNESIUM OXIDE TAB 400 MG (241.3 MG ELEMENTAL MG) SCH TAB: 400 (241.3 MG) TAB at 18:05

## 2017-06-13 RX ADMIN — Medication SCH MG: at 18:05

## 2017-06-13 RX ADMIN — Medication SCH UNIT: at 09:58

## 2017-06-14 RX ADMIN — Medication SCH UNIT: at 10:48

## 2017-06-14 RX ADMIN — Medication SCH MCG: at 06:10

## 2017-06-14 RX ADMIN — Medication SCH EACH: at 18:29

## 2017-06-14 RX ADMIN — Medication SCH MG: at 18:30

## 2017-06-14 RX ADMIN — Medication SCH MG: at 18:29

## 2017-06-14 RX ADMIN — MAGNESIUM OXIDE TAB 400 MG (241.3 MG ELEMENTAL MG) SCH TAB: 400 (241.3 MG) TAB at 18:29

## 2017-06-14 RX ADMIN — Medication SCH MG: at 10:48

## 2017-06-15 RX ADMIN — MAGNESIUM OXIDE TAB 400 MG (241.3 MG ELEMENTAL MG) SCH TAB: 400 (241.3 MG) TAB at 18:24

## 2017-06-15 RX ADMIN — Medication SCH MG: at 10:37

## 2017-06-15 RX ADMIN — Medication SCH MG: at 18:24

## 2017-06-15 RX ADMIN — Medication SCH EACH: at 18:25

## 2017-06-15 RX ADMIN — Medication SCH UNIT: at 10:37

## 2017-06-15 RX ADMIN — Medication SCH MG: at 10:38

## 2017-06-15 RX ADMIN — Medication SCH MCG: at 06:09

## 2017-06-15 RX ADMIN — Medication SCH MG: at 18:25

## 2017-06-16 RX ADMIN — Medication SCH MG: at 09:55

## 2017-06-16 RX ADMIN — Medication SCH EACH: at 18:09

## 2017-06-16 RX ADMIN — MAGNESIUM OXIDE TAB 400 MG (241.3 MG ELEMENTAL MG) SCH TAB: 400 (241.3 MG) TAB at 18:09

## 2017-06-16 RX ADMIN — Medication SCH MCG: at 06:09

## 2017-06-16 RX ADMIN — Medication SCH MG: at 18:08

## 2017-06-16 RX ADMIN — Medication SCH MG: at 09:56

## 2017-06-16 RX ADMIN — Medication SCH UNIT: at 09:56

## 2017-06-17 RX ADMIN — Medication SCH MG: at 09:41

## 2017-06-17 RX ADMIN — Medication SCH EACH: at 18:46

## 2017-06-17 RX ADMIN — Medication SCH MG: at 18:46

## 2017-06-17 RX ADMIN — Medication SCH UNIT: at 09:43

## 2017-06-17 RX ADMIN — MAGNESIUM OXIDE TAB 400 MG (241.3 MG ELEMENTAL MG) SCH TAB: 400 (241.3 MG) TAB at 18:45

## 2017-06-17 RX ADMIN — Medication SCH MG: at 18:47

## 2017-06-17 RX ADMIN — Medication SCH MG: at 09:39

## 2017-06-18 RX ADMIN — Medication SCH MCG: at 06:02

## 2017-06-18 RX ADMIN — MAGNESIUM OXIDE TAB 400 MG (241.3 MG ELEMENTAL MG) SCH TAB: 400 (241.3 MG) TAB at 18:18

## 2017-06-18 RX ADMIN — Medication SCH MG: at 18:18

## 2017-06-18 RX ADMIN — Medication SCH MG: at 09:26

## 2017-06-18 RX ADMIN — Medication SCH EACH: at 18:19

## 2017-06-18 RX ADMIN — Medication SCH MG: at 18:17

## 2017-06-18 RX ADMIN — Medication SCH MG: at 09:25

## 2017-06-18 RX ADMIN — Medication SCH UNIT: at 09:27

## 2017-06-19 RX ADMIN — Medication SCH MCG: at 06:22

## 2017-06-19 RX ADMIN — Medication SCH MG: at 10:19

## 2017-06-19 RX ADMIN — MAGNESIUM OXIDE TAB 400 MG (241.3 MG ELEMENTAL MG) SCH TAB: 400 (241.3 MG) TAB at 18:20

## 2017-06-19 RX ADMIN — Medication SCH MG: at 18:20

## 2017-06-19 RX ADMIN — Medication SCH EACH: at 18:19

## 2017-06-19 RX ADMIN — Medication SCH MG: at 10:18

## 2017-06-19 RX ADMIN — Medication SCH UNIT: at 10:19

## 2017-06-20 RX ADMIN — Medication SCH MG: at 10:39

## 2017-06-20 RX ADMIN — DEXTRAN 70, HYPROMELLOSE 2910 PRN EACH: 3; 1 SOLUTION/ DROPS OPHTHALMIC at 10:41

## 2017-06-20 RX ADMIN — MAGNESIUM OXIDE TAB 400 MG (241.3 MG ELEMENTAL MG) SCH TAB: 400 (241.3 MG) TAB at 18:52

## 2017-06-20 RX ADMIN — Medication SCH MG: at 18:52

## 2017-06-20 RX ADMIN — Medication SCH MG: at 10:40

## 2017-06-20 RX ADMIN — Medication SCH EACH: at 18:53

## 2017-06-20 RX ADMIN — Medication SCH MCG: at 06:40

## 2017-06-20 RX ADMIN — Medication SCH UNIT: at 10:39

## 2017-06-21 RX ADMIN — MAGNESIUM OXIDE TAB 400 MG (241.3 MG ELEMENTAL MG) SCH TAB: 400 (241.3 MG) TAB at 18:14

## 2017-06-21 RX ADMIN — Medication SCH MG: at 18:15

## 2017-06-21 RX ADMIN — Medication SCH UNIT: at 10:28

## 2017-06-21 RX ADMIN — Medication SCH MG: at 10:27

## 2017-06-21 RX ADMIN — Medication SCH MCG: at 06:17

## 2017-06-21 RX ADMIN — Medication SCH EACH: at 18:14

## 2017-06-21 RX ADMIN — Medication SCH MG: at 10:28

## 2017-06-21 RX ADMIN — Medication SCH MG: at 18:14

## 2017-06-22 RX ADMIN — Medication SCH MG: at 11:00

## 2017-06-22 RX ADMIN — Medication SCH MCG: at 06:16

## 2017-06-22 RX ADMIN — MAGNESIUM OXIDE TAB 400 MG (241.3 MG ELEMENTAL MG) SCH TAB: 400 (241.3 MG) TAB at 18:08

## 2017-06-22 RX ADMIN — Medication SCH EACH: at 18:08

## 2017-06-22 RX ADMIN — Medication SCH MG: at 10:59

## 2017-06-22 RX ADMIN — Medication SCH UNIT: at 11:00

## 2017-06-22 RX ADMIN — Medication SCH MG: at 18:08

## 2017-06-23 RX ADMIN — Medication SCH UNIT: at 09:59

## 2017-06-23 RX ADMIN — Medication SCH EACH: at 18:17

## 2017-06-23 RX ADMIN — Medication SCH MCG: at 06:08

## 2017-06-23 RX ADMIN — Medication SCH MG: at 09:58

## 2017-06-23 RX ADMIN — Medication SCH MG: at 09:59

## 2017-06-23 RX ADMIN — Medication SCH MG: at 18:16

## 2017-06-23 RX ADMIN — MAGNESIUM OXIDE TAB 400 MG (241.3 MG ELEMENTAL MG) SCH TAB: 400 (241.3 MG) TAB at 18:17

## 2017-06-24 RX ADMIN — MAGNESIUM OXIDE TAB 400 MG (241.3 MG ELEMENTAL MG) SCH TAB: 400 (241.3 MG) TAB at 18:44

## 2017-06-24 RX ADMIN — Medication SCH MG: at 09:57

## 2017-06-24 RX ADMIN — Medication SCH MG: at 18:45

## 2017-06-24 RX ADMIN — LIDOCAINE HYDROCHLORIDE PRN APPLIC: 20 JELLY TOPICAL at 10:07

## 2017-06-24 RX ADMIN — Medication SCH EACH: at 18:45

## 2017-06-24 RX ADMIN — Medication SCH MG: at 09:56

## 2017-06-24 RX ADMIN — Medication SCH UNIT: at 09:57

## 2017-06-24 RX ADMIN — Medication SCH MG: at 18:44

## 2017-06-25 RX ADMIN — Medication SCH MG: at 09:19

## 2017-06-25 RX ADMIN — Medication SCH EACH: at 18:20

## 2017-06-25 RX ADMIN — Medication SCH MG: at 18:20

## 2017-06-25 RX ADMIN — Medication SCH MCG: at 06:45

## 2017-06-25 RX ADMIN — MAGNESIUM OXIDE TAB 400 MG (241.3 MG ELEMENTAL MG) SCH TAB: 400 (241.3 MG) TAB at 18:20

## 2017-06-25 RX ADMIN — Medication SCH UNIT: at 09:19

## 2017-06-26 RX ADMIN — Medication SCH MG: at 17:59

## 2017-06-26 RX ADMIN — Medication SCH UNIT: at 10:02

## 2017-06-26 RX ADMIN — MAGNESIUM OXIDE TAB 400 MG (241.3 MG ELEMENTAL MG) SCH TAB: 400 (241.3 MG) TAB at 17:59

## 2017-06-26 RX ADMIN — Medication SCH MG: at 10:02

## 2017-06-26 RX ADMIN — Medication SCH EACH: at 17:59

## 2017-06-26 RX ADMIN — LIDOCAINE HYDROCHLORIDE PRN APPLIC: 20 JELLY TOPICAL at 10:04

## 2017-06-26 RX ADMIN — Medication SCH MG: at 10:03

## 2017-06-26 RX ADMIN — Medication SCH MCG: at 07:03

## 2017-06-27 RX ADMIN — Medication SCH MG: at 09:44

## 2017-06-27 RX ADMIN — Medication SCH EACH: at 18:30

## 2017-06-27 RX ADMIN — Medication SCH MG: at 18:29

## 2017-06-27 RX ADMIN — Medication SCH MG: at 09:45

## 2017-06-27 RX ADMIN — MAGNESIUM OXIDE TAB 400 MG (241.3 MG ELEMENTAL MG) SCH TAB: 400 (241.3 MG) TAB at 18:30

## 2017-06-27 RX ADMIN — Medication SCH MCG: at 06:37

## 2017-06-27 RX ADMIN — Medication SCH MG: at 18:30

## 2017-06-27 RX ADMIN — Medication SCH UNIT: at 09:45

## 2017-06-28 RX ADMIN — Medication SCH MG: at 18:08

## 2017-06-28 RX ADMIN — Medication SCH MG: at 09:52

## 2017-06-28 RX ADMIN — Medication SCH MG: at 18:09

## 2017-06-28 RX ADMIN — Medication SCH MCG: at 06:18

## 2017-06-28 RX ADMIN — Medication SCH MG: at 09:53

## 2017-06-28 RX ADMIN — Medication SCH UNIT: at 09:53

## 2017-06-28 RX ADMIN — MAGNESIUM OXIDE TAB 400 MG (241.3 MG ELEMENTAL MG) SCH TAB: 400 (241.3 MG) TAB at 18:10

## 2017-06-28 RX ADMIN — Medication SCH EACH: at 18:09

## 2017-06-28 NOTE — PN
Progress Note for JENIFER SHEETS  Date:  06/27/2017  Room #:

 

SUBJECTIVE:  This is an 88-year-old seen today on swing bed.  The patient

continues to have difficulty with getting up out of a chair.  We had ordered PT,

but in the end the patient decided that she did not want to pursue it and

physical therapy did not feel that it would help much.  She does have arthritis

that is limiting her mobility.  She has leg and knee pain, but does not take any

pain pills for it.  Otherwise, she has been feeling well.

 

OBJECTIVE:  Vital Signs:  Her temperature is 97.3, pulse 78, blood pressure

136/78, respiratory rate 20, O2 of 96% on room air.

General:  She is in no acute distress.

Heart:  Regular rate and rhythm with murmur today.

Respiratory:  Lungs sounds are clear to auscultation bilaterally without

crackles or wheezes.

Extremities:  Warm and dry.  No edema.  The knees show some valgus deformity,

but no significant crepitus appreciated.

 

ASSESSMENT AND PLAN:

1. Impaired mobility due to osteoarthritis, severe in her knees.  At this

    point, we will just continue to monitor and help her with cares that is why

    she is on swing bed for help with ADLs.

2. Chronic vulvar and vaginal irritation stable.

3. Essential hypertension, controlled.

4. Paroxysmal atrial fibrillation.  She is on Pradaxa for stroke prevention.

5. History of mild anemia.  She has had no further rectal bleeding.

6. Hypothyroidism, on treatment.

The plan at this point, the patient will continue on swing bed cares.  Her last

CBC on 05/05/2017 was normal.  No lab work due until October unless she develops

other problems.

 

 

MKA:  06/27/2017 12:57:44  MODL:  06/27/2017 13:32:59

Job #:  [id number]

## 2017-06-29 RX ADMIN — Medication SCH MG: at 10:28

## 2017-06-29 RX ADMIN — LIDOCAINE HYDROCHLORIDE PRN APPLIC: 20 JELLY TOPICAL at 10:30

## 2017-06-29 RX ADMIN — Medication SCH MG: at 18:00

## 2017-06-29 RX ADMIN — MAGNESIUM OXIDE TAB 400 MG (241.3 MG ELEMENTAL MG) SCH TAB: 400 (241.3 MG) TAB at 18:00

## 2017-06-29 RX ADMIN — Medication SCH MG: at 10:27

## 2017-06-29 RX ADMIN — Medication SCH EACH: at 18:00

## 2017-06-29 RX ADMIN — Medication SCH MCG: at 06:00

## 2017-06-29 RX ADMIN — Medication SCH UNIT: at 10:28

## 2017-06-30 RX ADMIN — Medication SCH EACH: at 18:25

## 2017-06-30 RX ADMIN — MAGNESIUM OXIDE TAB 400 MG (241.3 MG ELEMENTAL MG) SCH TAB: 400 (241.3 MG) TAB at 18:25

## 2017-06-30 RX ADMIN — Medication SCH MCG: at 06:43

## 2017-06-30 RX ADMIN — Medication SCH MG: at 18:25

## 2017-06-30 RX ADMIN — Medication SCH MG: at 18:24

## 2017-06-30 RX ADMIN — Medication SCH MG: at 10:00

## 2017-06-30 RX ADMIN — Medication SCH MG: at 09:58

## 2017-06-30 RX ADMIN — Medication SCH UNIT: at 09:59

## 2017-07-01 RX ADMIN — MAGNESIUM OXIDE TAB 400 MG (241.3 MG ELEMENTAL MG) SCH TAB: 400 (241.3 MG) TAB at 18:47

## 2017-07-01 RX ADMIN — Medication SCH MG: at 10:38

## 2017-07-01 RX ADMIN — Medication SCH UNIT: at 10:38

## 2017-07-01 RX ADMIN — Medication SCH EACH: at 18:46

## 2017-07-01 RX ADMIN — Medication SCH MG: at 10:37

## 2017-07-01 RX ADMIN — Medication SCH MG: at 18:47

## 2017-07-01 RX ADMIN — LIDOCAINE HYDROCHLORIDE PRN APPLIC: 20 JELLY TOPICAL at 10:37

## 2017-07-02 RX ADMIN — Medication SCH MG: at 10:19

## 2017-07-02 RX ADMIN — MAGNESIUM OXIDE TAB 400 MG (241.3 MG ELEMENTAL MG) SCH TAB: 400 (241.3 MG) TAB at 18:58

## 2017-07-02 RX ADMIN — Medication SCH MCG: at 06:01

## 2017-07-02 RX ADMIN — LIDOCAINE HYDROCHLORIDE PRN APPLIC: 20 JELLY TOPICAL at 10:22

## 2017-07-02 RX ADMIN — Medication SCH MG: at 18:58

## 2017-07-02 RX ADMIN — Medication SCH EACH: at 18:58

## 2017-07-02 RX ADMIN — Medication SCH UNIT: at 10:19

## 2017-07-03 RX ADMIN — Medication SCH EACH: at 18:23

## 2017-07-03 RX ADMIN — Medication SCH MG: at 09:43

## 2017-07-03 RX ADMIN — Medication SCH MG: at 18:23

## 2017-07-03 RX ADMIN — Medication SCH MCG: at 06:01

## 2017-07-03 RX ADMIN — Medication SCH UNIT: at 09:43

## 2017-07-03 RX ADMIN — MAGNESIUM OXIDE TAB 400 MG (241.3 MG ELEMENTAL MG) SCH TAB: 400 (241.3 MG) TAB at 18:23

## 2017-07-04 RX ADMIN — Medication SCH EACH: at 18:01

## 2017-07-04 RX ADMIN — LIDOCAINE HYDROCHLORIDE PRN APPLIC: 20 JELLY TOPICAL at 10:01

## 2017-07-04 RX ADMIN — MAGNESIUM OXIDE TAB 400 MG (241.3 MG ELEMENTAL MG) SCH TAB: 400 (241.3 MG) TAB at 18:02

## 2017-07-04 RX ADMIN — Medication SCH MG: at 18:01

## 2017-07-04 RX ADMIN — Medication SCH MG: at 18:02

## 2017-07-04 RX ADMIN — Medication SCH MG: at 10:02

## 2017-07-04 RX ADMIN — Medication SCH MG: at 10:03

## 2017-07-04 RX ADMIN — Medication SCH MCG: at 06:12

## 2017-07-04 RX ADMIN — Medication SCH UNIT: at 10:03

## 2017-07-05 RX ADMIN — LIDOCAINE HYDROCHLORIDE PRN APPLIC: 20 JELLY TOPICAL at 09:36

## 2017-07-05 RX ADMIN — Medication SCH MCG: at 06:06

## 2017-07-05 RX ADMIN — Medication SCH UNIT: at 09:33

## 2017-07-05 RX ADMIN — MAGNESIUM OXIDE TAB 400 MG (241.3 MG ELEMENTAL MG) SCH TAB: 400 (241.3 MG) TAB at 18:00

## 2017-07-05 RX ADMIN — Medication SCH MG: at 18:00

## 2017-07-05 RX ADMIN — Medication SCH MG: at 09:32

## 2017-07-05 RX ADMIN — DEXTRAN 70, HYPROMELLOSE 2910 PRN EACH: 3; 1 SOLUTION/ DROPS OPHTHALMIC at 09:36

## 2017-07-05 RX ADMIN — Medication SCH EACH: at 18:00

## 2017-07-06 RX ADMIN — Medication SCH MG: at 10:33

## 2017-07-06 RX ADMIN — Medication SCH EACH: at 18:13

## 2017-07-06 RX ADMIN — Medication SCH MCG: at 06:00

## 2017-07-06 RX ADMIN — Medication SCH MG: at 10:34

## 2017-07-06 RX ADMIN — Medication SCH UNIT: at 10:34

## 2017-07-06 RX ADMIN — Medication SCH MG: at 18:13

## 2017-07-06 RX ADMIN — LIDOCAINE HYDROCHLORIDE PRN APPLIC: 20 JELLY TOPICAL at 10:35

## 2017-07-06 RX ADMIN — MAGNESIUM OXIDE TAB 400 MG (241.3 MG ELEMENTAL MG) SCH TAB: 400 (241.3 MG) TAB at 18:13

## 2017-07-07 RX ADMIN — Medication SCH MG: at 09:56

## 2017-07-07 RX ADMIN — Medication SCH MCG: at 06:17

## 2017-07-07 RX ADMIN — MAGNESIUM OXIDE TAB 400 MG (241.3 MG ELEMENTAL MG) SCH TAB: 400 (241.3 MG) TAB at 18:27

## 2017-07-07 RX ADMIN — Medication SCH EACH: at 18:27

## 2017-07-07 RX ADMIN — Medication SCH UNIT: at 09:56

## 2017-07-07 RX ADMIN — Medication SCH MG: at 18:27

## 2017-07-08 RX ADMIN — Medication SCH MG: at 10:27

## 2017-07-08 RX ADMIN — LIDOCAINE HYDROCHLORIDE PRN APPLIC: 20 JELLY TOPICAL at 10:29

## 2017-07-08 RX ADMIN — DEXTRAN 70, HYPROMELLOSE 2910 PRN EACH: 3; 1 SOLUTION/ DROPS OPHTHALMIC at 10:29

## 2017-07-08 RX ADMIN — Medication SCH EACH: at 20:56

## 2017-07-08 RX ADMIN — MAGNESIUM OXIDE TAB 400 MG (241.3 MG ELEMENTAL MG) SCH TAB: 400 (241.3 MG) TAB at 20:56

## 2017-07-08 RX ADMIN — Medication SCH UNIT: at 10:28

## 2017-07-08 RX ADMIN — Medication SCH MG: at 20:56

## 2017-07-08 RX ADMIN — Medication SCH MG: at 20:57

## 2017-07-08 RX ADMIN — Medication SCH MG: at 10:28

## 2017-07-09 RX ADMIN — LIDOCAINE HYDROCHLORIDE PRN APPLIC: 20 JELLY TOPICAL at 18:35

## 2017-07-09 RX ADMIN — Medication SCH MG: at 10:37

## 2017-07-09 RX ADMIN — MAGNESIUM OXIDE TAB 400 MG (241.3 MG ELEMENTAL MG) SCH TAB: 400 (241.3 MG) TAB at 18:33

## 2017-07-09 RX ADMIN — Medication SCH MG: at 10:38

## 2017-07-09 RX ADMIN — Medication SCH UNIT: at 10:37

## 2017-07-09 RX ADMIN — Medication SCH MG: at 18:33

## 2017-07-09 RX ADMIN — Medication SCH MCG: at 06:11

## 2017-07-09 RX ADMIN — Medication SCH EACH: at 18:33

## 2017-07-09 RX ADMIN — Medication SCH MG: at 18:35

## 2017-07-10 RX ADMIN — Medication SCH MG: at 09:32

## 2017-07-10 RX ADMIN — Medication SCH UNIT: at 09:33

## 2017-07-10 RX ADMIN — LIDOCAINE HYDROCHLORIDE PRN APPLIC: 20 JELLY TOPICAL at 09:35

## 2017-07-10 RX ADMIN — Medication SCH EACH: at 18:04

## 2017-07-10 RX ADMIN — Medication SCH MCG: at 06:13

## 2017-07-10 RX ADMIN — Medication SCH MG: at 18:04

## 2017-07-10 RX ADMIN — MAGNESIUM OXIDE TAB 400 MG (241.3 MG ELEMENTAL MG) SCH TAB: 400 (241.3 MG) TAB at 18:04

## 2017-07-10 RX ADMIN — Medication SCH MG: at 18:05

## 2017-07-10 RX ADMIN — Medication SCH MG: at 09:33

## 2017-07-11 RX ADMIN — Medication SCH MG: at 09:40

## 2017-07-11 RX ADMIN — Medication SCH UNIT: at 09:40

## 2017-07-11 RX ADMIN — Medication SCH MCG: at 06:41

## 2017-07-11 RX ADMIN — MAGNESIUM OXIDE TAB 400 MG (241.3 MG ELEMENTAL MG) SCH TAB: 400 (241.3 MG) TAB at 18:09

## 2017-07-11 RX ADMIN — Medication SCH EACH: at 18:10

## 2017-07-11 RX ADMIN — Medication SCH MG: at 18:09

## 2017-07-11 RX ADMIN — Medication SCH MG: at 09:39

## 2017-07-12 RX ADMIN — Medication SCH MG: at 09:29

## 2017-07-12 RX ADMIN — Medication SCH UNIT: at 09:28

## 2017-07-12 RX ADMIN — Medication SCH MG: at 19:30

## 2017-07-12 RX ADMIN — MAGNESIUM OXIDE TAB 400 MG (241.3 MG ELEMENTAL MG) SCH TAB: 400 (241.3 MG) TAB at 19:31

## 2017-07-12 RX ADMIN — Medication SCH MCG: at 06:23

## 2017-07-12 RX ADMIN — Medication SCH MG: at 09:28

## 2017-07-12 RX ADMIN — Medication SCH MG: at 19:31

## 2017-07-12 RX ADMIN — Medication SCH EACH: at 19:31

## 2017-07-13 RX ADMIN — Medication SCH EACH: at 18:16

## 2017-07-13 RX ADMIN — Medication SCH MG: at 10:17

## 2017-07-13 RX ADMIN — Medication SCH MG: at 18:16

## 2017-07-13 RX ADMIN — LIDOCAINE HYDROCHLORIDE PRN APPLIC: 20 JELLY TOPICAL at 10:18

## 2017-07-13 RX ADMIN — MAGNESIUM OXIDE TAB 400 MG (241.3 MG ELEMENTAL MG) SCH TAB: 400 (241.3 MG) TAB at 18:17

## 2017-07-13 RX ADMIN — Medication SCH MG: at 10:16

## 2017-07-13 RX ADMIN — Medication SCH UNIT: at 10:17

## 2017-07-13 RX ADMIN — Medication SCH MCG: at 06:33

## 2017-07-13 RX ADMIN — Medication SCH MG: at 18:15

## 2017-07-14 RX ADMIN — Medication SCH MG: at 10:28

## 2017-07-14 RX ADMIN — MAGNESIUM OXIDE TAB 400 MG (241.3 MG ELEMENTAL MG) SCH TAB: 400 (241.3 MG) TAB at 18:05

## 2017-07-14 RX ADMIN — Medication SCH EACH: at 18:05

## 2017-07-14 RX ADMIN — DEXTRAN 70, HYPROMELLOSE 2910 PRN EACH: 3; 1 SOLUTION/ DROPS OPHTHALMIC at 10:34

## 2017-07-14 RX ADMIN — Medication SCH MG: at 18:05

## 2017-07-14 RX ADMIN — Medication SCH MCG: at 06:12

## 2017-07-14 RX ADMIN — Medication SCH UNIT: at 10:27

## 2017-07-14 RX ADMIN — LIDOCAINE HYDROCHLORIDE PRN APPLIC: 20 JELLY TOPICAL at 10:32

## 2017-07-15 RX ADMIN — Medication SCH MG: at 10:00

## 2017-07-15 RX ADMIN — Medication SCH EACH: at 18:39

## 2017-07-15 RX ADMIN — Medication SCH MG: at 18:39

## 2017-07-15 RX ADMIN — Medication SCH UNIT: at 09:59

## 2017-07-15 RX ADMIN — DEXTRAN 70, HYPROMELLOSE 2910 PRN EACH: 3; 1 SOLUTION/ DROPS OPHTHALMIC at 10:03

## 2017-07-15 RX ADMIN — Medication SCH MG: at 09:59

## 2017-07-15 RX ADMIN — MAGNESIUM OXIDE TAB 400 MG (241.3 MG ELEMENTAL MG) SCH TAB: 400 (241.3 MG) TAB at 18:38

## 2017-07-16 RX ADMIN — Medication SCH EACH: at 18:06

## 2017-07-16 RX ADMIN — Medication SCH MG: at 10:16

## 2017-07-16 RX ADMIN — MAGNESIUM OXIDE TAB 400 MG (241.3 MG ELEMENTAL MG) SCH TAB: 400 (241.3 MG) TAB at 18:06

## 2017-07-16 RX ADMIN — Medication SCH UNIT: at 10:17

## 2017-07-16 RX ADMIN — LIDOCAINE HYDROCHLORIDE PRN APPLIC: 20 JELLY TOPICAL at 10:18

## 2017-07-16 RX ADMIN — Medication SCH MG: at 10:17

## 2017-07-16 RX ADMIN — Medication SCH MCG: at 06:04

## 2017-07-16 RX ADMIN — Medication SCH MG: at 18:06

## 2017-07-16 RX ADMIN — DEXTRAN 70, HYPROMELLOSE 2910 PRN EACH: 3; 1 SOLUTION/ DROPS OPHTHALMIC at 10:18

## 2017-07-17 RX ADMIN — Medication SCH MCG: at 06:21

## 2017-07-17 RX ADMIN — Medication SCH EACH: at 18:36

## 2017-07-17 RX ADMIN — Medication SCH UNIT: at 09:40

## 2017-07-17 RX ADMIN — Medication SCH MG: at 09:39

## 2017-07-17 RX ADMIN — Medication SCH MG: at 18:36

## 2017-07-17 RX ADMIN — Medication SCH MG: at 18:37

## 2017-07-17 RX ADMIN — MAGNESIUM OXIDE TAB 400 MG (241.3 MG ELEMENTAL MG) SCH TAB: 400 (241.3 MG) TAB at 18:36

## 2017-07-17 RX ADMIN — Medication SCH MG: at 09:40

## 2017-07-18 RX ADMIN — Medication SCH MG: at 18:27

## 2017-07-18 RX ADMIN — Medication SCH MCG: at 06:06

## 2017-07-18 RX ADMIN — Medication SCH EACH: at 18:27

## 2017-07-18 RX ADMIN — MAGNESIUM OXIDE TAB 400 MG (241.3 MG ELEMENTAL MG) SCH TAB: 400 (241.3 MG) TAB at 18:27

## 2017-07-18 RX ADMIN — LIDOCAINE HYDROCHLORIDE PRN APPLIC: 20 JELLY TOPICAL at 09:33

## 2017-07-18 RX ADMIN — Medication SCH MG: at 09:29

## 2017-07-18 RX ADMIN — Medication SCH UNIT: at 09:30

## 2017-07-18 RX ADMIN — Medication SCH MG: at 18:28

## 2017-07-18 RX ADMIN — Medication SCH MG: at 09:30

## 2017-07-19 RX ADMIN — MAGNESIUM OXIDE TAB 400 MG (241.3 MG ELEMENTAL MG) SCH TAB: 400 (241.3 MG) TAB at 19:01

## 2017-07-19 RX ADMIN — Medication SCH MCG: at 06:19

## 2017-07-19 RX ADMIN — Medication SCH MG: at 19:01

## 2017-07-19 RX ADMIN — Medication SCH UNIT: at 09:22

## 2017-07-19 RX ADMIN — Medication SCH MG: at 09:23

## 2017-07-19 RX ADMIN — Medication SCH MG: at 09:22

## 2017-07-19 RX ADMIN — Medication SCH EACH: at 19:01

## 2017-07-20 RX ADMIN — Medication SCH UNIT: at 10:01

## 2017-07-20 RX ADMIN — Medication SCH MG: at 10:01

## 2017-07-20 RX ADMIN — Medication SCH MG: at 17:59

## 2017-07-20 RX ADMIN — LIDOCAINE HYDROCHLORIDE PRN APPLIC: 20 JELLY TOPICAL at 10:04

## 2017-07-20 RX ADMIN — Medication SCH MCG: at 06:35

## 2017-07-20 RX ADMIN — MAGNESIUM OXIDE TAB 400 MG (241.3 MG ELEMENTAL MG) SCH TAB: 400 (241.3 MG) TAB at 17:59

## 2017-07-20 RX ADMIN — Medication SCH EACH: at 17:59

## 2017-07-21 RX ADMIN — LIDOCAINE HYDROCHLORIDE PRN APPLIC: 20 JELLY TOPICAL at 09:32

## 2017-07-21 RX ADMIN — Medication SCH MG: at 09:29

## 2017-07-21 RX ADMIN — Medication SCH MG: at 09:27

## 2017-07-21 RX ADMIN — MAGNESIUM OXIDE TAB 400 MG (241.3 MG ELEMENTAL MG) SCH TAB: 400 (241.3 MG) TAB at 18:34

## 2017-07-21 RX ADMIN — Medication SCH EACH: at 18:33

## 2017-07-21 RX ADMIN — Medication SCH UNIT: at 09:30

## 2017-07-21 RX ADMIN — Medication SCH MG: at 18:33

## 2017-07-21 RX ADMIN — Medication SCH MCG: at 06:34

## 2017-07-22 RX ADMIN — Medication SCH MG: at 09:49

## 2017-07-22 RX ADMIN — MAGNESIUM OXIDE TAB 400 MG (241.3 MG ELEMENTAL MG) SCH TAB: 400 (241.3 MG) TAB at 18:03

## 2017-07-22 RX ADMIN — Medication SCH MG: at 18:03

## 2017-07-22 RX ADMIN — Medication SCH EACH: at 18:03

## 2017-07-22 RX ADMIN — Medication SCH MG: at 18:02

## 2017-07-22 RX ADMIN — LIDOCAINE HYDROCHLORIDE PRN APPLIC: 20 JELLY TOPICAL at 09:50

## 2017-07-22 RX ADMIN — Medication SCH UNIT: at 09:49

## 2017-07-23 RX ADMIN — Medication SCH UNIT: at 10:28

## 2017-07-23 RX ADMIN — Medication SCH MCG: at 06:02

## 2017-07-23 RX ADMIN — Medication SCH MG: at 10:28

## 2017-07-23 RX ADMIN — Medication SCH MG: at 18:05

## 2017-07-23 RX ADMIN — Medication SCH EACH: at 18:05

## 2017-07-23 RX ADMIN — MAGNESIUM OXIDE TAB 400 MG (241.3 MG ELEMENTAL MG) SCH TAB: 400 (241.3 MG) TAB at 18:06

## 2017-07-24 RX ADMIN — Medication SCH MG: at 18:05

## 2017-07-24 RX ADMIN — Medication SCH MG: at 18:04

## 2017-07-24 RX ADMIN — Medication SCH EACH: at 18:05

## 2017-07-24 RX ADMIN — LIDOCAINE HYDROCHLORIDE PRN APPLIC: 20 JELLY TOPICAL at 09:50

## 2017-07-24 RX ADMIN — Medication SCH MCG: at 06:01

## 2017-07-24 RX ADMIN — Medication SCH MG: at 09:49

## 2017-07-24 RX ADMIN — MAGNESIUM OXIDE TAB 400 MG (241.3 MG ELEMENTAL MG) SCH TAB: 400 (241.3 MG) TAB at 18:06

## 2017-07-24 RX ADMIN — Medication SCH UNIT: at 09:48

## 2017-07-24 RX ADMIN — Medication SCH MG: at 09:48

## 2017-07-25 RX ADMIN — Medication SCH MG: at 18:08

## 2017-07-25 RX ADMIN — LIDOCAINE HYDROCHLORIDE PRN APPLIC: 20 JELLY TOPICAL at 09:44

## 2017-07-25 RX ADMIN — MAGNESIUM OXIDE TAB 400 MG (241.3 MG ELEMENTAL MG) SCH TAB: 400 (241.3 MG) TAB at 18:08

## 2017-07-25 RX ADMIN — Medication SCH MG: at 09:42

## 2017-07-25 RX ADMIN — Medication SCH EACH: at 18:08

## 2017-07-25 RX ADMIN — Medication SCH MCG: at 06:07

## 2017-07-25 RX ADMIN — Medication SCH UNIT: at 09:42

## 2017-07-25 RX ADMIN — Medication SCH MG: at 18:09

## 2017-07-26 RX ADMIN — Medication SCH EACH: at 18:08

## 2017-07-26 RX ADMIN — Medication SCH MG: at 18:08

## 2017-07-26 RX ADMIN — MAGNESIUM OXIDE TAB 400 MG (241.3 MG ELEMENTAL MG) SCH TAB: 400 (241.3 MG) TAB at 18:08

## 2017-07-26 RX ADMIN — Medication SCH MCG: at 06:04

## 2017-07-26 RX ADMIN — Medication SCH MG: at 09:31

## 2017-07-26 RX ADMIN — Medication SCH MG: at 09:30

## 2017-07-26 RX ADMIN — Medication SCH UNIT: at 09:31

## 2017-07-27 RX ADMIN — Medication SCH MCG: at 06:22

## 2017-07-27 RX ADMIN — Medication SCH UNIT: at 09:46

## 2017-07-27 RX ADMIN — Medication SCH MG: at 18:29

## 2017-07-27 RX ADMIN — Medication SCH MG: at 09:46

## 2017-07-27 RX ADMIN — Medication SCH EACH: at 18:29

## 2017-07-27 RX ADMIN — Medication SCH MG: at 18:30

## 2017-07-27 RX ADMIN — MAGNESIUM OXIDE TAB 400 MG (241.3 MG ELEMENTAL MG) SCH TAB: 400 (241.3 MG) TAB at 18:30

## 2017-07-28 RX ADMIN — MAGNESIUM OXIDE TAB 400 MG (241.3 MG ELEMENTAL MG) SCH TAB: 400 (241.3 MG) TAB at 18:39

## 2017-07-28 RX ADMIN — Medication SCH UNIT: at 09:43

## 2017-07-28 RX ADMIN — Medication SCH MG: at 18:39

## 2017-07-28 RX ADMIN — Medication SCH EACH: at 18:39

## 2017-07-28 RX ADMIN — Medication SCH MG: at 18:40

## 2017-07-28 RX ADMIN — Medication SCH MCG: at 06:18

## 2017-07-28 RX ADMIN — Medication SCH MG: at 09:41

## 2017-07-28 RX ADMIN — Medication SCH MG: at 09:43

## 2017-07-29 RX ADMIN — Medication SCH MG: at 18:23

## 2017-07-29 RX ADMIN — Medication SCH UNIT: at 09:28

## 2017-07-29 RX ADMIN — Medication SCH EACH: at 18:23

## 2017-07-29 RX ADMIN — MAGNESIUM OXIDE TAB 400 MG (241.3 MG ELEMENTAL MG) SCH TAB: 400 (241.3 MG) TAB at 18:23

## 2017-07-29 RX ADMIN — Medication SCH MG: at 18:24

## 2017-07-29 RX ADMIN — LIDOCAINE HYDROCHLORIDE PRN APPLIC: 20 JELLY TOPICAL at 09:29

## 2017-07-29 RX ADMIN — Medication SCH MG: at 09:28

## 2017-07-30 RX ADMIN — Medication SCH UNIT: at 09:30

## 2017-07-30 RX ADMIN — Medication SCH MCG: at 06:05

## 2017-07-30 RX ADMIN — MAGNESIUM OXIDE TAB 400 MG (241.3 MG ELEMENTAL MG) SCH TAB: 400 (241.3 MG) TAB at 18:02

## 2017-07-30 RX ADMIN — Medication SCH MG: at 09:31

## 2017-07-30 RX ADMIN — Medication SCH EACH: at 18:02

## 2017-07-30 RX ADMIN — Medication SCH MG: at 09:30

## 2017-07-30 RX ADMIN — Medication SCH MG: at 18:02

## 2017-07-30 RX ADMIN — Medication SCH MG: at 18:03

## 2017-07-31 RX ADMIN — Medication SCH MG: at 18:25

## 2017-07-31 RX ADMIN — Medication SCH MG: at 09:44

## 2017-07-31 RX ADMIN — Medication SCH MCG: at 06:03

## 2017-07-31 RX ADMIN — Medication SCH MG: at 18:24

## 2017-07-31 RX ADMIN — MAGNESIUM OXIDE TAB 400 MG (241.3 MG ELEMENTAL MG) SCH TAB: 400 (241.3 MG) TAB at 18:25

## 2017-07-31 RX ADMIN — Medication SCH MG: at 09:45

## 2017-07-31 RX ADMIN — Medication SCH UNIT: at 09:44

## 2017-07-31 RX ADMIN — Medication SCH EACH: at 18:25

## 2017-08-01 RX ADMIN — Medication SCH EACH: at 19:48

## 2017-08-01 RX ADMIN — Medication SCH MG: at 19:49

## 2017-08-01 RX ADMIN — Medication SCH MG: at 10:15

## 2017-08-01 RX ADMIN — Medication SCH MG: at 19:48

## 2017-08-01 RX ADMIN — Medication SCH MG: at 19:51

## 2017-08-01 RX ADMIN — Medication SCH MCG: at 06:12

## 2017-08-01 RX ADMIN — MAGNESIUM OXIDE TAB 400 MG (241.3 MG ELEMENTAL MG) SCH TAB: 400 (241.3 MG) TAB at 19:50

## 2017-08-01 RX ADMIN — Medication SCH UNIT: at 10:16

## 2017-08-01 RX ADMIN — DEXTRAN 70, HYPROMELLOSE 2910 PRN EACH: 3; 1 SOLUTION/ DROPS OPHTHALMIC at 10:17

## 2017-08-02 RX ADMIN — Medication SCH EACH: at 18:13

## 2017-08-02 RX ADMIN — Medication SCH MG: at 10:10

## 2017-08-02 RX ADMIN — Medication SCH UNIT: at 10:10

## 2017-08-02 RX ADMIN — Medication SCH MG: at 18:14

## 2017-08-02 RX ADMIN — Medication SCH MG: at 18:12

## 2017-08-02 RX ADMIN — Medication SCH MCG: at 06:38

## 2017-08-02 RX ADMIN — LIDOCAINE HYDROCHLORIDE PRN APPLIC: 20 JELLY TOPICAL at 10:11

## 2017-08-02 RX ADMIN — Medication SCH MG: at 18:13

## 2017-08-02 RX ADMIN — MAGNESIUM OXIDE TAB 400 MG (241.3 MG ELEMENTAL MG) SCH TAB: 400 (241.3 MG) TAB at 18:13

## 2017-08-02 RX ADMIN — Medication SCH MG: at 10:09

## 2017-08-03 RX ADMIN — Medication SCH MG: at 10:18

## 2017-08-03 RX ADMIN — Medication SCH MCG: at 06:11

## 2017-08-03 RX ADMIN — Medication SCH MG: at 18:22

## 2017-08-03 RX ADMIN — Medication SCH MG: at 10:19

## 2017-08-03 RX ADMIN — Medication SCH UNIT: at 10:19

## 2017-08-03 RX ADMIN — MAGNESIUM OXIDE TAB 400 MG (241.3 MG ELEMENTAL MG) SCH TAB: 400 (241.3 MG) TAB at 18:22

## 2017-08-03 RX ADMIN — Medication SCH EACH: at 18:22

## 2017-08-04 RX ADMIN — LIDOCAINE HYDROCHLORIDE PRN APPLIC: 20 JELLY TOPICAL at 10:08

## 2017-08-04 RX ADMIN — MAGNESIUM OXIDE TAB 400 MG (241.3 MG ELEMENTAL MG) SCH TAB: 400 (241.3 MG) TAB at 19:39

## 2017-08-04 RX ADMIN — Medication SCH MG: at 10:06

## 2017-08-04 RX ADMIN — Medication SCH EACH: at 19:39

## 2017-08-04 RX ADMIN — Medication SCH UNIT: at 10:07

## 2017-08-04 RX ADMIN — Medication SCH MG: at 19:40

## 2017-08-04 RX ADMIN — Medication SCH MG: at 19:39

## 2017-08-04 RX ADMIN — Medication SCH MCG: at 06:21

## 2017-08-05 RX ADMIN — DEXTRAN 70, HYPROMELLOSE 2910 PRN EACH: 3; 1 SOLUTION/ DROPS OPHTHALMIC at 10:18

## 2017-08-05 RX ADMIN — Medication SCH UNIT: at 10:11

## 2017-08-05 RX ADMIN — MAGNESIUM OXIDE TAB 400 MG (241.3 MG ELEMENTAL MG) SCH TAB: 400 (241.3 MG) TAB at 18:36

## 2017-08-05 RX ADMIN — Medication SCH MG: at 18:36

## 2017-08-05 RX ADMIN — Medication SCH MG: at 10:11

## 2017-08-05 RX ADMIN — LIDOCAINE HYDROCHLORIDE PRN APPLIC: 20 JELLY TOPICAL at 10:13

## 2017-08-05 RX ADMIN — Medication SCH EACH: at 18:36

## 2017-08-06 RX ADMIN — MAGNESIUM OXIDE TAB 400 MG (241.3 MG ELEMENTAL MG) SCH TAB: 400 (241.3 MG) TAB at 18:01

## 2017-08-06 RX ADMIN — LIDOCAINE HYDROCHLORIDE PRN APPLIC: 20 JELLY TOPICAL at 09:59

## 2017-08-06 RX ADMIN — Medication SCH EACH: at 18:01

## 2017-08-06 RX ADMIN — Medication SCH MG: at 09:57

## 2017-08-06 RX ADMIN — Medication SCH UNIT: at 09:57

## 2017-08-06 RX ADMIN — Medication SCH MCG: at 06:06

## 2017-08-06 RX ADMIN — Medication SCH MG: at 18:01

## 2017-08-06 RX ADMIN — Medication SCH MG: at 18:00

## 2017-08-07 RX ADMIN — Medication SCH UNIT: at 09:54

## 2017-08-07 RX ADMIN — Medication SCH MG: at 09:50

## 2017-08-07 RX ADMIN — Medication SCH MG: at 18:38

## 2017-08-07 RX ADMIN — LIDOCAINE HYDROCHLORIDE PRN APPLIC: 20 JELLY TOPICAL at 09:59

## 2017-08-07 RX ADMIN — MAGNESIUM OXIDE TAB 400 MG (241.3 MG ELEMENTAL MG) SCH TAB: 400 (241.3 MG) TAB at 18:39

## 2017-08-07 RX ADMIN — Medication SCH MG: at 18:39

## 2017-08-07 RX ADMIN — Medication SCH MG: at 18:40

## 2017-08-07 RX ADMIN — LIDOCAINE HYDROCHLORIDE PRN APPLIC: 20 JELLY TOPICAL at 17:15

## 2017-08-07 RX ADMIN — Medication SCH MCG: at 06:01

## 2017-08-07 RX ADMIN — Medication SCH MG: at 09:55

## 2017-08-07 RX ADMIN — Medication SCH EACH: at 18:38

## 2017-08-08 RX ADMIN — Medication SCH UNIT: at 09:50

## 2017-08-08 RX ADMIN — Medication SCH EACH: at 18:58

## 2017-08-08 RX ADMIN — LIDOCAINE HYDROCHLORIDE PRN APPLIC: 20 JELLY TOPICAL at 09:54

## 2017-08-08 RX ADMIN — Medication SCH MG: at 18:57

## 2017-08-08 RX ADMIN — Medication SCH MG: at 09:53

## 2017-08-08 RX ADMIN — Medication SCH MG: at 09:49

## 2017-08-08 RX ADMIN — Medication SCH MG: at 18:58

## 2017-08-08 RX ADMIN — Medication SCH MCG: at 06:21

## 2017-08-08 RX ADMIN — MAGNESIUM OXIDE TAB 400 MG (241.3 MG ELEMENTAL MG) SCH TAB: 400 (241.3 MG) TAB at 18:59

## 2017-08-08 NOTE — PN
Progress Note for JENIFER SHEETS  Date:  08/08/2017  Room #:

 

SUBJECTIVE:  This is an 88-year-old on long-term swing bed.  The patient feels

she is doing fine.  Nursing had noted she had been more weepy.  The patient

states she was just frustrated for another resident up here who was needing to

get home as he was a younger patient with some medical problems, otherwise she

feels fine.  She declines to take anything for depression.  She still requires

assistance with ADLs due to her arthritis, but she has declined to go through

further physical therapy treatments.  She has no pain or trouble breathing.

 

OBJECTIVE:  Vital Signs:  Her temperature is 97.5, pulse is 70, weight is 81.5

kg, blood pressure 128/58, respiratory rate 16, and O2 of 92% on room air.

General:  She is in no acute distress.

Heart:  Regular rate and rhythm with murmur.

Lungs:  Sounds are clear to auscultation bilaterally without crackles or

wheezes.

Extremities:  Warm and dry.  No edema.

Mental Status:  She is alert and orientated x3..

 

ASSESSMENT:

1. Impaired mobility due to osteoarthritis, severe in her knees.  She will

    continue swing bed cares to require assistance with ADLs.

2. Essential hypertension, controlled.

3. Paroxysmal atrial fibrillation, on Pradaxa for stroke prevention.

4. Mild chronic anemia.  She has had no further rectal bleeding.

5. Hypothyroidism, on treatment.

6. Chronic vulvar and vaginal irritation, stable.

 

PLAN:  At this point, she will continue swing bed cares.  No lab work due until

October unless she develops further problems.

 

 

MKA:  08/08/2017 10:07:25  MODL:  08/08/2017 10:43:01

Job #:  [id number]

## 2017-08-09 RX ADMIN — Medication SCH MG: at 10:20

## 2017-08-09 RX ADMIN — Medication SCH MG: at 18:01

## 2017-08-09 RX ADMIN — Medication SCH MCG: at 06:10

## 2017-08-09 RX ADMIN — Medication SCH EACH: at 18:00

## 2017-08-09 RX ADMIN — MAGNESIUM OXIDE TAB 400 MG (241.3 MG ELEMENTAL MG) SCH TAB: 400 (241.3 MG) TAB at 18:00

## 2017-08-09 RX ADMIN — Medication SCH MG: at 18:00

## 2017-08-09 RX ADMIN — Medication SCH UNIT: at 10:20

## 2017-08-10 RX ADMIN — Medication SCH MG: at 19:40

## 2017-08-10 RX ADMIN — Medication SCH MG: at 09:23

## 2017-08-10 RX ADMIN — Medication SCH MCG: at 06:04

## 2017-08-10 RX ADMIN — Medication SCH EACH: at 19:40

## 2017-08-10 RX ADMIN — MAGNESIUM OXIDE TAB 400 MG (241.3 MG ELEMENTAL MG) SCH TAB: 400 (241.3 MG) TAB at 19:40

## 2017-08-10 RX ADMIN — Medication SCH UNIT: at 09:22

## 2017-08-10 RX ADMIN — Medication SCH MG: at 19:41

## 2017-08-10 RX ADMIN — Medication SCH MG: at 09:22

## 2017-08-11 RX ADMIN — Medication SCH MG: at 18:07

## 2017-08-11 RX ADMIN — MAGNESIUM OXIDE TAB 400 MG (241.3 MG ELEMENTAL MG) SCH TAB: 400 (241.3 MG) TAB at 18:08

## 2017-08-11 RX ADMIN — Medication SCH EACH: at 18:08

## 2017-08-11 RX ADMIN — Medication SCH UNIT: at 10:30

## 2017-08-11 RX ADMIN — Medication SCH MCG: at 06:06

## 2017-08-11 RX ADMIN — Medication SCH MG: at 10:30

## 2017-08-12 RX ADMIN — Medication SCH EACH: at 18:07

## 2017-08-12 RX ADMIN — Medication SCH UNIT: at 10:16

## 2017-08-12 RX ADMIN — Medication SCH MG: at 18:06

## 2017-08-12 RX ADMIN — Medication SCH MG: at 10:15

## 2017-08-12 RX ADMIN — MAGNESIUM OXIDE TAB 400 MG (241.3 MG ELEMENTAL MG) SCH TAB: 400 (241.3 MG) TAB at 18:07

## 2017-08-13 RX ADMIN — Medication SCH UNIT: at 09:54

## 2017-08-13 RX ADMIN — Medication SCH MCG: at 06:08

## 2017-08-13 RX ADMIN — Medication SCH MG: at 09:53

## 2017-08-13 RX ADMIN — Medication SCH EACH: at 18:59

## 2017-08-13 RX ADMIN — MAGNESIUM OXIDE TAB 400 MG (241.3 MG ELEMENTAL MG) SCH TAB: 400 (241.3 MG) TAB at 19:00

## 2017-08-13 RX ADMIN — Medication SCH MG: at 18:59

## 2017-08-14 RX ADMIN — Medication SCH MG: at 18:09

## 2017-08-14 RX ADMIN — Medication SCH MCG: at 06:33

## 2017-08-14 RX ADMIN — MAGNESIUM OXIDE TAB 400 MG (241.3 MG ELEMENTAL MG) SCH TAB: 400 (241.3 MG) TAB at 18:09

## 2017-08-14 RX ADMIN — Medication SCH UNIT: at 09:59

## 2017-08-14 RX ADMIN — MICONAZOLE NITRATE PRN APPLIC: 2 POWDER TOPICAL at 10:01

## 2017-08-14 RX ADMIN — Medication SCH MG: at 09:59

## 2017-08-14 RX ADMIN — Medication SCH EACH: at 18:09

## 2017-08-15 RX ADMIN — MAGNESIUM OXIDE TAB 400 MG (241.3 MG ELEMENTAL MG) SCH TAB: 400 (241.3 MG) TAB at 18:08

## 2017-08-15 RX ADMIN — Medication SCH MCG: at 06:03

## 2017-08-15 RX ADMIN — Medication SCH EACH: at 18:08

## 2017-08-15 RX ADMIN — Medication SCH MG: at 18:08

## 2017-08-15 RX ADMIN — DEXTRAN 70, HYPROMELLOSE 2910 PRN EACH: 3; 1 SOLUTION/ DROPS OPHTHALMIC at 11:15

## 2017-08-15 RX ADMIN — Medication SCH UNIT: at 09:43

## 2017-08-15 RX ADMIN — Medication SCH MG: at 09:43

## 2017-08-16 RX ADMIN — Medication SCH MG: at 18:46

## 2017-08-16 RX ADMIN — MAGNESIUM OXIDE TAB 400 MG (241.3 MG ELEMENTAL MG) SCH TAB: 400 (241.3 MG) TAB at 18:46

## 2017-08-16 RX ADMIN — Medication SCH MG: at 10:13

## 2017-08-16 RX ADMIN — Medication SCH MG: at 10:14

## 2017-08-16 RX ADMIN — Medication SCH EACH: at 18:46

## 2017-08-16 RX ADMIN — Medication SCH MCG: at 06:22

## 2017-08-16 RX ADMIN — Medication SCH UNIT: at 10:13

## 2017-08-17 RX ADMIN — Medication SCH MG: at 18:23

## 2017-08-17 RX ADMIN — Medication SCH EACH: at 18:22

## 2017-08-17 RX ADMIN — Medication SCH MG: at 10:05

## 2017-08-17 RX ADMIN — Medication SCH UNIT: at 10:05

## 2017-08-17 RX ADMIN — Medication SCH MG: at 18:22

## 2017-08-17 RX ADMIN — Medication SCH MG: at 10:06

## 2017-08-17 RX ADMIN — MAGNESIUM OXIDE TAB 400 MG (241.3 MG ELEMENTAL MG) SCH TAB: 400 (241.3 MG) TAB at 18:23

## 2017-08-17 RX ADMIN — Medication SCH MCG: at 06:16

## 2017-08-18 RX ADMIN — Medication SCH MG: at 18:37

## 2017-08-18 RX ADMIN — Medication SCH MG: at 10:03

## 2017-08-18 RX ADMIN — Medication SCH MG: at 10:04

## 2017-08-18 RX ADMIN — Medication SCH EACH: at 18:36

## 2017-08-18 RX ADMIN — MAGNESIUM OXIDE TAB 400 MG (241.3 MG ELEMENTAL MG) SCH TAB: 400 (241.3 MG) TAB at 18:36

## 2017-08-18 RX ADMIN — Medication SCH MG: at 18:36

## 2017-08-18 RX ADMIN — Medication SCH MCG: at 06:21

## 2017-08-18 RX ADMIN — Medication SCH UNIT: at 10:04

## 2017-08-19 RX ADMIN — MAGNESIUM OXIDE TAB 400 MG (241.3 MG ELEMENTAL MG) SCH TAB: 400 (241.3 MG) TAB at 18:45

## 2017-08-19 RX ADMIN — Medication SCH MG: at 18:46

## 2017-08-19 RX ADMIN — Medication SCH UNIT: at 10:02

## 2017-08-19 RX ADMIN — Medication SCH MG: at 18:44

## 2017-08-19 RX ADMIN — Medication SCH EACH: at 18:46

## 2017-08-19 RX ADMIN — Medication SCH MG: at 10:02

## 2017-08-20 RX ADMIN — Medication SCH EACH: at 18:13

## 2017-08-20 RX ADMIN — Medication SCH MCG: at 06:06

## 2017-08-20 RX ADMIN — Medication SCH UNIT: at 10:18

## 2017-08-20 RX ADMIN — Medication SCH MG: at 10:18

## 2017-08-20 RX ADMIN — Medication SCH MG: at 10:17

## 2017-08-20 RX ADMIN — MAGNESIUM OXIDE TAB 400 MG (241.3 MG ELEMENTAL MG) SCH TAB: 400 (241.3 MG) TAB at 18:13

## 2017-08-20 RX ADMIN — Medication SCH MG: at 18:12

## 2017-08-20 RX ADMIN — Medication SCH MG: at 18:13

## 2017-08-21 RX ADMIN — Medication SCH MG: at 10:22

## 2017-08-21 RX ADMIN — Medication SCH MG: at 10:23

## 2017-08-21 RX ADMIN — Medication SCH MCG: at 06:04

## 2017-08-21 RX ADMIN — Medication SCH MG: at 18:29

## 2017-08-21 RX ADMIN — Medication SCH EACH: at 18:28

## 2017-08-21 RX ADMIN — Medication SCH UNIT: at 10:23

## 2017-08-21 RX ADMIN — MAGNESIUM OXIDE TAB 400 MG (241.3 MG ELEMENTAL MG) SCH TAB: 400 (241.3 MG) TAB at 18:29

## 2017-08-22 RX ADMIN — Medication SCH UNIT: at 10:39

## 2017-08-22 RX ADMIN — Medication SCH MCG: at 06:10

## 2017-08-22 RX ADMIN — Medication SCH: at 20:40

## 2017-08-22 RX ADMIN — Medication SCH EACH: at 20:38

## 2017-08-22 RX ADMIN — Medication SCH MG: at 10:38

## 2017-08-22 RX ADMIN — MAGNESIUM OXIDE TAB 400 MG (241.3 MG ELEMENTAL MG) SCH: 400 (241.3 MG) TAB at 20:40

## 2017-08-22 RX ADMIN — Medication SCH MG: at 20:38

## 2017-08-23 RX ADMIN — Medication SCH EACH: at 18:11

## 2017-08-23 RX ADMIN — Medication SCH MG: at 09:42

## 2017-08-23 RX ADMIN — MAGNESIUM OXIDE TAB 400 MG (241.3 MG ELEMENTAL MG) SCH TAB: 400 (241.3 MG) TAB at 18:11

## 2017-08-23 RX ADMIN — Medication SCH MG: at 09:41

## 2017-08-23 RX ADMIN — Medication SCH MCG: at 06:09

## 2017-08-23 RX ADMIN — Medication SCH MG: at 18:12

## 2017-08-23 RX ADMIN — Medication SCH UNIT: at 09:42

## 2017-08-23 RX ADMIN — Medication SCH MG: at 18:11

## 2017-08-24 RX ADMIN — Medication SCH EACH: at 18:49

## 2017-08-24 RX ADMIN — Medication SCH MG: at 10:13

## 2017-08-24 RX ADMIN — Medication SCH MG: at 18:49

## 2017-08-24 RX ADMIN — Medication SCH MG: at 10:14

## 2017-08-24 RX ADMIN — MAGNESIUM OXIDE TAB 400 MG (241.3 MG ELEMENTAL MG) SCH TAB: 400 (241.3 MG) TAB at 18:50

## 2017-08-24 RX ADMIN — DEXTRAN 70, HYPROMELLOSE 2910 PRN EACH: 3; 1 SOLUTION/ DROPS OPHTHALMIC at 10:15

## 2017-08-24 RX ADMIN — Medication SCH UNIT: at 10:14

## 2017-08-24 RX ADMIN — Medication SCH: at 08:25

## 2017-08-25 RX ADMIN — Medication SCH MCG: at 06:12

## 2017-08-25 RX ADMIN — Medication SCH EACH: at 18:04

## 2017-08-25 RX ADMIN — Medication SCH MG: at 10:20

## 2017-08-25 RX ADMIN — MAGNESIUM OXIDE TAB 400 MG (241.3 MG ELEMENTAL MG) SCH TAB: 400 (241.3 MG) TAB at 18:04

## 2017-08-25 RX ADMIN — Medication SCH MG: at 18:04

## 2017-08-25 RX ADMIN — Medication SCH MG: at 18:03

## 2017-08-25 RX ADMIN — Medication SCH UNIT: at 10:20

## 2017-08-25 RX ADMIN — Medication SCH MG: at 10:21

## 2017-08-26 RX ADMIN — Medication SCH MG: at 18:35

## 2017-08-26 RX ADMIN — Medication SCH MG: at 10:58

## 2017-08-26 RX ADMIN — Medication SCH EACH: at 18:36

## 2017-08-26 RX ADMIN — MAGNESIUM OXIDE TAB 400 MG (241.3 MG ELEMENTAL MG) SCH TAB: 400 (241.3 MG) TAB at 18:36

## 2017-08-26 RX ADMIN — Medication SCH UNIT: at 10:57

## 2017-08-26 RX ADMIN — Medication SCH MG: at 11:00

## 2017-08-27 RX ADMIN — Medication SCH MG: at 09:50

## 2017-08-27 RX ADMIN — Medication SCH MG: at 09:48

## 2017-08-27 RX ADMIN — Medication SCH UNIT: at 09:48

## 2017-08-27 RX ADMIN — Medication SCH MCG: at 06:13

## 2017-08-27 RX ADMIN — Medication SCH MG: at 18:45

## 2017-08-27 RX ADMIN — MAGNESIUM OXIDE TAB 400 MG (241.3 MG ELEMENTAL MG) SCH TAB: 400 (241.3 MG) TAB at 18:45

## 2017-08-27 RX ADMIN — Medication SCH MG: at 18:44

## 2017-08-27 RX ADMIN — Medication SCH EACH: at 18:44

## 2017-08-28 RX ADMIN — DEXTRAN 70, HYPROMELLOSE 2910 PRN EACH: 3; 1 SOLUTION/ DROPS OPHTHALMIC at 18:02

## 2017-08-28 RX ADMIN — Medication SCH MG: at 09:47

## 2017-08-28 RX ADMIN — Medication SCH UNIT: at 09:47

## 2017-08-28 RX ADMIN — MAGNESIUM OXIDE TAB 400 MG (241.3 MG ELEMENTAL MG) SCH TAB: 400 (241.3 MG) TAB at 18:03

## 2017-08-28 RX ADMIN — Medication SCH MG: at 18:02

## 2017-08-28 RX ADMIN — Medication SCH EACH: at 18:03

## 2017-08-28 RX ADMIN — Medication SCH MG: at 09:46

## 2017-08-28 RX ADMIN — Medication SCH MCG: at 06:26

## 2017-08-29 RX ADMIN — Medication SCH MG: at 10:36

## 2017-08-29 RX ADMIN — Medication SCH MG: at 18:24

## 2017-08-29 RX ADMIN — Medication SCH MCG: at 06:42

## 2017-08-29 RX ADMIN — Medication SCH UNIT: at 10:36

## 2017-08-29 RX ADMIN — DEXTRAN 70, HYPROMELLOSE 2910 PRN EACH: 3; 1 SOLUTION/ DROPS OPHTHALMIC at 10:37

## 2017-08-29 RX ADMIN — Medication SCH EACH: at 18:24

## 2017-08-29 RX ADMIN — MAGNESIUM OXIDE TAB 400 MG (241.3 MG ELEMENTAL MG) SCH TAB: 400 (241.3 MG) TAB at 18:24

## 2017-08-30 RX ADMIN — Medication SCH MCG: at 06:37

## 2017-08-30 RX ADMIN — Medication SCH EACH: at 18:22

## 2017-08-30 RX ADMIN — Medication SCH UNIT: at 10:46

## 2017-08-30 RX ADMIN — Medication SCH MG: at 18:22

## 2017-08-30 RX ADMIN — MAGNESIUM OXIDE TAB 400 MG (241.3 MG ELEMENTAL MG) SCH TAB: 400 (241.3 MG) TAB at 18:22

## 2017-08-30 RX ADMIN — Medication SCH MG: at 10:46

## 2017-08-31 RX ADMIN — Medication SCH MG: at 10:23

## 2017-08-31 RX ADMIN — Medication SCH UNIT: at 10:23

## 2017-08-31 RX ADMIN — Medication SCH MCG: at 06:03

## 2017-08-31 RX ADMIN — Medication SCH MG: at 18:40

## 2017-08-31 RX ADMIN — Medication SCH MG: at 10:24

## 2017-08-31 RX ADMIN — MAGNESIUM OXIDE TAB 400 MG (241.3 MG ELEMENTAL MG) SCH TAB: 400 (241.3 MG) TAB at 18:39

## 2017-08-31 RX ADMIN — Medication SCH EACH: at 18:40

## 2017-09-01 RX ADMIN — Medication SCH MG: at 10:41

## 2017-09-01 RX ADMIN — Medication SCH EACH: at 18:04

## 2017-09-01 RX ADMIN — Medication SCH MG: at 10:40

## 2017-09-01 RX ADMIN — Medication SCH MG: at 18:04

## 2017-09-01 RX ADMIN — Medication SCH UNIT: at 10:40

## 2017-09-01 RX ADMIN — Medication SCH MG: at 18:03

## 2017-09-01 RX ADMIN — MAGNESIUM OXIDE TAB 400 MG (241.3 MG ELEMENTAL MG) SCH TAB: 400 (241.3 MG) TAB at 18:03

## 2017-09-01 RX ADMIN — Medication SCH MCG: at 06:40

## 2017-09-02 RX ADMIN — Medication SCH UNIT: at 09:48

## 2017-09-02 RX ADMIN — Medication SCH EACH: at 18:34

## 2017-09-02 RX ADMIN — MAGNESIUM OXIDE TAB 400 MG (241.3 MG ELEMENTAL MG) SCH TAB: 400 (241.3 MG) TAB at 18:34

## 2017-09-02 RX ADMIN — Medication SCH MG: at 09:48

## 2017-09-02 RX ADMIN — Medication SCH MG: at 18:34

## 2017-09-03 RX ADMIN — Medication SCH MCG: at 06:17

## 2017-09-03 RX ADMIN — Medication SCH MG: at 09:53

## 2017-09-03 RX ADMIN — MAGNESIUM OXIDE TAB 400 MG (241.3 MG ELEMENTAL MG) SCH TAB: 400 (241.3 MG) TAB at 18:17

## 2017-09-03 RX ADMIN — Medication SCH EACH: at 18:17

## 2017-09-03 RX ADMIN — Medication SCH UNIT: at 09:53

## 2017-09-03 RX ADMIN — Medication SCH MG: at 18:18

## 2017-09-03 RX ADMIN — Medication SCH MG: at 18:17

## 2017-09-04 RX ADMIN — Medication SCH MG: at 09:52

## 2017-09-04 RX ADMIN — MAGNESIUM OXIDE TAB 400 MG (241.3 MG ELEMENTAL MG) SCH TAB: 400 (241.3 MG) TAB at 18:10

## 2017-09-04 RX ADMIN — Medication SCH MG: at 09:51

## 2017-09-04 RX ADMIN — Medication SCH UNIT: at 09:52

## 2017-09-04 RX ADMIN — Medication SCH EACH: at 18:10

## 2017-09-04 RX ADMIN — Medication SCH MCG: at 06:44

## 2017-09-04 RX ADMIN — Medication SCH MG: at 18:10

## 2017-09-04 RX ADMIN — Medication SCH MG: at 18:09

## 2017-09-05 RX ADMIN — Medication SCH MG: at 17:59

## 2017-09-05 RX ADMIN — Medication SCH EACH: at 17:59

## 2017-09-05 RX ADMIN — Medication SCH MG: at 10:18

## 2017-09-05 RX ADMIN — MAGNESIUM OXIDE TAB 400 MG (241.3 MG ELEMENTAL MG) SCH TAB: 400 (241.3 MG) TAB at 17:59

## 2017-09-05 RX ADMIN — Medication SCH UNIT: at 10:17

## 2017-09-05 RX ADMIN — Medication SCH MCG: at 06:07

## 2017-09-05 RX ADMIN — Medication SCH MG: at 10:17

## 2017-09-06 RX ADMIN — Medication SCH MG: at 18:13

## 2017-09-06 RX ADMIN — Medication SCH EACH: at 18:13

## 2017-09-06 RX ADMIN — Medication SCH MG: at 18:14

## 2017-09-06 RX ADMIN — Medication SCH MCG: at 06:02

## 2017-09-06 RX ADMIN — Medication SCH UNIT: at 09:58

## 2017-09-06 RX ADMIN — MAGNESIUM OXIDE TAB 400 MG (241.3 MG ELEMENTAL MG) SCH TAB: 400 (241.3 MG) TAB at 18:13

## 2017-09-06 RX ADMIN — DEXTRAN 70, HYPROMELLOSE 2910 PRN EACH: 3; 1 SOLUTION/ DROPS OPHTHALMIC at 10:00

## 2017-09-06 RX ADMIN — Medication SCH MG: at 09:57

## 2017-09-07 RX ADMIN — Medication SCH MCG: at 06:00

## 2017-09-07 RX ADMIN — Medication SCH MG: at 09:51

## 2017-09-07 RX ADMIN — Medication SCH UNIT: at 09:51

## 2017-09-07 RX ADMIN — Medication SCH EACH: at 18:32

## 2017-09-07 RX ADMIN — MAGNESIUM OXIDE TAB 400 MG (241.3 MG ELEMENTAL MG) SCH TAB: 400 (241.3 MG) TAB at 18:31

## 2017-09-07 RX ADMIN — Medication SCH MG: at 18:32

## 2017-09-07 RX ADMIN — Medication SCH MG: at 09:53

## 2017-09-07 RX ADMIN — Medication SCH MG: at 18:31

## 2017-09-08 RX ADMIN — Medication SCH UNIT: at 10:44

## 2017-09-08 RX ADMIN — MAGNESIUM OXIDE TAB 400 MG (241.3 MG ELEMENTAL MG) SCH TAB: 400 (241.3 MG) TAB at 18:38

## 2017-09-08 RX ADMIN — Medication SCH EACH: at 18:37

## 2017-09-08 RX ADMIN — Medication SCH MG: at 18:37

## 2017-09-08 RX ADMIN — Medication SCH MCG: at 06:45

## 2017-09-08 RX ADMIN — Medication SCH MG: at 18:36

## 2017-09-08 RX ADMIN — Medication SCH MG: at 10:42

## 2017-09-08 RX ADMIN — Medication SCH MG: at 10:45

## 2017-09-09 RX ADMIN — Medication SCH UNIT: at 10:06

## 2017-09-09 RX ADMIN — Medication SCH MG: at 18:12

## 2017-09-09 RX ADMIN — Medication SCH MG: at 10:04

## 2017-09-09 RX ADMIN — Medication SCH MG: at 18:11

## 2017-09-09 RX ADMIN — Medication SCH MG: at 10:05

## 2017-09-09 RX ADMIN — MAGNESIUM OXIDE TAB 400 MG (241.3 MG ELEMENTAL MG) SCH TAB: 400 (241.3 MG) TAB at 18:12

## 2017-09-09 RX ADMIN — Medication SCH EACH: at 18:12

## 2017-09-10 RX ADMIN — Medication SCH MG: at 10:34

## 2017-09-10 RX ADMIN — Medication SCH UNIT: at 10:33

## 2017-09-10 RX ADMIN — MAGNESIUM OXIDE TAB 400 MG (241.3 MG ELEMENTAL MG) SCH TAB: 400 (241.3 MG) TAB at 18:56

## 2017-09-10 RX ADMIN — Medication SCH MG: at 10:32

## 2017-09-10 RX ADMIN — Medication SCH MCG: at 06:35

## 2017-09-10 RX ADMIN — Medication SCH MG: at 18:56

## 2017-09-10 RX ADMIN — Medication SCH EACH: at 18:55

## 2017-09-11 RX ADMIN — Medication SCH EACH: at 18:37

## 2017-09-11 RX ADMIN — MAGNESIUM OXIDE TAB 400 MG (241.3 MG ELEMENTAL MG) SCH TAB: 400 (241.3 MG) TAB at 18:37

## 2017-09-11 RX ADMIN — Medication SCH MG: at 09:50

## 2017-09-11 RX ADMIN — Medication SCH MG: at 09:49

## 2017-09-11 RX ADMIN — Medication SCH MCG: at 06:05

## 2017-09-11 RX ADMIN — Medication SCH UNIT: at 09:50

## 2017-09-11 RX ADMIN — Medication SCH MG: at 18:36

## 2017-09-12 RX ADMIN — Medication SCH MCG: at 06:06

## 2017-09-12 RX ADMIN — Medication SCH EACH: at 18:06

## 2017-09-12 RX ADMIN — MAGNESIUM OXIDE TAB 400 MG (241.3 MG ELEMENTAL MG) SCH TAB: 400 (241.3 MG) TAB at 18:06

## 2017-09-12 RX ADMIN — Medication SCH UNIT: at 10:30

## 2017-09-12 RX ADMIN — DEXTRAN 70, HYPROMELLOSE 2910 PRN EACH: 3; 1 SOLUTION/ DROPS OPHTHALMIC at 18:07

## 2017-09-12 RX ADMIN — Medication SCH MG: at 10:31

## 2017-09-12 RX ADMIN — Medication SCH MG: at 18:06

## 2017-09-13 RX ADMIN — Medication SCH MG: at 09:44

## 2017-09-13 RX ADMIN — Medication SCH MCG: at 06:14

## 2017-09-13 RX ADMIN — Medication SCH MG: at 18:07

## 2017-09-13 RX ADMIN — MAGNESIUM OXIDE TAB 400 MG (241.3 MG ELEMENTAL MG) SCH TAB: 400 (241.3 MG) TAB at 18:06

## 2017-09-13 RX ADMIN — Medication SCH MG: at 09:45

## 2017-09-13 RX ADMIN — Medication SCH EACH: at 18:07

## 2017-09-13 RX ADMIN — Medication SCH UNIT: at 09:45

## 2017-09-14 RX ADMIN — Medication SCH EACH: at 18:29

## 2017-09-14 RX ADMIN — Medication SCH UNIT: at 11:09

## 2017-09-14 RX ADMIN — Medication SCH MCG: at 06:10

## 2017-09-14 RX ADMIN — Medication SCH MG: at 18:29

## 2017-09-14 RX ADMIN — Medication SCH MG: at 11:09

## 2017-09-14 RX ADMIN — MAGNESIUM OXIDE TAB 400 MG (241.3 MG ELEMENTAL MG) SCH TAB: 400 (241.3 MG) TAB at 18:29

## 2017-09-15 RX ADMIN — Medication SCH UNIT: at 09:57

## 2017-09-15 RX ADMIN — MAGNESIUM OXIDE TAB 400 MG (241.3 MG ELEMENTAL MG) SCH TAB: 400 (241.3 MG) TAB at 17:59

## 2017-09-15 RX ADMIN — Medication SCH EACH: at 17:59

## 2017-09-15 RX ADMIN — Medication SCH MG: at 17:59

## 2017-09-15 RX ADMIN — Medication SCH MCG: at 06:04

## 2017-09-15 RX ADMIN — Medication SCH MG: at 09:56

## 2017-09-15 RX ADMIN — Medication SCH MG: at 09:58

## 2017-09-16 RX ADMIN — MAGNESIUM OXIDE TAB 400 MG (241.3 MG ELEMENTAL MG) SCH TAB: 400 (241.3 MG) TAB at 18:10

## 2017-09-16 RX ADMIN — LIDOCAINE HYDROCHLORIDE PRN APPLIC: 20 JELLY TOPICAL at 09:52

## 2017-09-16 RX ADMIN — Medication SCH EACH: at 18:10

## 2017-09-16 RX ADMIN — Medication SCH MG: at 09:51

## 2017-09-16 RX ADMIN — Medication SCH UNIT: at 09:51

## 2017-09-16 RX ADMIN — Medication SCH MG: at 18:10

## 2017-09-17 RX ADMIN — Medication SCH MCG: at 06:01

## 2017-09-17 RX ADMIN — Medication SCH MG: at 09:56

## 2017-09-17 RX ADMIN — LIDOCAINE HYDROCHLORIDE PRN APPLIC: 20 JELLY TOPICAL at 09:59

## 2017-09-17 RX ADMIN — Medication SCH MG: at 09:57

## 2017-09-17 RX ADMIN — Medication SCH MG: at 18:00

## 2017-09-17 RX ADMIN — LIDOCAINE HYDROCHLORIDE PRN APPLIC: 20 JELLY TOPICAL at 23:13

## 2017-09-17 RX ADMIN — Medication SCH UNIT: at 09:57

## 2017-09-17 RX ADMIN — Medication SCH EACH: at 18:00

## 2017-09-17 RX ADMIN — MAGNESIUM OXIDE TAB 400 MG (241.3 MG ELEMENTAL MG) SCH TAB: 400 (241.3 MG) TAB at 18:00

## 2017-09-17 RX ADMIN — Medication PRN APPLIC: at 23:11

## 2017-09-18 RX ADMIN — Medication SCH MG: at 10:43

## 2017-09-18 RX ADMIN — Medication SCH MG: at 19:52

## 2017-09-18 RX ADMIN — Medication SCH MCG: at 06:03

## 2017-09-18 RX ADMIN — Medication SCH EACH: at 19:53

## 2017-09-18 RX ADMIN — LIDOCAINE HYDROCHLORIDE PRN APPLIC: 20 JELLY TOPICAL at 10:44

## 2017-09-18 RX ADMIN — MAGNESIUM OXIDE TAB 400 MG (241.3 MG ELEMENTAL MG) SCH TAB: 400 (241.3 MG) TAB at 19:52

## 2017-09-18 RX ADMIN — Medication SCH UNIT: at 10:43

## 2017-09-19 RX ADMIN — Medication SCH MG: at 09:59

## 2017-09-19 RX ADMIN — Medication SCH: at 15:36

## 2017-09-19 RX ADMIN — Medication SCH MG: at 10:00

## 2017-09-19 RX ADMIN — Medication SCH EACH: at 23:20

## 2017-09-19 RX ADMIN — Medication SCH MCG: at 06:03

## 2017-09-19 RX ADMIN — Medication SCH MG: at 18:12

## 2017-09-19 RX ADMIN — Medication SCH MG: at 18:11

## 2017-09-19 RX ADMIN — Medication SCH: at 15:35

## 2017-09-19 RX ADMIN — Medication SCH EACH: at 18:12

## 2017-09-19 RX ADMIN — Medication SCH EACH: at 23:19

## 2017-09-19 RX ADMIN — MAGNESIUM OXIDE TAB 400 MG (241.3 MG ELEMENTAL MG) SCH TAB: 400 (241.3 MG) TAB at 18:12

## 2017-09-19 RX ADMIN — Medication SCH UNIT: at 10:00

## 2017-09-19 NOTE — PCM.SN
- Free Text/Narrative


Note: 





Nurse reports increased vaginal and labia irritation will restart clobetasol 

and resume every 3 day premarin.  I will evaluate if not improving.  She has 

had this problem off and on for quite some time and is using barrier creams.  

Has small amount of urine leakage.  Has seen GYN numerous times.

## 2017-09-20 RX ADMIN — Medication SCH MG: at 18:20

## 2017-09-20 RX ADMIN — Medication SCH MCG: at 06:10

## 2017-09-20 RX ADMIN — Medication SCH EACH: at 09:52

## 2017-09-20 RX ADMIN — LIDOCAINE HYDROCHLORIDE PRN APPLIC: 20 JELLY TOPICAL at 09:57

## 2017-09-20 RX ADMIN — Medication SCH EACH: at 23:22

## 2017-09-20 RX ADMIN — Medication SCH MG: at 09:50

## 2017-09-20 RX ADMIN — Medication SCH MG: at 18:18

## 2017-09-20 RX ADMIN — Medication SCH EACH: at 18:21

## 2017-09-20 RX ADMIN — MAGNESIUM OXIDE TAB 400 MG (241.3 MG ELEMENTAL MG) SCH TAB: 400 (241.3 MG) TAB at 18:20

## 2017-09-20 RX ADMIN — Medication SCH UNIT: at 09:50

## 2017-09-21 RX ADMIN — Medication SCH MG: at 09:43

## 2017-09-21 RX ADMIN — Medication SCH MG: at 19:58

## 2017-09-21 RX ADMIN — Medication SCH MG: at 09:42

## 2017-09-21 RX ADMIN — Medication SCH EACH: at 09:43

## 2017-09-21 RX ADMIN — MAGNESIUM OXIDE TAB 400 MG (241.3 MG ELEMENTAL MG) SCH TAB: 400 (241.3 MG) TAB at 19:59

## 2017-09-21 RX ADMIN — Medication SCH MCG: at 06:10

## 2017-09-21 RX ADMIN — Medication SCH UNIT: at 09:43

## 2017-09-21 RX ADMIN — Medication SCH MG: at 20:00

## 2017-09-21 RX ADMIN — Medication SCH EACH: at 23:17

## 2017-09-21 RX ADMIN — Medication SCH EACH: at 19:59

## 2017-09-22 RX ADMIN — Medication SCH EACH: at 22:59

## 2017-09-22 RX ADMIN — Medication SCH EACH: at 09:42

## 2017-09-22 RX ADMIN — Medication SCH MG: at 18:47

## 2017-09-22 RX ADMIN — Medication SCH UNIT: at 09:41

## 2017-09-22 RX ADMIN — Medication SCH MG: at 18:46

## 2017-09-22 RX ADMIN — Medication SCH EACH: at 23:04

## 2017-09-22 RX ADMIN — Medication SCH MG: at 09:40

## 2017-09-22 RX ADMIN — Medication SCH MCG: at 06:02

## 2017-09-22 RX ADMIN — Medication SCH MG: at 09:41

## 2017-09-22 RX ADMIN — MAGNESIUM OXIDE TAB 400 MG (241.3 MG ELEMENTAL MG) SCH TAB: 400 (241.3 MG) TAB at 18:47

## 2017-09-22 RX ADMIN — Medication SCH EACH: at 18:47

## 2017-09-23 RX ADMIN — Medication SCH MG: at 19:26

## 2017-09-23 RX ADMIN — Medication SCH EACH: at 09:41

## 2017-09-23 RX ADMIN — Medication SCH EACH: at 19:26

## 2017-09-23 RX ADMIN — Medication SCH UNIT: at 09:40

## 2017-09-23 RX ADMIN — MAGNESIUM OXIDE TAB 400 MG (241.3 MG ELEMENTAL MG) SCH TAB: 400 (241.3 MG) TAB at 19:26

## 2017-09-23 RX ADMIN — Medication SCH EACH: at 23:30

## 2017-09-23 RX ADMIN — Medication SCH MG: at 09:40

## 2017-09-24 RX ADMIN — DEXTRAN 70, HYPROMELLOSE 2910 PRN EACH: 3; 1 SOLUTION/ DROPS OPHTHALMIC at 23:14

## 2017-09-24 RX ADMIN — Medication SCH MCG: at 06:08

## 2017-09-24 RX ADMIN — Medication SCH MG: at 18:04

## 2017-09-24 RX ADMIN — Medication SCH EACH: at 23:13

## 2017-09-24 RX ADMIN — MAGNESIUM OXIDE TAB 400 MG (241.3 MG ELEMENTAL MG) SCH TAB: 400 (241.3 MG) TAB at 18:05

## 2017-09-24 RX ADMIN — Medication SCH EACH: at 18:05

## 2017-09-24 RX ADMIN — Medication SCH MG: at 10:07

## 2017-09-24 RX ADMIN — Medication SCH EACH: at 10:09

## 2017-09-24 RX ADMIN — Medication SCH UNIT: at 10:08

## 2017-09-25 RX ADMIN — Medication SCH MG: at 18:11

## 2017-09-25 RX ADMIN — Medication SCH EACH: at 23:33

## 2017-09-25 RX ADMIN — Medication SCH EACH: at 10:50

## 2017-09-25 RX ADMIN — Medication SCH MCG: at 06:01

## 2017-09-25 RX ADMIN — Medication SCH UNIT: at 10:49

## 2017-09-25 RX ADMIN — Medication SCH MG: at 10:49

## 2017-09-25 RX ADMIN — MAGNESIUM OXIDE TAB 400 MG (241.3 MG ELEMENTAL MG) SCH TAB: 400 (241.3 MG) TAB at 18:11

## 2017-09-25 RX ADMIN — Medication SCH EACH: at 18:10

## 2017-09-25 RX ADMIN — Medication SCH MG: at 10:48

## 2017-09-25 RX ADMIN — Medication SCH MG: at 18:10

## 2017-09-25 RX ADMIN — Medication SCH EACH: at 23:32

## 2017-09-25 NOTE — PN
Progress Note for JENIFER SHEETS  Date:  09/25/2017  Room #:

 

SUBJECTIVE:  This is an 88-year-old seen today for the purposes of evaluating

her labia and vaginal area.  She had a biopsy back in February that showed no

cancer, probably an underlying lichen condition.  She had been trialed on

Lotrisone and clobetasol cream.  She has been using A&D ointment.  Most recently

she had tried Calmoseptine.  Everything worked for a little while.  Last week I

was notified that her symptoms were worsening.  Therefore, I put her on Premarin

cream again as long with clobetasol twice daily, but it is not improving.  It is

burning, but not itching.  There is no whitish discharge, but she just has

moisture in her underwear, but she does not feel it is leaking urine.  She is

having underwear changed through the day.  She is wearing white cotton

underwear.  She is not wearing underwear at night.  Otherwise, she does spent a

fair amount of time sitting due to arthritis that limits her mobility.

 

PHYSICAL EXAMINATION:

Vital signs:  Show a temperature of 97.9, pulse 78, blood pressure 136/74,

respiratory rate 20, O2 95 on room air.

General:  She is in no acute distress.

:  Her lower groin area is examined.  Her labia majora looks okay.  Labia

minora just internally has a slight ulcer over the right side, also a slight

ulcer more inferiorly right around the anal area.  These were likely felt to be

lichen changes.  She does have atrophic vaginitis changes as well.

 

ASSESSMENT AND PLAN:  Recurrent issues with chronic vulvar and vaginal

irritation.  We will apply the clobetasol now just mildly internally twice daily

for 2 weeks to see if this helps.  We will continue her on twice weekly Premarin

cream.  We will arrange for her to see the gynecologist again, Dr. Emerson,

who did her biopsy back in February.  Discussed with her that at this point I do

not think Dermatology is going to provide much information.  It is important to

continue to change underwear routinely especially when day up, consider using a

pad.

 

 

MKA:  09/25/2017 12:43:11  MODL:  09/25/2017 13:01:52

Job #:  [id number]

## 2017-09-26 RX ADMIN — DEXTRAN 70, HYPROMELLOSE 2910 PRN EACH: 3; 1 SOLUTION/ DROPS OPHTHALMIC at 09:44

## 2017-09-26 RX ADMIN — Medication SCH UNIT: at 09:40

## 2017-09-26 RX ADMIN — Medication SCH EACH: at 22:56

## 2017-09-26 RX ADMIN — Medication SCH MG: at 17:59

## 2017-09-26 RX ADMIN — Medication SCH MG: at 09:40

## 2017-09-26 RX ADMIN — Medication SCH EACH: at 18:01

## 2017-09-26 RX ADMIN — Medication SCH MG: at 18:01

## 2017-09-26 RX ADMIN — MAGNESIUM OXIDE TAB 400 MG (241.3 MG ELEMENTAL MG) SCH TAB: 400 (241.3 MG) TAB at 18:01

## 2017-09-26 RX ADMIN — Medication SCH MCG: at 06:08

## 2017-09-26 RX ADMIN — Medication SCH EACH: at 09:39

## 2017-09-27 RX ADMIN — Medication SCH EACH: at 23:10

## 2017-09-27 RX ADMIN — Medication SCH MG: at 09:35

## 2017-09-27 RX ADMIN — Medication SCH MCG: at 06:00

## 2017-09-27 RX ADMIN — Medication SCH EACH: at 18:21

## 2017-09-27 RX ADMIN — Medication SCH MG: at 18:21

## 2017-09-27 RX ADMIN — MAGNESIUM OXIDE TAB 400 MG (241.3 MG ELEMENTAL MG) SCH TAB: 400 (241.3 MG) TAB at 18:21

## 2017-09-27 RX ADMIN — Medication SCH MG: at 09:34

## 2017-09-27 RX ADMIN — Medication SCH UNIT: at 09:35

## 2017-09-27 RX ADMIN — Medication SCH EACH: at 09:34

## 2017-09-28 RX ADMIN — Medication SCH MG: at 18:49

## 2017-09-28 RX ADMIN — Medication SCH EACH: at 18:48

## 2017-09-28 RX ADMIN — Medication SCH MG: at 10:35

## 2017-09-28 RX ADMIN — MAGNESIUM OXIDE TAB 400 MG (241.3 MG ELEMENTAL MG) SCH TAB: 400 (241.3 MG) TAB at 18:49

## 2017-09-28 RX ADMIN — Medication SCH MCG: at 06:00

## 2017-09-28 RX ADMIN — Medication SCH EACH: at 23:02

## 2017-09-28 RX ADMIN — Medication SCH MG: at 10:36

## 2017-09-28 RX ADMIN — Medication SCH UNIT: at 10:35

## 2017-09-28 RX ADMIN — Medication SCH EACH: at 10:35

## 2017-09-28 RX ADMIN — Medication SCH MG: at 18:48

## 2017-09-29 RX ADMIN — Medication SCH MCG: at 06:03

## 2017-09-29 RX ADMIN — Medication SCH MG: at 09:47

## 2017-09-29 RX ADMIN — Medication SCH MG: at 18:36

## 2017-09-29 RX ADMIN — Medication SCH EACH: at 09:48

## 2017-09-29 RX ADMIN — Medication SCH EACH: at 18:36

## 2017-09-29 RX ADMIN — Medication SCH MG: at 18:35

## 2017-09-29 RX ADMIN — Medication SCH EACH: at 23:32

## 2017-09-29 RX ADMIN — MAGNESIUM OXIDE TAB 400 MG (241.3 MG ELEMENTAL MG) SCH TAB: 400 (241.3 MG) TAB at 18:35

## 2017-09-29 RX ADMIN — Medication SCH UNIT: at 09:47

## 2017-09-30 RX ADMIN — Medication SCH MG: at 10:16

## 2017-09-30 RX ADMIN — MAGNESIUM OXIDE TAB 400 MG (241.3 MG ELEMENTAL MG) SCH TAB: 400 (241.3 MG) TAB at 18:46

## 2017-09-30 RX ADMIN — Medication SCH MG: at 10:17

## 2017-09-30 RX ADMIN — Medication SCH UNIT: at 10:17

## 2017-09-30 RX ADMIN — Medication SCH EACH: at 10:17

## 2017-09-30 RX ADMIN — Medication SCH EACH: at 23:33

## 2017-09-30 RX ADMIN — Medication SCH MG: at 18:45

## 2017-09-30 RX ADMIN — Medication SCH EACH: at 18:45

## 2017-10-01 RX ADMIN — Medication SCH MG: at 18:01

## 2017-10-01 RX ADMIN — Medication SCH MG: at 09:31

## 2017-10-01 RX ADMIN — Medication PRN APPLIC: at 23:25

## 2017-10-01 RX ADMIN — Medication SCH UNIT: at 09:31

## 2017-10-01 RX ADMIN — Medication SCH MCG: at 06:27

## 2017-10-01 RX ADMIN — Medication SCH EACH: at 09:32

## 2017-10-01 RX ADMIN — Medication SCH MG: at 18:02

## 2017-10-01 RX ADMIN — Medication SCH EACH: at 18:02

## 2017-10-01 RX ADMIN — Medication SCH EACH: at 23:17

## 2017-10-01 RX ADMIN — Medication SCH EACH: at 23:16

## 2017-10-01 RX ADMIN — MAGNESIUM OXIDE TAB 400 MG (241.3 MG ELEMENTAL MG) SCH TAB: 400 (241.3 MG) TAB at 18:02

## 2017-10-02 RX ADMIN — Medication SCH MG: at 18:24

## 2017-10-02 RX ADMIN — Medication SCH MCG: at 06:23

## 2017-10-02 RX ADMIN — Medication SCH UNIT: at 09:47

## 2017-10-02 RX ADMIN — Medication SCH MG: at 09:47

## 2017-10-02 RX ADMIN — Medication SCH MG: at 09:45

## 2017-10-02 RX ADMIN — Medication SCH EACH: at 23:35

## 2017-10-02 RX ADMIN — MAGNESIUM OXIDE TAB 400 MG (241.3 MG ELEMENTAL MG) SCH TAB: 400 (241.3 MG) TAB at 18:25

## 2017-10-02 RX ADMIN — Medication SCH EACH: at 09:47

## 2017-10-02 RX ADMIN — Medication SCH EACH: at 18:25

## 2017-10-02 RX ADMIN — Medication SCH MG: at 18:25

## 2017-10-03 RX ADMIN — Medication SCH MG: at 10:03

## 2017-10-03 RX ADMIN — Medication SCH EACH: at 10:03

## 2017-10-03 RX ADMIN — Medication SCH MCG: at 06:39

## 2017-10-03 RX ADMIN — Medication SCH MG: at 10:02

## 2017-10-03 RX ADMIN — Medication SCH UNIT: at 10:03

## 2017-10-03 RX ADMIN — Medication SCH EACH: at 18:31

## 2017-10-03 RX ADMIN — MAGNESIUM OXIDE TAB 400 MG (241.3 MG ELEMENTAL MG) SCH TAB: 400 (241.3 MG) TAB at 18:31

## 2017-10-03 RX ADMIN — Medication SCH MG: at 18:31

## 2017-10-03 RX ADMIN — Medication SCH EACH: at 23:28

## 2017-10-04 RX ADMIN — Medication SCH EACH: at 18:27

## 2017-10-04 RX ADMIN — Medication SCH EACH: at 22:55

## 2017-10-04 RX ADMIN — Medication SCH EACH: at 22:54

## 2017-10-04 RX ADMIN — Medication SCH UNIT: at 09:51

## 2017-10-04 RX ADMIN — Medication SCH MG: at 09:51

## 2017-10-04 RX ADMIN — Medication SCH MG: at 18:27

## 2017-10-04 RX ADMIN — Medication SCH EACH: at 09:45

## 2017-10-04 RX ADMIN — MAGNESIUM OXIDE TAB 400 MG (241.3 MG ELEMENTAL MG) SCH TAB: 400 (241.3 MG) TAB at 18:28

## 2017-10-04 RX ADMIN — Medication SCH MG: at 18:28

## 2017-10-04 RX ADMIN — Medication SCH MCG: at 06:07

## 2017-10-05 RX ADMIN — Medication SCH MG: at 10:20

## 2017-10-05 RX ADMIN — Medication SCH UNIT: at 10:21

## 2017-10-05 RX ADMIN — Medication SCH MG: at 20:00

## 2017-10-05 RX ADMIN — Medication SCH EACH: at 10:22

## 2017-10-05 RX ADMIN — Medication SCH EACH: at 20:00

## 2017-10-05 RX ADMIN — Medication SCH MCG: at 06:34

## 2017-10-05 RX ADMIN — Medication SCH EACH: at 22:58

## 2017-10-05 RX ADMIN — MAGNESIUM OXIDE TAB 400 MG (241.3 MG ELEMENTAL MG) SCH TAB: 400 (241.3 MG) TAB at 20:00

## 2017-10-05 RX ADMIN — Medication SCH MG: at 10:21

## 2017-10-06 RX ADMIN — MAGNESIUM OXIDE TAB 400 MG (241.3 MG ELEMENTAL MG) SCH TAB: 400 (241.3 MG) TAB at 18:04

## 2017-10-06 RX ADMIN — Medication SCH MG: at 18:04

## 2017-10-06 RX ADMIN — Medication SCH UNIT: at 09:38

## 2017-10-06 RX ADMIN — Medication SCH EACH: at 18:04

## 2017-10-06 RX ADMIN — Medication SCH EACH: at 09:37

## 2017-10-06 RX ADMIN — Medication SCH MCG: at 06:55

## 2017-10-06 RX ADMIN — Medication SCH EACH: at 22:49

## 2017-10-06 RX ADMIN — Medication SCH MG: at 09:38

## 2017-10-07 RX ADMIN — Medication SCH MG: at 09:42

## 2017-10-07 RX ADMIN — Medication SCH MG: at 21:08

## 2017-10-07 RX ADMIN — Medication SCH UNIT: at 09:40

## 2017-10-07 RX ADMIN — Medication SCH EACH: at 09:38

## 2017-10-07 RX ADMIN — Medication SCH EACH: at 21:07

## 2017-10-07 RX ADMIN — DEXTRAN 70, HYPROMELLOSE 2910 PRN EACH: 3; 1 SOLUTION/ DROPS OPHTHALMIC at 09:46

## 2017-10-07 RX ADMIN — Medication SCH EACH: at 21:09

## 2017-10-07 RX ADMIN — Medication SCH EACH: at 21:10

## 2017-10-07 RX ADMIN — MAGNESIUM OXIDE TAB 400 MG (241.3 MG ELEMENTAL MG) SCH TAB: 400 (241.3 MG) TAB at 21:08

## 2017-10-07 RX ADMIN — Medication SCH MG: at 09:41

## 2017-10-07 RX ADMIN — Medication SCH MG: at 21:09

## 2017-10-08 RX ADMIN — Medication SCH EACH: at 22:00

## 2017-10-08 RX ADMIN — Medication SCH EACH: at 18:35

## 2017-10-08 RX ADMIN — MAGNESIUM OXIDE TAB 400 MG (241.3 MG ELEMENTAL MG) SCH TAB: 400 (241.3 MG) TAB at 18:35

## 2017-10-08 RX ADMIN — Medication SCH MG: at 09:32

## 2017-10-08 RX ADMIN — Medication SCH MG: at 09:33

## 2017-10-08 RX ADMIN — Medication SCH EACH: at 09:33

## 2017-10-08 RX ADMIN — Medication SCH UNIT: at 09:33

## 2017-10-08 RX ADMIN — Medication SCH MCG: at 06:03

## 2017-10-08 RX ADMIN — Medication SCH MG: at 18:34

## 2017-10-09 RX ADMIN — Medication SCH EACH: at 10:09

## 2017-10-09 RX ADMIN — Medication SCH MG: at 10:08

## 2017-10-09 RX ADMIN — Medication SCH EACH: at 18:18

## 2017-10-09 RX ADMIN — MAGNESIUM OXIDE TAB 400 MG (241.3 MG ELEMENTAL MG) SCH TAB: 400 (241.3 MG) TAB at 18:19

## 2017-10-09 RX ADMIN — Medication SCH MCG: at 06:03

## 2017-10-09 RX ADMIN — Medication SCH UNIT: at 10:08

## 2017-10-09 RX ADMIN — Medication SCH MG: at 18:19

## 2017-10-09 RX ADMIN — Medication SCH MG: at 10:09

## 2017-10-10 RX ADMIN — Medication SCH MCG: at 06:05

## 2017-10-10 RX ADMIN — Medication SCH EACH: at 23:06

## 2017-10-10 RX ADMIN — MAGNESIUM OXIDE TAB 400 MG (241.3 MG ELEMENTAL MG) SCH TAB: 400 (241.3 MG) TAB at 18:32

## 2017-10-10 RX ADMIN — Medication SCH UNIT: at 09:43

## 2017-10-10 RX ADMIN — Medication SCH MG: at 09:43

## 2017-10-10 RX ADMIN — Medication SCH EACH: at 18:32

## 2017-10-10 RX ADMIN — Medication SCH MG: at 18:32

## 2017-10-11 RX ADMIN — Medication SCH UNIT: at 09:29

## 2017-10-11 RX ADMIN — Medication SCH MCG: at 06:12

## 2017-10-11 RX ADMIN — MAGNESIUM OXIDE TAB 400 MG (241.3 MG ELEMENTAL MG) SCH TAB: 400 (241.3 MG) TAB at 19:12

## 2017-10-11 RX ADMIN — Medication SCH EACH: at 19:12

## 2017-10-11 RX ADMIN — Medication SCH MG: at 09:30

## 2017-10-11 RX ADMIN — Medication SCH MG: at 19:11

## 2017-10-12 RX ADMIN — Medication SCH MG: at 09:26

## 2017-10-12 RX ADMIN — DEXTRAN 70, HYPROMELLOSE 2910 PRN EACH: 3; 1 SOLUTION/ DROPS OPHTHALMIC at 10:11

## 2017-10-12 RX ADMIN — Medication SCH MG: at 18:14

## 2017-10-12 RX ADMIN — Medication SCH EACH: at 18:14

## 2017-10-12 RX ADMIN — Medication SCH MCG: at 06:21

## 2017-10-12 RX ADMIN — Medication SCH UNIT: at 09:26

## 2017-10-12 RX ADMIN — MAGNESIUM OXIDE TAB 400 MG (241.3 MG ELEMENTAL MG) SCH TAB: 400 (241.3 MG) TAB at 18:14

## 2017-10-13 RX ADMIN — Medication SCH UNIT: at 10:37

## 2017-10-13 RX ADMIN — Medication SCH MG: at 10:38

## 2017-10-13 RX ADMIN — Medication SCH EACH: at 23:24

## 2017-10-13 RX ADMIN — Medication SCH MCG: at 06:27

## 2017-10-13 RX ADMIN — Medication SCH EACH: at 18:08

## 2017-10-13 RX ADMIN — MAGNESIUM OXIDE TAB 400 MG (241.3 MG ELEMENTAL MG) SCH TAB: 400 (241.3 MG) TAB at 18:08

## 2017-10-13 RX ADMIN — Medication SCH MG: at 18:08

## 2017-10-14 RX ADMIN — Medication SCH MG: at 19:27

## 2017-10-14 RX ADMIN — Medication SCH UNIT: at 10:18

## 2017-10-14 RX ADMIN — Medication SCH MG: at 19:26

## 2017-10-14 RX ADMIN — Medication SCH MG: at 10:19

## 2017-10-14 RX ADMIN — Medication SCH MG: at 10:21

## 2017-10-14 RX ADMIN — Medication SCH EACH: at 19:26

## 2017-10-14 RX ADMIN — MAGNESIUM OXIDE TAB 400 MG (241.3 MG ELEMENTAL MG) SCH TAB: 400 (241.3 MG) TAB at 19:27

## 2017-10-15 RX ADMIN — MAGNESIUM OXIDE TAB 400 MG (241.3 MG ELEMENTAL MG) SCH TAB: 400 (241.3 MG) TAB at 18:54

## 2017-10-15 RX ADMIN — Medication SCH MG: at 18:54

## 2017-10-15 RX ADMIN — Medication SCH MCG: at 06:16

## 2017-10-15 RX ADMIN — Medication SCH MG: at 18:53

## 2017-10-15 RX ADMIN — Medication SCH MG: at 09:54

## 2017-10-15 RX ADMIN — Medication SCH MG: at 09:55

## 2017-10-15 RX ADMIN — Medication SCH EACH: at 18:54

## 2017-10-15 RX ADMIN — Medication SCH UNIT: at 09:55

## 2017-10-16 RX ADMIN — Medication SCH MG: at 10:59

## 2017-10-16 RX ADMIN — Medication SCH MG: at 11:00

## 2017-10-16 RX ADMIN — Medication SCH MCG: at 06:11

## 2017-10-16 RX ADMIN — MAGNESIUM OXIDE TAB 400 MG (241.3 MG ELEMENTAL MG) SCH TAB: 400 (241.3 MG) TAB at 18:15

## 2017-10-16 RX ADMIN — Medication SCH EACH: at 18:15

## 2017-10-16 RX ADMIN — Medication PRN APPLICFUL: at 23:19

## 2017-10-16 RX ADMIN — Medication SCH MG: at 18:15

## 2017-10-16 RX ADMIN — Medication SCH EACH: at 23:20

## 2017-10-16 RX ADMIN — Medication SCH UNIT: at 10:59

## 2017-10-16 RX ADMIN — CLOTRIMAZOLE AND BETAMETHASONE DIPROPIONATE SCH APPLIC: 10; .5 CREAM TOPICAL at 23:19

## 2017-10-17 RX ADMIN — Medication SCH MG: at 09:43

## 2017-10-17 RX ADMIN — CLOTRIMAZOLE AND BETAMETHASONE DIPROPIONATE SCH APPLIC: 10; .5 CREAM TOPICAL at 09:45

## 2017-10-17 RX ADMIN — Medication PRN APPLICFUL: at 09:47

## 2017-10-17 RX ADMIN — CLOTRIMAZOLE AND BETAMETHASONE DIPROPIONATE SCH APPLIC: 10; .5 CREAM TOPICAL at 23:26

## 2017-10-17 RX ADMIN — Medication SCH MG: at 09:44

## 2017-10-17 RX ADMIN — MAGNESIUM OXIDE TAB 400 MG (241.3 MG ELEMENTAL MG) SCH TAB: 400 (241.3 MG) TAB at 18:21

## 2017-10-17 RX ADMIN — Medication SCH EACH: at 18:21

## 2017-10-17 RX ADMIN — Medication SCH MG: at 18:20

## 2017-10-17 RX ADMIN — Medication SCH MCG: at 06:06

## 2017-10-17 RX ADMIN — Medication SCH UNIT: at 09:44

## 2017-10-18 RX ADMIN — Medication SCH MG: at 18:11

## 2017-10-18 RX ADMIN — Medication SCH UNIT: at 09:21

## 2017-10-18 RX ADMIN — DEXTRAN 70, HYPROMELLOSE 2910 PRN EACH: 3; 1 SOLUTION/ DROPS OPHTHALMIC at 18:11

## 2017-10-18 RX ADMIN — Medication SCH EACH: at 18:11

## 2017-10-18 RX ADMIN — CLOTRIMAZOLE AND BETAMETHASONE DIPROPIONATE SCH APPLIC: 10; .5 CREAM TOPICAL at 23:30

## 2017-10-18 RX ADMIN — Medication PRN APPLICFUL: at 09:26

## 2017-10-18 RX ADMIN — Medication SCH MG: at 09:21

## 2017-10-18 RX ADMIN — MAGNESIUM OXIDE TAB 400 MG (241.3 MG ELEMENTAL MG) SCH TAB: 400 (241.3 MG) TAB at 18:11

## 2017-10-18 RX ADMIN — CLOTRIMAZOLE AND BETAMETHASONE DIPROPIONATE SCH APPLIC: 10; .5 CREAM TOPICAL at 09:22

## 2017-10-18 RX ADMIN — Medication SCH MCG: at 06:15

## 2017-10-19 RX ADMIN — Medication SCH UNIT: at 09:41

## 2017-10-19 RX ADMIN — Medication PRN APPLICFUL: at 23:26

## 2017-10-19 RX ADMIN — MAGNESIUM OXIDE TAB 400 MG (241.3 MG ELEMENTAL MG) SCH TAB: 400 (241.3 MG) TAB at 20:41

## 2017-10-19 RX ADMIN — Medication SCH MCG: at 06:45

## 2017-10-19 RX ADMIN — Medication SCH EACH: at 20:41

## 2017-10-19 RX ADMIN — Medication PRN APPLIC: at 23:32

## 2017-10-19 RX ADMIN — CLOTRIMAZOLE AND BETAMETHASONE DIPROPIONATE SCH APPLIC: 10; .5 CREAM TOPICAL at 23:23

## 2017-10-19 RX ADMIN — Medication SCH MG: at 09:42

## 2017-10-19 RX ADMIN — Medication PRN APPLICFUL: at 09:41

## 2017-10-19 RX ADMIN — CLOTRIMAZOLE AND BETAMETHASONE DIPROPIONATE SCH APPLIC: 10; .5 CREAM TOPICAL at 09:38

## 2017-10-19 RX ADMIN — Medication SCH MG: at 20:40

## 2017-10-19 RX ADMIN — Medication SCH MG: at 20:41

## 2017-10-19 RX ADMIN — Medication SCH MG: at 09:41

## 2017-10-20 RX ADMIN — Medication SCH UNIT: at 10:08

## 2017-10-20 RX ADMIN — MAGNESIUM OXIDE TAB 400 MG (241.3 MG ELEMENTAL MG) SCH TAB: 400 (241.3 MG) TAB at 18:51

## 2017-10-20 RX ADMIN — Medication SCH MG: at 10:08

## 2017-10-20 RX ADMIN — Medication PRN APPLICFUL: at 23:39

## 2017-10-20 RX ADMIN — Medication SCH MCG: at 06:16

## 2017-10-20 RX ADMIN — Medication PRN APPLIC: at 23:42

## 2017-10-20 RX ADMIN — Medication SCH MG: at 10:09

## 2017-10-20 RX ADMIN — CLOTRIMAZOLE AND BETAMETHASONE DIPROPIONATE SCH APPLIC: 10; .5 CREAM TOPICAL at 10:09

## 2017-10-20 RX ADMIN — CLOTRIMAZOLE AND BETAMETHASONE DIPROPIONATE SCH APPLIC: 10; .5 CREAM TOPICAL at 23:37

## 2017-10-20 RX ADMIN — Medication SCH MG: at 18:52

## 2017-10-20 RX ADMIN — Medication SCH EACH: at 18:51

## 2017-10-21 RX ADMIN — CLOTRIMAZOLE AND BETAMETHASONE DIPROPIONATE SCH APPLIC: 10; .5 CREAM TOPICAL at 09:46

## 2017-10-21 RX ADMIN — Medication SCH MG: at 09:45

## 2017-10-21 RX ADMIN — CLOTRIMAZOLE AND BETAMETHASONE DIPROPIONATE SCH APPLIC: 10; .5 CREAM TOPICAL at 23:39

## 2017-10-21 RX ADMIN — MAGNESIUM OXIDE TAB 400 MG (241.3 MG ELEMENTAL MG) SCH TAB: 400 (241.3 MG) TAB at 18:52

## 2017-10-21 RX ADMIN — Medication PRN APPLIC: at 23:40

## 2017-10-21 RX ADMIN — Medication SCH EACH: at 18:52

## 2017-10-21 RX ADMIN — DEXTRAN 70, HYPROMELLOSE 2910 PRN EACH: 3; 1 SOLUTION/ DROPS OPHTHALMIC at 09:47

## 2017-10-21 RX ADMIN — Medication PRN APPLICFUL: at 09:47

## 2017-10-21 RX ADMIN — Medication SCH MG: at 18:52

## 2017-10-21 RX ADMIN — Medication SCH UNIT: at 09:45

## 2017-10-21 RX ADMIN — Medication PRN APPLICFUL: at 23:39

## 2017-10-22 RX ADMIN — Medication PRN APPLIC: at 23:25

## 2017-10-22 RX ADMIN — Medication SCH MG: at 09:31

## 2017-10-22 RX ADMIN — Medication SCH MCG: at 06:39

## 2017-10-22 RX ADMIN — Medication PRN APPLICFUL: at 23:25

## 2017-10-22 RX ADMIN — MAGNESIUM OXIDE TAB 400 MG (241.3 MG ELEMENTAL MG) SCH TAB: 400 (241.3 MG) TAB at 18:29

## 2017-10-22 RX ADMIN — Medication SCH UNIT: at 09:31

## 2017-10-22 RX ADMIN — Medication SCH MG: at 18:28

## 2017-10-22 RX ADMIN — Medication SCH EACH: at 23:25

## 2017-10-22 RX ADMIN — Medication SCH EACH: at 18:28

## 2017-10-22 RX ADMIN — Medication SCH MG: at 18:29

## 2017-10-22 RX ADMIN — CLOTRIMAZOLE AND BETAMETHASONE DIPROPIONATE SCH APPLIC: 10; .5 CREAM TOPICAL at 23:24

## 2017-10-22 RX ADMIN — CLOTRIMAZOLE AND BETAMETHASONE DIPROPIONATE SCH APPLIC: 10; .5 CREAM TOPICAL at 09:31

## 2017-10-23 RX ADMIN — Medication SCH MG: at 18:12

## 2017-10-23 RX ADMIN — Medication SCH MG: at 18:13

## 2017-10-23 RX ADMIN — Medication SCH EACH: at 18:13

## 2017-10-23 RX ADMIN — Medication SCH MCG: at 07:50

## 2017-10-23 RX ADMIN — MAGNESIUM OXIDE TAB 400 MG (241.3 MG ELEMENTAL MG) SCH TAB: 400 (241.3 MG) TAB at 18:12

## 2017-10-23 RX ADMIN — Medication SCH MG: at 09:37

## 2017-10-23 RX ADMIN — Medication SCH UNIT: at 09:37

## 2017-10-23 RX ADMIN — CLOTRIMAZOLE AND BETAMETHASONE DIPROPIONATE SCH APPLIC: 10; .5 CREAM TOPICAL at 09:38

## 2017-10-23 RX ADMIN — CLOTRIMAZOLE AND BETAMETHASONE DIPROPIONATE SCH APPLIC: 10; .5 CREAM TOPICAL at 23:31

## 2017-10-23 RX ADMIN — Medication PRN APPLICFUL: at 09:39

## 2017-10-23 RX ADMIN — Medication SCH MG: at 09:38

## 2017-10-24 RX ADMIN — CLOTRIMAZOLE AND BETAMETHASONE DIPROPIONATE SCH APPLIC: 10; .5 CREAM TOPICAL at 09:31

## 2017-10-24 RX ADMIN — Medication SCH MG: at 09:30

## 2017-10-24 RX ADMIN — MAGNESIUM OXIDE TAB 400 MG (241.3 MG ELEMENTAL MG) SCH TAB: 400 (241.3 MG) TAB at 18:33

## 2017-10-24 RX ADMIN — Medication SCH MG: at 18:33

## 2017-10-24 RX ADMIN — Medication SCH MG: at 09:29

## 2017-10-24 RX ADMIN — Medication SCH EACH: at 18:33

## 2017-10-24 RX ADMIN — CLOTRIMAZOLE AND BETAMETHASONE DIPROPIONATE SCH APPLIC: 10; .5 CREAM TOPICAL at 23:30

## 2017-10-24 RX ADMIN — Medication SCH UNIT: at 09:31

## 2017-10-24 RX ADMIN — Medication SCH MCG: at 06:11

## 2017-10-25 RX ADMIN — MAGNESIUM OXIDE TAB 400 MG (241.3 MG ELEMENTAL MG) SCH TAB: 400 (241.3 MG) TAB at 18:26

## 2017-10-25 RX ADMIN — CLOTRIMAZOLE AND BETAMETHASONE DIPROPIONATE SCH APPLIC: 10; .5 CREAM TOPICAL at 09:59

## 2017-10-25 RX ADMIN — Medication SCH MG: at 09:59

## 2017-10-25 RX ADMIN — Medication SCH MG: at 18:27

## 2017-10-25 RX ADMIN — CLOTRIMAZOLE AND BETAMETHASONE DIPROPIONATE SCH APPLIC: 10; .5 CREAM TOPICAL at 23:28

## 2017-10-25 RX ADMIN — Medication SCH UNIT: at 09:59

## 2017-10-25 RX ADMIN — Medication SCH MCG: at 06:06

## 2017-10-25 RX ADMIN — Medication SCH EACH: at 18:27

## 2017-10-25 RX ADMIN — Medication PRN APPLICFUL: at 10:01

## 2017-10-25 RX ADMIN — Medication SCH MG: at 10:00

## 2017-10-26 RX ADMIN — Medication SCH EACH: at 18:50

## 2017-10-26 RX ADMIN — CLOTRIMAZOLE AND BETAMETHASONE DIPROPIONATE SCH APPLIC: 10; .5 CREAM TOPICAL at 09:55

## 2017-10-26 RX ADMIN — Medication SCH MG: at 18:50

## 2017-10-26 RX ADMIN — MAGNESIUM OXIDE TAB 400 MG (241.3 MG ELEMENTAL MG) SCH TAB: 400 (241.3 MG) TAB at 18:50

## 2017-10-26 RX ADMIN — Medication SCH MG: at 09:54

## 2017-10-26 RX ADMIN — Medication SCH UNIT: at 09:54

## 2017-10-26 RX ADMIN — CLOTRIMAZOLE AND BETAMETHASONE DIPROPIONATE SCH APPLIC: 10; .5 CREAM TOPICAL at 23:46

## 2017-10-26 RX ADMIN — Medication SCH MCG: at 06:22

## 2017-10-27 RX ADMIN — Medication SCH MG: at 09:39

## 2017-10-27 RX ADMIN — Medication SCH UNIT: at 09:39

## 2017-10-27 RX ADMIN — CLOTRIMAZOLE AND BETAMETHASONE DIPROPIONATE SCH APPLIC: 10; .5 CREAM TOPICAL at 23:33

## 2017-10-27 RX ADMIN — Medication PRN APPLIC: at 23:34

## 2017-10-27 RX ADMIN — MAGNESIUM OXIDE TAB 400 MG (241.3 MG ELEMENTAL MG) SCH TAB: 400 (241.3 MG) TAB at 18:13

## 2017-10-27 RX ADMIN — Medication SCH MG: at 09:41

## 2017-10-27 RX ADMIN — Medication SCH EACH: at 18:13

## 2017-10-27 RX ADMIN — CLOTRIMAZOLE AND BETAMETHASONE DIPROPIONATE SCH APPLIC: 10; .5 CREAM TOPICAL at 09:41

## 2017-10-27 RX ADMIN — Medication SCH MG: at 18:13

## 2017-10-27 RX ADMIN — Medication SCH MCG: at 06:47

## 2017-10-27 RX ADMIN — Medication PRN APPLICFUL: at 23:34

## 2017-10-28 RX ADMIN — Medication SCH MG: at 18:21

## 2017-10-28 RX ADMIN — Medication PRN APPLIC: at 23:41

## 2017-10-28 RX ADMIN — CLOTRIMAZOLE AND BETAMETHASONE DIPROPIONATE SCH APPLIC: 10; .5 CREAM TOPICAL at 23:40

## 2017-10-28 RX ADMIN — MAGNESIUM OXIDE TAB 400 MG (241.3 MG ELEMENTAL MG) SCH TAB: 400 (241.3 MG) TAB at 18:21

## 2017-10-28 RX ADMIN — Medication SCH EACH: at 18:21

## 2017-10-28 RX ADMIN — Medication SCH MG: at 09:51

## 2017-10-28 RX ADMIN — Medication PRN APPLICFUL: at 23:41

## 2017-10-28 RX ADMIN — CLOTRIMAZOLE AND BETAMETHASONE DIPROPIONATE SCH APPLIC: 10; .5 CREAM TOPICAL at 09:51

## 2017-10-28 RX ADMIN — Medication SCH UNIT: at 09:51

## 2017-10-29 RX ADMIN — Medication SCH MG: at 18:19

## 2017-10-29 RX ADMIN — Medication SCH MG: at 09:44

## 2017-10-29 RX ADMIN — CLOTRIMAZOLE AND BETAMETHASONE DIPROPIONATE SCH APPLIC: 10; .5 CREAM TOPICAL at 09:44

## 2017-10-29 RX ADMIN — Medication SCH EACH: at 18:19

## 2017-10-29 RX ADMIN — Medication SCH MG: at 18:18

## 2017-10-29 RX ADMIN — MAGNESIUM OXIDE TAB 400 MG (241.3 MG ELEMENTAL MG) SCH TAB: 400 (241.3 MG) TAB at 18:18

## 2017-10-29 RX ADMIN — Medication SCH EACH: at 23:40

## 2017-10-29 RX ADMIN — Medication SCH MCG: at 08:01

## 2017-10-29 RX ADMIN — Medication PRN APPLIC: at 17:28

## 2017-10-29 RX ADMIN — Medication PRN APPLICFUL: at 23:41

## 2017-10-29 RX ADMIN — Medication SCH MG: at 09:43

## 2017-10-29 RX ADMIN — Medication PRN APPLIC: at 23:41

## 2017-10-29 RX ADMIN — Medication SCH UNIT: at 09:43

## 2017-10-29 RX ADMIN — CLOTRIMAZOLE AND BETAMETHASONE DIPROPIONATE SCH APPLIC: 10; .5 CREAM TOPICAL at 23:40

## 2017-10-30 RX ADMIN — Medication SCH MCG: at 06:25

## 2017-10-30 RX ADMIN — Medication SCH MG: at 18:18

## 2017-10-30 RX ADMIN — Medication SCH UNIT: at 09:42

## 2017-10-30 RX ADMIN — CLOTRIMAZOLE AND BETAMETHASONE DIPROPIONATE SCH APPLIC: 10; .5 CREAM TOPICAL at 23:38

## 2017-10-30 RX ADMIN — CLOTRIMAZOLE AND BETAMETHASONE DIPROPIONATE SCH APPLIC: 10; .5 CREAM TOPICAL at 09:42

## 2017-10-30 RX ADMIN — Medication SCH EACH: at 18:18

## 2017-10-30 RX ADMIN — Medication SCH MG: at 18:17

## 2017-10-30 RX ADMIN — Medication SCH MG: at 09:42

## 2017-10-30 RX ADMIN — MAGNESIUM OXIDE TAB 400 MG (241.3 MG ELEMENTAL MG) SCH TAB: 400 (241.3 MG) TAB at 18:18

## 2017-10-30 RX ADMIN — Medication PRN APPLIC: at 23:38

## 2017-10-30 RX ADMIN — Medication PRN APPLICFUL: at 23:39

## 2017-10-31 RX ADMIN — Medication SCH MG: at 10:17

## 2017-10-31 RX ADMIN — Medication SCH EACH: at 18:02

## 2017-10-31 RX ADMIN — Medication SCH UNIT: at 10:16

## 2017-10-31 RX ADMIN — Medication SCH MCG: at 06:34

## 2017-10-31 RX ADMIN — CLOTRIMAZOLE AND BETAMETHASONE DIPROPIONATE SCH APPLIC: 10; .5 CREAM TOPICAL at 10:18

## 2017-10-31 RX ADMIN — Medication SCH MG: at 18:02

## 2017-10-31 RX ADMIN — MAGNESIUM OXIDE TAB 400 MG (241.3 MG ELEMENTAL MG) SCH TAB: 400 (241.3 MG) TAB at 18:03

## 2017-10-31 RX ADMIN — Medication SCH MG: at 10:16

## 2017-11-01 LAB
CHLORIDE SERPL-SCNC: 105 MMOL/L (ref 98–107)
SODIUM SERPL-SCNC: 143 MMOL/L (ref 136–145)

## 2017-11-01 RX ADMIN — Medication SCH EACH: at 18:03

## 2017-11-01 RX ADMIN — Medication SCH MCG: at 06:07

## 2017-11-01 RX ADMIN — CLOTRIMAZOLE AND BETAMETHASONE DIPROPIONATE SCH APPLICFUL: 10; .5 CREAM TOPICAL at 10:58

## 2017-11-01 RX ADMIN — Medication SCH MG: at 10:58

## 2017-11-01 RX ADMIN — Medication SCH MG: at 10:57

## 2017-11-01 RX ADMIN — Medication SCH UNIT: at 10:58

## 2017-11-01 RX ADMIN — MAGNESIUM OXIDE TAB 400 MG (241.3 MG ELEMENTAL MG) SCH TAB: 400 (241.3 MG) TAB at 18:02

## 2017-11-01 RX ADMIN — Medication SCH MG: at 18:03

## 2017-11-01 RX ADMIN — Medication SCH MG: at 18:04

## 2017-11-02 RX ADMIN — MAGNESIUM OXIDE TAB 400 MG (241.3 MG ELEMENTAL MG) SCH TAB: 400 (241.3 MG) TAB at 18:31

## 2017-11-02 RX ADMIN — Medication SCH MG: at 18:32

## 2017-11-02 RX ADMIN — CLOTRIMAZOLE AND BETAMETHASONE DIPROPIONATE SCH APPLICFUL: 10; .5 CREAM TOPICAL at 09:35

## 2017-11-02 RX ADMIN — Medication SCH MCG: at 06:10

## 2017-11-02 RX ADMIN — Medication SCH EACH: at 18:32

## 2017-11-02 RX ADMIN — Medication SCH UNIT: at 09:35

## 2017-11-02 RX ADMIN — Medication SCH MG: at 09:34

## 2017-11-02 RX ADMIN — Medication SCH MG: at 09:35

## 2017-11-03 RX ADMIN — Medication SCH EACH: at 18:10

## 2017-11-03 RX ADMIN — DEXTRAN 70, HYPROMELLOSE 2910 PRN EACH: 3; 1 SOLUTION/ DROPS OPHTHALMIC at 21:45

## 2017-11-03 RX ADMIN — Medication SCH MCG: at 07:32

## 2017-11-03 RX ADMIN — Medication PRN APPLICFUL: at 10:30

## 2017-11-03 RX ADMIN — Medication PRN APPLICFUL: at 04:17

## 2017-11-03 RX ADMIN — Medication SCH MG: at 18:10

## 2017-11-03 RX ADMIN — MAGNESIUM OXIDE TAB 400 MG (241.3 MG ELEMENTAL MG) SCH TAB: 400 (241.3 MG) TAB at 18:10

## 2017-11-03 RX ADMIN — Medication SCH UNIT: at 10:26

## 2017-11-03 RX ADMIN — Medication SCH MG: at 18:09

## 2017-11-03 RX ADMIN — Medication PRN APPLICFUL: at 21:45

## 2017-11-03 RX ADMIN — Medication PRN APPLIC: at 21:46

## 2017-11-03 RX ADMIN — CLOTRIMAZOLE AND BETAMETHASONE DIPROPIONATE SCH APPLICFUL: 10; .5 CREAM TOPICAL at 10:27

## 2017-11-03 RX ADMIN — Medication SCH MG: at 10:26

## 2017-11-03 RX ADMIN — Medication PRN APPLIC: at 04:18

## 2017-11-03 RX ADMIN — Medication SCH MG: at 10:25

## 2017-11-04 RX ADMIN — Medication SCH EACH: at 18:00

## 2017-11-04 RX ADMIN — Medication SCH MG: at 18:01

## 2017-11-04 RX ADMIN — Medication SCH MG: at 10:19

## 2017-11-04 RX ADMIN — CLOTRIMAZOLE AND BETAMETHASONE DIPROPIONATE SCH APPLICFUL: 10; .5 CREAM TOPICAL at 10:18

## 2017-11-04 RX ADMIN — Medication PRN APPLICFUL: at 10:20

## 2017-11-04 RX ADMIN — Medication SCH UNIT: at 10:19

## 2017-11-04 RX ADMIN — Medication SCH MG: at 18:00

## 2017-11-04 RX ADMIN — Medication SCH MG: at 10:18

## 2017-11-04 RX ADMIN — MAGNESIUM OXIDE TAB 400 MG (241.3 MG ELEMENTAL MG) SCH TAB: 400 (241.3 MG) TAB at 18:00

## 2017-11-05 RX ADMIN — DEXTRAN 70, HYPROMELLOSE 2910 PRN EACH: 3; 1 SOLUTION/ DROPS OPHTHALMIC at 18:57

## 2017-11-05 RX ADMIN — Medication PRN APPLICFUL: at 23:40

## 2017-11-05 RX ADMIN — Medication PRN APPLIC: at 06:23

## 2017-11-05 RX ADMIN — MAGNESIUM OXIDE TAB 400 MG (241.3 MG ELEMENTAL MG) SCH TAB: 400 (241.3 MG) TAB at 18:56

## 2017-11-05 RX ADMIN — Medication SCH EACH: at 18:56

## 2017-11-05 RX ADMIN — CLOTRIMAZOLE AND BETAMETHASONE DIPROPIONATE SCH APPLICFUL: 10; .5 CREAM TOPICAL at 09:48

## 2017-11-05 RX ADMIN — Medication SCH MG: at 18:55

## 2017-11-05 RX ADMIN — Medication SCH UNIT: at 09:47

## 2017-11-05 RX ADMIN — Medication PRN APPLICFUL: at 00:11

## 2017-11-05 RX ADMIN — Medication PRN APPLIC: at 23:40

## 2017-11-05 RX ADMIN — Medication SCH MCG: at 06:22

## 2017-11-05 RX ADMIN — Medication SCH MG: at 18:56

## 2017-11-05 RX ADMIN — Medication SCH EACH: at 23:39

## 2017-11-05 RX ADMIN — Medication PRN APPLIC: at 00:11

## 2017-11-05 RX ADMIN — Medication SCH MG: at 09:47

## 2017-11-05 RX ADMIN — Medication PRN APPLICFUL: at 09:49

## 2017-11-06 RX ADMIN — Medication SCH EACH: at 18:32

## 2017-11-06 RX ADMIN — CLOTRIMAZOLE AND BETAMETHASONE DIPROPIONATE SCH APPLICFUL: 10; .5 CREAM TOPICAL at 10:25

## 2017-11-06 RX ADMIN — Medication SCH MG: at 10:25

## 2017-11-06 RX ADMIN — Medication SCH MG: at 10:24

## 2017-11-06 RX ADMIN — Medication SCH UNIT: at 10:25

## 2017-11-06 RX ADMIN — Medication SCH MG: at 18:32

## 2017-11-06 RX ADMIN — Medication SCH MG: at 18:31

## 2017-11-06 RX ADMIN — Medication SCH MCG: at 07:41

## 2017-11-06 RX ADMIN — MAGNESIUM OXIDE TAB 400 MG (241.3 MG ELEMENTAL MG) SCH TAB: 400 (241.3 MG) TAB at 18:31

## 2017-11-06 NOTE — PN
Progress Note for JENIFER SHEETS  Date:  11/04/2017  Room #:

 

SUBJECTIVE:  This is an 88-year-old seen today for swing bed rounds.  She has

been having some more vaginal and venkatesh irritation.  She has been seen by the

gynecologist.  They have adjusted her cream.  She states it is much better now

but she still gets occasional discomfort.  Otherwise, she has no major concerns.

She has no chest pain.  No trouble breathing.

 

OBJECTIVE:  VITAL SIGNS:  Her temperature is 97.5, pulse 78, blood pressure

131/66, respiratory rate 16, O2 93 on room air.

GENERAL:  She is in no acute distress.

HEART:  Regularly irregular with murmur.

RESPIRATORY:  Lungs sounds are clear to auscultation bilaterally without

crackles or wheezes.

EXTREMITIES:  Warm and dry.  No edema.

MENTAL STATUS:  She is alert.  She is orientated x3.

 

LABORATORY DATA:  Lab work that she had on 10/31 was reviewed and did show her

to have stable hemoglobin at 12.9, white count 4.7, platelets 133.  Sodium 143,

potassium 4.3, chloride 105, bicarb 29, BUN 18, creatinine 0.8.  TSH 2.4,

calcium 9.3.

 

ASSESSMENT AND PLAN:

1. Atrial fibrillation, on Pradaxa.  For stroke prevention, continue with the

    same.

2. Impaired mobility due to osteoarthritis, severe in her knees.  She will

    continue with swing bed cares.

3. Essential hypertension, controlled.

4. Hypothyroidism, on treatment.

5. Chronic vulvar and vaginal irritation, on creams as directed by Gynecology.

The plan at this point is the patient will continue swing bed cares.  We will

probably not need to repeat any lab work until next October again unless she

develops problems in the interim.

 

 

MKA:  11/04/2017 13:17:29  MODL:  11/04/2017 13:40:03

Job #:  [id number]

## 2017-11-07 RX ADMIN — Medication SCH MCG: at 06:15

## 2017-11-07 RX ADMIN — MAGNESIUM OXIDE TAB 400 MG (241.3 MG ELEMENTAL MG) SCH TAB: 400 (241.3 MG) TAB at 18:24

## 2017-11-07 RX ADMIN — Medication SCH MG: at 18:23

## 2017-11-07 RX ADMIN — CLOTRIMAZOLE AND BETAMETHASONE DIPROPIONATE SCH APPLICFUL: 10; .5 CREAM TOPICAL at 10:29

## 2017-11-07 RX ADMIN — Medication SCH EACH: at 18:24

## 2017-11-07 RX ADMIN — Medication SCH MG: at 18:24

## 2017-11-07 RX ADMIN — Medication SCH MG: at 10:30

## 2017-11-07 RX ADMIN — Medication SCH UNIT: at 10:31

## 2017-11-08 RX ADMIN — Medication SCH UNIT: at 10:21

## 2017-11-08 RX ADMIN — Medication SCH MCG: at 06:15

## 2017-11-08 RX ADMIN — Medication SCH MG: at 10:23

## 2017-11-08 RX ADMIN — Medication SCH MG: at 18:23

## 2017-11-08 RX ADMIN — Medication SCH EACH: at 18:23

## 2017-11-08 RX ADMIN — CLOTRIMAZOLE AND BETAMETHASONE DIPROPIONATE SCH APPLICFUL: 10; .5 CREAM TOPICAL at 10:21

## 2017-11-08 RX ADMIN — Medication SCH MG: at 10:25

## 2017-11-08 RX ADMIN — MAGNESIUM OXIDE TAB 400 MG (241.3 MG ELEMENTAL MG) SCH TAB: 400 (241.3 MG) TAB at 18:22

## 2017-11-09 RX ADMIN — Medication SCH UNIT: at 10:25

## 2017-11-09 RX ADMIN — MAGNESIUM OXIDE TAB 400 MG (241.3 MG ELEMENTAL MG) SCH TAB: 400 (241.3 MG) TAB at 18:27

## 2017-11-09 RX ADMIN — Medication SCH EACH: at 18:27

## 2017-11-09 RX ADMIN — Medication SCH MG: at 18:28

## 2017-11-09 RX ADMIN — Medication SCH MG: at 10:25

## 2017-11-09 RX ADMIN — CLOTRIMAZOLE AND BETAMETHASONE DIPROPIONATE SCH APPLICFUL: 10; .5 CREAM TOPICAL at 10:26

## 2017-11-09 RX ADMIN — Medication PRN APPLIC: at 22:55

## 2017-11-09 RX ADMIN — Medication SCH MCG: at 06:08

## 2017-11-09 RX ADMIN — Medication SCH MG: at 10:24

## 2017-11-09 RX ADMIN — Medication SCH MG: at 18:27

## 2017-11-10 RX ADMIN — Medication SCH EACH: at 18:20

## 2017-11-10 RX ADMIN — Medication SCH UNIT: at 09:41

## 2017-11-10 RX ADMIN — CLOTRIMAZOLE AND BETAMETHASONE DIPROPIONATE SCH APPLICFUL: 10; .5 CREAM TOPICAL at 09:42

## 2017-11-10 RX ADMIN — Medication SCH MG: at 18:20

## 2017-11-10 RX ADMIN — Medication SCH MCG: at 06:29

## 2017-11-10 RX ADMIN — MAGNESIUM OXIDE TAB 400 MG (241.3 MG ELEMENTAL MG) SCH TAB: 400 (241.3 MG) TAB at 18:20

## 2017-11-10 RX ADMIN — Medication SCH MG: at 09:40

## 2017-11-11 RX ADMIN — Medication SCH MG: at 18:21

## 2017-11-11 RX ADMIN — Medication SCH MG: at 18:20

## 2017-11-11 RX ADMIN — CLOTRIMAZOLE AND BETAMETHASONE DIPROPIONATE SCH APPLICFUL: 10; .5 CREAM TOPICAL at 09:49

## 2017-11-11 RX ADMIN — Medication SCH EACH: at 18:20

## 2017-11-11 RX ADMIN — Medication SCH MG: at 09:49

## 2017-11-11 RX ADMIN — MAGNESIUM OXIDE TAB 400 MG (241.3 MG ELEMENTAL MG) SCH TAB: 400 (241.3 MG) TAB at 18:20

## 2017-11-11 RX ADMIN — Medication SCH UNIT: at 09:49

## 2017-11-11 RX ADMIN — Medication SCH MG: at 09:48

## 2017-11-12 RX ADMIN — Medication SCH MG: at 09:34

## 2017-11-12 RX ADMIN — Medication SCH MCG: at 06:19

## 2017-11-12 RX ADMIN — Medication SCH MG: at 18:29

## 2017-11-12 RX ADMIN — Medication SCH MG: at 09:35

## 2017-11-12 RX ADMIN — Medication SCH EACH: at 18:29

## 2017-11-12 RX ADMIN — CLOTRIMAZOLE AND BETAMETHASONE DIPROPIONATE SCH APPLICFUL: 10; .5 CREAM TOPICAL at 09:36

## 2017-11-12 RX ADMIN — MAGNESIUM OXIDE TAB 400 MG (241.3 MG ELEMENTAL MG) SCH TAB: 400 (241.3 MG) TAB at 18:29

## 2017-11-12 RX ADMIN — Medication PRN APPLIC: at 23:23

## 2017-11-12 RX ADMIN — Medication SCH UNIT: at 09:35

## 2017-11-12 RX ADMIN — Medication SCH EACH: at 23:08

## 2017-11-13 RX ADMIN — Medication SCH MG: at 10:46

## 2017-11-13 RX ADMIN — Medication SCH EACH: at 18:02

## 2017-11-13 RX ADMIN — Medication SCH MG: at 18:03

## 2017-11-13 RX ADMIN — CLOTRIMAZOLE AND BETAMETHASONE DIPROPIONATE SCH APPLICFUL: 10; .5 CREAM TOPICAL at 10:45

## 2017-11-13 RX ADMIN — Medication SCH MCG: at 06:06

## 2017-11-13 RX ADMIN — Medication SCH UNIT: at 10:46

## 2017-11-13 RX ADMIN — Medication PRN APPLICFUL: at 10:47

## 2017-11-13 RX ADMIN — Medication SCH MG: at 10:45

## 2017-11-13 RX ADMIN — MAGNESIUM OXIDE TAB 400 MG (241.3 MG ELEMENTAL MG) SCH TAB: 400 (241.3 MG) TAB at 18:03

## 2017-11-14 RX ADMIN — Medication SCH EACH: at 18:17

## 2017-11-14 RX ADMIN — Medication SCH MG: at 09:22

## 2017-11-14 RX ADMIN — CLOTRIMAZOLE AND BETAMETHASONE DIPROPIONATE SCH APPLICFUL: 10; .5 CREAM TOPICAL at 09:23

## 2017-11-14 RX ADMIN — Medication SCH UNIT: at 09:22

## 2017-11-14 RX ADMIN — DEXTRAN 70, HYPROMELLOSE 2910 PRN EACH: 3; 1 SOLUTION/ DROPS OPHTHALMIC at 18:17

## 2017-11-14 RX ADMIN — Medication SCH MCG: at 06:01

## 2017-11-14 RX ADMIN — MAGNESIUM OXIDE TAB 400 MG (241.3 MG ELEMENTAL MG) SCH TAB: 400 (241.3 MG) TAB at 18:17

## 2017-11-14 RX ADMIN — Medication SCH MG: at 18:17

## 2017-11-15 RX ADMIN — Medication SCH MG: at 09:47

## 2017-11-15 RX ADMIN — Medication SCH EACH: at 18:06

## 2017-11-15 RX ADMIN — MAGNESIUM OXIDE TAB 400 MG (241.3 MG ELEMENTAL MG) SCH TAB: 400 (241.3 MG) TAB at 18:06

## 2017-11-15 RX ADMIN — Medication SCH MCG: at 06:17

## 2017-11-15 RX ADMIN — Medication SCH MG: at 18:06

## 2017-11-15 RX ADMIN — Medication SCH MG: at 09:46

## 2017-11-15 RX ADMIN — Medication SCH UNIT: at 09:46

## 2017-11-16 RX ADMIN — Medication SCH UNIT: at 11:05

## 2017-11-16 RX ADMIN — Medication SCH EACH: at 18:32

## 2017-11-16 RX ADMIN — MAGNESIUM OXIDE TAB 400 MG (241.3 MG ELEMENTAL MG) SCH TAB: 400 (241.3 MG) TAB at 18:32

## 2017-11-16 RX ADMIN — Medication SCH MG: at 11:05

## 2017-11-16 RX ADMIN — Medication SCH MCG: at 06:12

## 2017-11-16 RX ADMIN — Medication SCH MG: at 18:32

## 2017-11-16 RX ADMIN — Medication SCH MG: at 18:31

## 2017-11-17 RX ADMIN — Medication SCH UNIT: at 10:48

## 2017-11-17 RX ADMIN — Medication SCH MG: at 10:48

## 2017-11-17 RX ADMIN — Medication SCH MCG: at 06:11

## 2017-11-17 RX ADMIN — MAGNESIUM OXIDE TAB 400 MG (241.3 MG ELEMENTAL MG) SCH TAB: 400 (241.3 MG) TAB at 17:59

## 2017-11-17 RX ADMIN — Medication SCH EACH: at 17:59

## 2017-11-17 RX ADMIN — Medication SCH MG: at 17:59

## 2017-11-17 RX ADMIN — DEXTRAN 70, HYPROMELLOSE 2910 PRN EACH: 3; 1 SOLUTION/ DROPS OPHTHALMIC at 10:51

## 2017-11-18 RX ADMIN — Medication SCH MG: at 18:00

## 2017-11-18 RX ADMIN — Medication PRN APPLICFUL: at 09:56

## 2017-11-18 RX ADMIN — Medication SCH MG: at 09:55

## 2017-11-18 RX ADMIN — DEXTRAN 70, HYPROMELLOSE 2910 PRN EACH: 3; 1 SOLUTION/ DROPS OPHTHALMIC at 09:57

## 2017-11-18 RX ADMIN — Medication SCH EACH: at 18:01

## 2017-11-18 RX ADMIN — Medication SCH UNIT: at 09:55

## 2017-11-18 RX ADMIN — MAGNESIUM OXIDE TAB 400 MG (241.3 MG ELEMENTAL MG) SCH TAB: 400 (241.3 MG) TAB at 18:00

## 2017-11-19 RX ADMIN — Medication SCH MG: at 09:21

## 2017-11-19 RX ADMIN — Medication SCH UNIT: at 09:21

## 2017-11-19 RX ADMIN — Medication SCH EACH: at 17:59

## 2017-11-19 RX ADMIN — DEXTRAN 70, HYPROMELLOSE 2910 PRN EACH: 3; 1 SOLUTION/ DROPS OPHTHALMIC at 09:23

## 2017-11-19 RX ADMIN — Medication PRN APPLICFUL: at 09:23

## 2017-11-19 RX ADMIN — Medication SCH MG: at 17:59

## 2017-11-19 RX ADMIN — Medication SCH MCG: at 06:11

## 2017-11-19 RX ADMIN — MAGNESIUM OXIDE TAB 400 MG (241.3 MG ELEMENTAL MG) SCH TAB: 400 (241.3 MG) TAB at 17:59

## 2017-11-20 RX ADMIN — MAGNESIUM OXIDE TAB 400 MG (241.3 MG ELEMENTAL MG) SCH TAB: 400 (241.3 MG) TAB at 18:22

## 2017-11-20 RX ADMIN — Medication SCH MG: at 09:38

## 2017-11-20 RX ADMIN — Medication SCH MCG: at 08:40

## 2017-11-20 RX ADMIN — Medication PRN APPLIC: at 00:37

## 2017-11-20 RX ADMIN — Medication SCH MG: at 18:21

## 2017-11-20 RX ADMIN — Medication SCH MG: at 09:39

## 2017-11-20 RX ADMIN — Medication SCH EACH: at 00:33

## 2017-11-20 RX ADMIN — Medication SCH EACH: at 18:22

## 2017-11-20 RX ADMIN — Medication SCH UNIT: at 09:39

## 2017-11-20 RX ADMIN — Medication PRN APPLICFUL: at 00:35

## 2017-11-21 RX ADMIN — Medication SCH EACH: at 18:44

## 2017-11-21 RX ADMIN — Medication SCH MG: at 09:59

## 2017-11-21 RX ADMIN — MAGNESIUM OXIDE TAB 400 MG (241.3 MG ELEMENTAL MG) SCH TAB: 400 (241.3 MG) TAB at 18:43

## 2017-11-21 RX ADMIN — Medication SCH MCG: at 06:34

## 2017-11-21 RX ADMIN — Medication SCH MG: at 09:57

## 2017-11-21 RX ADMIN — Medication SCH MG: at 18:43

## 2017-11-21 RX ADMIN — Medication SCH UNIT: at 09:59

## 2017-11-22 RX ADMIN — Medication SCH MG: at 10:53

## 2017-11-22 RX ADMIN — Medication SCH MG: at 20:22

## 2017-11-22 RX ADMIN — Medication SCH MG: at 20:21

## 2017-11-22 RX ADMIN — MAGNESIUM OXIDE TAB 400 MG (241.3 MG ELEMENTAL MG) SCH TAB: 400 (241.3 MG) TAB at 20:22

## 2017-11-22 RX ADMIN — Medication SCH MCG: at 06:29

## 2017-11-22 RX ADMIN — Medication SCH UNIT: at 10:52

## 2017-11-22 RX ADMIN — Medication SCH MG: at 10:54

## 2017-11-22 RX ADMIN — Medication SCH EACH: at 20:23

## 2017-11-23 RX ADMIN — Medication PRN APPLICFUL: at 09:35

## 2017-11-23 RX ADMIN — Medication SCH MG: at 17:59

## 2017-11-23 RX ADMIN — MAGNESIUM OXIDE TAB 400 MG (241.3 MG ELEMENTAL MG) SCH TAB: 400 (241.3 MG) TAB at 17:59

## 2017-11-23 RX ADMIN — Medication SCH EACH: at 17:59

## 2017-11-23 RX ADMIN — Medication SCH UNIT: at 09:32

## 2017-11-23 RX ADMIN — Medication SCH MCG: at 06:07

## 2017-11-23 RX ADMIN — Medication SCH MG: at 09:32

## 2017-11-24 RX ADMIN — ANORECTAL OINTMENT PRN APPLIC: 15.7; .44; 24; 20.6 OINTMENT TOPICAL at 23:41

## 2017-11-24 RX ADMIN — DEXTRAN 70, HYPROMELLOSE 2910 PRN EACH: 3; 1 SOLUTION/ DROPS OPHTHALMIC at 11:15

## 2017-11-24 RX ADMIN — Medication PRN APPLIC: at 23:40

## 2017-11-24 RX ADMIN — Medication SCH MG: at 11:10

## 2017-11-24 RX ADMIN — Medication SCH MG: at 18:57

## 2017-11-24 RX ADMIN — Medication SCH EACH: at 18:57

## 2017-11-24 RX ADMIN — Medication SCH MCG: at 08:31

## 2017-11-24 RX ADMIN — Medication SCH MG: at 18:58

## 2017-11-24 RX ADMIN — MAGNESIUM OXIDE TAB 400 MG (241.3 MG ELEMENTAL MG) SCH TAB: 400 (241.3 MG) TAB at 18:58

## 2017-11-24 RX ADMIN — Medication PRN APPLICFUL: at 23:40

## 2017-11-24 RX ADMIN — Medication SCH MG: at 11:11

## 2017-11-24 RX ADMIN — Medication SCH UNIT: at 11:10

## 2017-11-25 RX ADMIN — Medication SCH MG: at 18:30

## 2017-11-25 RX ADMIN — Medication PRN APPLIC: at 23:37

## 2017-11-25 RX ADMIN — Medication SCH EACH: at 18:29

## 2017-11-25 RX ADMIN — Medication SCH MG: at 10:36

## 2017-11-25 RX ADMIN — DEXTRAN 70, HYPROMELLOSE 2910 PRN EACH: 3; 1 SOLUTION/ DROPS OPHTHALMIC at 18:34

## 2017-11-25 RX ADMIN — Medication SCH UNIT: at 10:35

## 2017-11-25 RX ADMIN — Medication PRN APPLICFUL: at 23:37

## 2017-11-25 RX ADMIN — Medication SCH MG: at 18:29

## 2017-11-25 RX ADMIN — ANORECTAL OINTMENT PRN APPLIC: 15.7; .44; 24; 20.6 OINTMENT TOPICAL at 23:38

## 2017-11-25 RX ADMIN — MAGNESIUM OXIDE TAB 400 MG (241.3 MG ELEMENTAL MG) SCH TAB: 400 (241.3 MG) TAB at 18:29

## 2017-11-26 RX ADMIN — Medication PRN APPLICFUL: at 23:50

## 2017-11-26 RX ADMIN — Medication SCH UNIT: at 10:37

## 2017-11-26 RX ADMIN — Medication SCH MG: at 19:26

## 2017-11-26 RX ADMIN — Medication PRN APPLIC: at 23:49

## 2017-11-26 RX ADMIN — Medication SCH MG: at 10:38

## 2017-11-26 RX ADMIN — DEXTRAN 70, HYPROMELLOSE 2910 PRN EACH: 3; 1 SOLUTION/ DROPS OPHTHALMIC at 10:40

## 2017-11-26 RX ADMIN — Medication SCH EACH: at 23:49

## 2017-11-26 RX ADMIN — MAGNESIUM OXIDE TAB 400 MG (241.3 MG ELEMENTAL MG) SCH TAB: 400 (241.3 MG) TAB at 19:27

## 2017-11-26 RX ADMIN — Medication SCH MG: at 10:39

## 2017-11-26 RX ADMIN — Medication SCH MG: at 19:27

## 2017-11-26 RX ADMIN — Medication SCH EACH: at 19:26

## 2017-11-26 RX ADMIN — ANORECTAL OINTMENT PRN APPLIC: 15.7; .44; 24; 20.6 OINTMENT TOPICAL at 23:50

## 2017-11-26 RX ADMIN — Medication SCH MCG: at 06:07

## 2017-11-27 RX ADMIN — Medication SCH EACH: at 18:25

## 2017-11-27 RX ADMIN — Medication SCH UNIT: at 09:49

## 2017-11-27 RX ADMIN — Medication SCH MG: at 18:24

## 2017-11-27 RX ADMIN — ANORECTAL OINTMENT PRN APPLIC: 15.7; .44; 24; 20.6 OINTMENT TOPICAL at 21:07

## 2017-11-27 RX ADMIN — Medication SCH MCG: at 06:13

## 2017-11-27 RX ADMIN — MAGNESIUM OXIDE TAB 400 MG (241.3 MG ELEMENTAL MG) SCH TAB: 400 (241.3 MG) TAB at 18:25

## 2017-11-27 RX ADMIN — Medication PRN APPLICFUL: at 09:52

## 2017-11-27 RX ADMIN — Medication SCH MG: at 18:25

## 2017-11-27 RX ADMIN — Medication SCH MG: at 09:49

## 2017-11-27 RX ADMIN — Medication SCH MG: at 09:47

## 2017-11-27 RX ADMIN — Medication PRN APPLICFUL: at 21:06

## 2017-11-28 RX ADMIN — Medication SCH MG: at 18:20

## 2017-11-28 RX ADMIN — Medication SCH UNIT: at 10:08

## 2017-11-28 RX ADMIN — Medication SCH MCG: at 08:20

## 2017-11-28 RX ADMIN — Medication SCH MG: at 10:08

## 2017-11-28 RX ADMIN — MAGNESIUM OXIDE TAB 400 MG (241.3 MG ELEMENTAL MG) SCH TAB: 400 (241.3 MG) TAB at 18:20

## 2017-11-28 RX ADMIN — Medication SCH EACH: at 18:20

## 2017-11-29 RX ADMIN — Medication SCH MG: at 18:18

## 2017-11-29 RX ADMIN — Medication SCH MG: at 10:29

## 2017-11-29 RX ADMIN — Medication SCH UNIT: at 10:30

## 2017-11-29 RX ADMIN — MAGNESIUM OXIDE TAB 400 MG (241.3 MG ELEMENTAL MG) SCH TAB: 400 (241.3 MG) TAB at 18:17

## 2017-11-29 RX ADMIN — Medication SCH MCG: at 06:31

## 2017-11-29 RX ADMIN — Medication SCH MG: at 10:30

## 2017-11-29 RX ADMIN — DEXTRAN 70, HYPROMELLOSE 2910 PRN EACH: 3; 1 SOLUTION/ DROPS OPHTHALMIC at 10:30

## 2017-11-29 RX ADMIN — Medication SCH EACH: at 18:17

## 2017-11-30 RX ADMIN — Medication SCH UNIT: at 10:22

## 2017-11-30 RX ADMIN — MAGNESIUM OXIDE TAB 400 MG (241.3 MG ELEMENTAL MG) SCH TAB: 400 (241.3 MG) TAB at 17:59

## 2017-11-30 RX ADMIN — Medication SCH EACH: at 17:59

## 2017-11-30 RX ADMIN — Medication SCH MG: at 17:59

## 2017-11-30 RX ADMIN — Medication SCH MG: at 10:22

## 2017-11-30 RX ADMIN — Medication PRN APPLICFUL: at 10:23

## 2017-11-30 RX ADMIN — Medication SCH MCG: at 06:34

## 2017-12-01 RX ADMIN — Medication SCH MCG: at 06:50

## 2017-12-01 RX ADMIN — Medication SCH UNIT: at 10:09

## 2017-12-01 RX ADMIN — Medication SCH MG: at 18:21

## 2017-12-01 RX ADMIN — DEXTRAN 70, HYPROMELLOSE 2910 PRN EACH: 3; 1 SOLUTION/ DROPS OPHTHALMIC at 10:12

## 2017-12-01 RX ADMIN — Medication SCH MG: at 10:10

## 2017-12-01 RX ADMIN — Medication SCH EACH: at 18:21

## 2017-12-01 RX ADMIN — MAGNESIUM OXIDE TAB 400 MG (241.3 MG ELEMENTAL MG) SCH TAB: 400 (241.3 MG) TAB at 18:21

## 2017-12-01 RX ADMIN — Medication SCH MG: at 10:09

## 2017-12-01 NOTE — PN
Progress Note for JENIFER SHEETS  Date:  11/30/2017  Room #:

 

SUBJECTIVE:  This is a 30 day followup visit on an 88-year-old on swing bed for

continued care with ADLs.  She was last seen on 11/04, after we reviewed her lab

work.  She has been feeling well.  She is not having as much vaginal irritation.

She is not having any chest pain, no shortness of breath.

 

OBJECTIVE:  VITAL SIGNS:  Her temperature is 97.5, pulse 57, respiratory rate

16, O2 94 on room air, blood pressure 139/77.

GENERAL:  She is in no acute distress.

HEART:  Regularly irregular with murmur.

LUNGS:  Sounds are clear to auscultation bilaterally without crackles or

wheezes.

EXTREMITIES:  Warm and dry.  No edema.

MENTAL STATUS:  She is alert.  She is orientated x3.

 

ASSESSMENT:

1. Atrial fibrillation on Pradaxa for stroke prevention.

2. Impaired mobility due to severe osteoarthritis in the knees.

3. Essential hypertension, controlled.

4. Hypothyroidism, treated.

5. Chronic vulvar and vaginal irritation, improved.

 

PLAN:  At this point, we will continue the same cares, see her for reassessment

in 30 days or sooner if needed.

 

 

MKA:  11/30/2017 17:58:45  MODL:  11/30/2017 18:24:01

Job #:  [id number]

## 2017-12-02 RX ADMIN — Medication SCH MG: at 10:19

## 2017-12-02 RX ADMIN — Medication SCH EACH: at 18:03

## 2017-12-02 RX ADMIN — Medication SCH MG: at 18:02

## 2017-12-02 RX ADMIN — Medication SCH UNIT: at 10:19

## 2017-12-02 RX ADMIN — ANORECTAL OINTMENT PRN APPLIC: 15.7; .44; 24; 20.6 OINTMENT TOPICAL at 23:47

## 2017-12-02 RX ADMIN — Medication PRN APPLICFUL: at 23:47

## 2017-12-02 RX ADMIN — MAGNESIUM OXIDE TAB 400 MG (241.3 MG ELEMENTAL MG) SCH TAB: 400 (241.3 MG) TAB at 18:03

## 2017-12-03 RX ADMIN — Medication SCH MG: at 18:46

## 2017-12-03 RX ADMIN — Medication SCH EACH: at 18:46

## 2017-12-03 RX ADMIN — MAGNESIUM OXIDE TAB 400 MG (241.3 MG ELEMENTAL MG) SCH TAB: 400 (241.3 MG) TAB at 18:47

## 2017-12-03 RX ADMIN — Medication SCH MG: at 10:02

## 2017-12-03 RX ADMIN — Medication SCH MG: at 18:45

## 2017-12-03 RX ADMIN — ANORECTAL OINTMENT PRN APPLIC: 15.7; .44; 24; 20.6 OINTMENT TOPICAL at 23:24

## 2017-12-03 RX ADMIN — Medication PRN APPLICFUL: at 23:23

## 2017-12-03 RX ADMIN — Medication SCH UNIT: at 09:59

## 2017-12-03 RX ADMIN — DEXTRAN 70, HYPROMELLOSE 2910 PRN EACH: 3; 1 SOLUTION/ DROPS OPHTHALMIC at 18:47

## 2017-12-03 RX ADMIN — Medication SCH MG: at 10:00

## 2017-12-03 RX ADMIN — Medication SCH EACH: at 23:23

## 2017-12-03 RX ADMIN — Medication SCH MCG: at 06:01

## 2017-12-04 RX ADMIN — Medication PRN APPLICFUL: at 23:26

## 2017-12-04 RX ADMIN — MAGNESIUM OXIDE TAB 400 MG (241.3 MG ELEMENTAL MG) SCH TAB: 400 (241.3 MG) TAB at 18:01

## 2017-12-04 RX ADMIN — ANORECTAL OINTMENT PRN APPLIC: 15.7; .44; 24; 20.6 OINTMENT TOPICAL at 23:28

## 2017-12-04 RX ADMIN — Medication SCH UNIT: at 09:51

## 2017-12-04 RX ADMIN — Medication SCH EACH: at 18:02

## 2017-12-04 RX ADMIN — Medication SCH MG: at 09:52

## 2017-12-04 RX ADMIN — Medication SCH MG: at 18:02

## 2017-12-04 RX ADMIN — Medication SCH MCG: at 06:05

## 2017-12-04 RX ADMIN — Medication SCH MG: at 09:51

## 2017-12-05 RX ADMIN — Medication SCH MG: at 10:24

## 2017-12-05 RX ADMIN — Medication SCH MG: at 10:25

## 2017-12-05 RX ADMIN — Medication SCH MCG: at 06:17

## 2017-12-05 RX ADMIN — MAGNESIUM OXIDE TAB 400 MG (241.3 MG ELEMENTAL MG) SCH TAB: 400 (241.3 MG) TAB at 18:02

## 2017-12-05 RX ADMIN — Medication SCH EACH: at 18:02

## 2017-12-05 RX ADMIN — Medication SCH MG: at 18:01

## 2017-12-05 RX ADMIN — Medication SCH MG: at 18:02

## 2017-12-05 RX ADMIN — Medication SCH UNIT: at 10:25

## 2017-12-06 RX ADMIN — Medication SCH MG: at 18:42

## 2017-12-06 RX ADMIN — Medication SCH MG: at 10:12

## 2017-12-06 RX ADMIN — Medication SCH MG: at 10:11

## 2017-12-06 RX ADMIN — DEXTRAN 70, HYPROMELLOSE 2910 PRN EACH: 3; 1 SOLUTION/ DROPS OPHTHALMIC at 10:15

## 2017-12-06 RX ADMIN — Medication SCH MCG: at 06:26

## 2017-12-06 RX ADMIN — Medication SCH MG: at 18:43

## 2017-12-06 RX ADMIN — MAGNESIUM OXIDE TAB 400 MG (241.3 MG ELEMENTAL MG) SCH TAB: 400 (241.3 MG) TAB at 18:43

## 2017-12-06 RX ADMIN — Medication SCH UNIT: at 10:11

## 2017-12-06 RX ADMIN — Medication SCH EACH: at 18:43

## 2017-12-07 RX ADMIN — Medication SCH MCG: at 06:53

## 2017-12-07 RX ADMIN — Medication SCH UNIT: at 09:47

## 2017-12-07 RX ADMIN — MAGNESIUM OXIDE TAB 400 MG (241.3 MG ELEMENTAL MG) SCH TAB: 400 (241.3 MG) TAB at 18:38

## 2017-12-07 RX ADMIN — Medication SCH MG: at 09:47

## 2017-12-07 RX ADMIN — Medication SCH EACH: at 18:39

## 2017-12-07 RX ADMIN — Medication SCH MG: at 18:39

## 2017-12-08 RX ADMIN — Medication SCH MG: at 18:05

## 2017-12-08 RX ADMIN — Medication SCH MCG: at 06:40

## 2017-12-08 RX ADMIN — Medication SCH MG: at 10:53

## 2017-12-08 RX ADMIN — DEXTRAN 70, HYPROMELLOSE 2910 PRN EACH: 3; 1 SOLUTION/ DROPS OPHTHALMIC at 10:54

## 2017-12-08 RX ADMIN — Medication SCH EACH: at 18:05

## 2017-12-08 RX ADMIN — Medication SCH UNIT: at 10:53

## 2017-12-08 RX ADMIN — Medication SCH MG: at 10:52

## 2017-12-08 RX ADMIN — MAGNESIUM OXIDE TAB 400 MG (241.3 MG ELEMENTAL MG) SCH TAB: 400 (241.3 MG) TAB at 18:05

## 2017-12-09 RX ADMIN — Medication SCH MG: at 18:05

## 2017-12-09 RX ADMIN — Medication SCH UNIT: at 10:33

## 2017-12-09 RX ADMIN — Medication PRN APPLICFUL: at 10:35

## 2017-12-09 RX ADMIN — MAGNESIUM OXIDE TAB 400 MG (241.3 MG ELEMENTAL MG) SCH TAB: 400 (241.3 MG) TAB at 18:05

## 2017-12-09 RX ADMIN — Medication SCH MG: at 10:33

## 2017-12-09 RX ADMIN — Medication SCH EACH: at 18:05

## 2017-12-09 RX ADMIN — DEXTRAN 70, HYPROMELLOSE 2910 PRN EACH: 3; 1 SOLUTION/ DROPS OPHTHALMIC at 10:34

## 2017-12-09 RX ADMIN — Medication SCH MG: at 10:32

## 2017-12-09 RX ADMIN — Medication SCH MG: at 18:06

## 2017-12-10 RX ADMIN — Medication SCH UNIT: at 10:02

## 2017-12-10 RX ADMIN — ANORECTAL OINTMENT PRN APPLIC: 15.7; .44; 24; 20.6 OINTMENT TOPICAL at 23:21

## 2017-12-10 RX ADMIN — Medication SCH MG: at 18:10

## 2017-12-10 RX ADMIN — MAGNESIUM OXIDE TAB 400 MG (241.3 MG ELEMENTAL MG) SCH TAB: 400 (241.3 MG) TAB at 18:12

## 2017-12-10 RX ADMIN — DEXTRAN 70, HYPROMELLOSE 2910 PRN EACH: 3; 1 SOLUTION/ DROPS OPHTHALMIC at 10:04

## 2017-12-10 RX ADMIN — Medication SCH MG: at 10:02

## 2017-12-10 RX ADMIN — Medication SCH MCG: at 06:30

## 2017-12-10 RX ADMIN — Medication PRN APPLICFUL: at 10:03

## 2017-12-10 RX ADMIN — Medication SCH EACH: at 18:11

## 2017-12-10 RX ADMIN — ANORECTAL OINTMENT PRN APPLIC: 15.7; .44; 24; 20.6 OINTMENT TOPICAL at 00:09

## 2017-12-10 RX ADMIN — Medication SCH EACH: at 23:20

## 2017-12-10 RX ADMIN — Medication PRN APPLICFUL: at 00:09

## 2017-12-10 RX ADMIN — Medication PRN APPLICFUL: at 23:21

## 2017-12-10 RX ADMIN — Medication SCH MG: at 18:11

## 2017-12-11 RX ADMIN — Medication SCH MG: at 18:58

## 2017-12-11 RX ADMIN — Medication SCH EACH: at 18:58

## 2017-12-11 RX ADMIN — Medication SCH MG: at 18:57

## 2017-12-11 RX ADMIN — Medication SCH MG: at 10:06

## 2017-12-11 RX ADMIN — Medication SCH MCG: at 06:21

## 2017-12-11 RX ADMIN — Medication SCH UNIT: at 10:07

## 2017-12-11 RX ADMIN — MAGNESIUM OXIDE TAB 400 MG (241.3 MG ELEMENTAL MG) SCH TAB: 400 (241.3 MG) TAB at 18:57

## 2017-12-11 RX ADMIN — Medication SCH MG: at 10:07

## 2017-12-12 RX ADMIN — Medication SCH MCG: at 06:35

## 2017-12-12 RX ADMIN — Medication PRN APPLICFUL: at 23:32

## 2017-12-12 RX ADMIN — Medication PRN APPLICFUL: at 10:22

## 2017-12-12 RX ADMIN — MAGNESIUM OXIDE TAB 400 MG (241.3 MG ELEMENTAL MG) SCH TAB: 400 (241.3 MG) TAB at 18:01

## 2017-12-12 RX ADMIN — Medication SCH MG: at 18:01

## 2017-12-12 RX ADMIN — Medication SCH MG: at 10:21

## 2017-12-12 RX ADMIN — ANORECTAL OINTMENT PRN APPLIC: 15.7; .44; 24; 20.6 OINTMENT TOPICAL at 23:32

## 2017-12-12 RX ADMIN — DEXTRAN 70, HYPROMELLOSE 2910 PRN EACH: 3; 1 SOLUTION/ DROPS OPHTHALMIC at 10:22

## 2017-12-12 RX ADMIN — Medication SCH EACH: at 18:01

## 2017-12-12 RX ADMIN — Medication SCH MG: at 10:20

## 2017-12-12 RX ADMIN — Medication SCH UNIT: at 10:21

## 2017-12-13 RX ADMIN — MAGNESIUM OXIDE TAB 400 MG (241.3 MG ELEMENTAL MG) SCH TAB: 400 (241.3 MG) TAB at 17:29

## 2017-12-13 RX ADMIN — Medication SCH MG: at 09:59

## 2017-12-13 RX ADMIN — Medication SCH MG: at 17:29

## 2017-12-13 RX ADMIN — Medication SCH MG: at 09:58

## 2017-12-13 RX ADMIN — MAGNESIUM OXIDE TAB 400 MG (241.3 MG ELEMENTAL MG) SCH: 400 (241.3 MG) TAB at 18:41

## 2017-12-13 RX ADMIN — Medication SCH MG: at 17:30

## 2017-12-13 RX ADMIN — Medication SCH: at 18:41

## 2017-12-13 RX ADMIN — Medication SCH EACH: at 17:28

## 2017-12-13 RX ADMIN — Medication SCH MCG: at 06:56

## 2017-12-13 RX ADMIN — Medication SCH UNIT: at 09:57

## 2017-12-14 RX ADMIN — Medication SCH MG: at 10:09

## 2017-12-14 RX ADMIN — Medication SCH UNIT: at 10:09

## 2017-12-14 RX ADMIN — Medication SCH MG: at 18:08

## 2017-12-14 RX ADMIN — Medication SCH MG: at 18:09

## 2017-12-14 RX ADMIN — Medication SCH EACH: at 18:09

## 2017-12-14 RX ADMIN — Medication SCH MCG: at 06:43

## 2017-12-14 RX ADMIN — Medication SCH MG: at 10:08

## 2017-12-14 RX ADMIN — MAGNESIUM OXIDE TAB 400 MG (241.3 MG ELEMENTAL MG) SCH TAB: 400 (241.3 MG) TAB at 18:08

## 2017-12-15 RX ADMIN — Medication SCH EACH: at 18:06

## 2017-12-15 RX ADMIN — Medication SCH MCG: at 06:03

## 2017-12-15 RX ADMIN — Medication SCH MG: at 18:05

## 2017-12-15 RX ADMIN — Medication SCH UNIT: at 10:34

## 2017-12-15 RX ADMIN — Medication SCH MG: at 18:06

## 2017-12-15 RX ADMIN — MAGNESIUM OXIDE TAB 400 MG (241.3 MG ELEMENTAL MG) SCH TAB: 400 (241.3 MG) TAB at 18:06

## 2017-12-15 RX ADMIN — DEXTRAN 70, HYPROMELLOSE 2910 PRN EACH: 3; 1 SOLUTION/ DROPS OPHTHALMIC at 10:36

## 2017-12-15 RX ADMIN — Medication SCH MG: at 10:35

## 2017-12-15 RX ADMIN — Medication SCH MG: at 10:34

## 2017-12-16 RX ADMIN — Medication SCH UNIT: at 10:27

## 2017-12-16 RX ADMIN — MAGNESIUM OXIDE TAB 400 MG (241.3 MG ELEMENTAL MG) SCH TAB: 400 (241.3 MG) TAB at 19:12

## 2017-12-16 RX ADMIN — ANORECTAL OINTMENT PRN APPLIC: 15.7; .44; 24; 20.6 OINTMENT TOPICAL at 23:27

## 2017-12-16 RX ADMIN — ANORECTAL OINTMENT PRN APPLIC: 15.7; .44; 24; 20.6 OINTMENT TOPICAL at 00:16

## 2017-12-16 RX ADMIN — Medication PRN APPLICFUL: at 23:27

## 2017-12-16 RX ADMIN — Medication SCH EACH: at 19:12

## 2017-12-16 RX ADMIN — Medication PRN APPLICFUL: at 00:15

## 2017-12-16 RX ADMIN — Medication SCH MG: at 19:12

## 2017-12-16 RX ADMIN — Medication SCH MG: at 10:27

## 2017-12-17 RX ADMIN — Medication SCH UNIT: at 10:38

## 2017-12-17 RX ADMIN — Medication SCH MG: at 19:32

## 2017-12-17 RX ADMIN — MAGNESIUM OXIDE TAB 400 MG (241.3 MG ELEMENTAL MG) SCH TAB: 400 (241.3 MG) TAB at 19:32

## 2017-12-17 RX ADMIN — Medication PRN APPLICFUL: at 23:47

## 2017-12-17 RX ADMIN — Medication SCH EACH: at 23:47

## 2017-12-17 RX ADMIN — Medication SCH MG: at 10:39

## 2017-12-17 RX ADMIN — Medication SCH MG: at 10:38

## 2017-12-17 RX ADMIN — Medication SCH EACH: at 19:32

## 2017-12-17 RX ADMIN — Medication SCH MCG: at 06:09

## 2017-12-17 RX ADMIN — ANORECTAL OINTMENT PRN APPLIC: 15.7; .44; 24; 20.6 OINTMENT TOPICAL at 23:47

## 2017-12-18 RX ADMIN — Medication SCH EACH: at 19:02

## 2017-12-18 RX ADMIN — Medication SCH MG: at 19:02

## 2017-12-18 RX ADMIN — Medication SCH MCG: at 06:46

## 2017-12-18 RX ADMIN — MAGNESIUM OXIDE TAB 400 MG (241.3 MG ELEMENTAL MG) SCH TAB: 400 (241.3 MG) TAB at 19:01

## 2017-12-18 RX ADMIN — Medication PRN APPLICFUL: at 23:37

## 2017-12-18 RX ADMIN — Medication SCH MG: at 09:39

## 2017-12-18 RX ADMIN — Medication SCH UNIT: at 09:39

## 2017-12-18 RX ADMIN — ANORECTAL OINTMENT PRN APPLIC: 15.7; .44; 24; 20.6 OINTMENT TOPICAL at 23:40

## 2017-12-19 RX ADMIN — Medication SCH MG: at 18:17

## 2017-12-19 RX ADMIN — Medication SCH MG: at 18:16

## 2017-12-19 RX ADMIN — Medication SCH EACH: at 18:17

## 2017-12-19 RX ADMIN — MAGNESIUM OXIDE TAB 400 MG (241.3 MG ELEMENTAL MG) SCH TAB: 400 (241.3 MG) TAB at 18:17

## 2017-12-19 RX ADMIN — Medication SCH: at 13:40

## 2017-12-20 RX ADMIN — Medication SCH: at 18:11

## 2017-12-20 RX ADMIN — MAGNESIUM OXIDE TAB 400 MG (241.3 MG ELEMENTAL MG) SCH TAB: 400 (241.3 MG) TAB at 17:32

## 2017-12-20 RX ADMIN — Medication SCH UNIT: at 11:02

## 2017-12-20 RX ADMIN — Medication SCH MG: at 17:32

## 2017-12-20 RX ADMIN — MAGNESIUM OXIDE TAB 400 MG (241.3 MG ELEMENTAL MG) SCH: 400 (241.3 MG) TAB at 18:11

## 2017-12-20 RX ADMIN — Medication SCH MG: at 11:02

## 2017-12-20 RX ADMIN — Medication SCH EACH: at 17:31

## 2017-12-20 RX ADMIN — Medication SCH MCG: at 06:02

## 2017-12-20 RX ADMIN — Medication SCH MG: at 17:31

## 2017-12-21 RX ADMIN — MAGNESIUM OXIDE TAB 400 MG (241.3 MG ELEMENTAL MG) SCH TAB: 400 (241.3 MG) TAB at 18:10

## 2017-12-21 RX ADMIN — Medication SCH EACH: at 18:11

## 2017-12-21 RX ADMIN — Medication SCH MG: at 09:42

## 2017-12-21 RX ADMIN — Medication SCH MCG: at 06:28

## 2017-12-21 RX ADMIN — DEXTRAN 70, HYPROMELLOSE 2910 PRN EACH: 3; 1 SOLUTION/ DROPS OPHTHALMIC at 20:39

## 2017-12-21 RX ADMIN — Medication SCH MG: at 18:11

## 2017-12-21 RX ADMIN — Medication SCH MG: at 18:10

## 2017-12-21 RX ADMIN — Medication SCH UNIT: at 09:43

## 2017-12-21 RX ADMIN — Medication SCH MG: at 09:43

## 2017-12-22 RX ADMIN — Medication SCH UNIT: at 10:18

## 2017-12-22 RX ADMIN — MAGNESIUM OXIDE TAB 400 MG (241.3 MG ELEMENTAL MG) SCH TAB: 400 (241.3 MG) TAB at 19:36

## 2017-12-22 RX ADMIN — Medication SCH EACH: at 19:36

## 2017-12-22 RX ADMIN — DEXTRAN 70, HYPROMELLOSE 2910 PRN EACH: 3; 1 SOLUTION/ DROPS OPHTHALMIC at 10:19

## 2017-12-22 RX ADMIN — Medication SCH MCG: at 06:05

## 2017-12-22 RX ADMIN — Medication SCH MG: at 10:18

## 2017-12-22 RX ADMIN — Medication SCH MG: at 19:35

## 2017-12-23 RX ADMIN — Medication SCH UNIT: at 10:28

## 2017-12-23 RX ADMIN — Medication SCH MG: at 18:12

## 2017-12-23 RX ADMIN — Medication SCH EACH: at 18:12

## 2017-12-23 RX ADMIN — MAGNESIUM OXIDE TAB 400 MG (241.3 MG ELEMENTAL MG) SCH TAB: 400 (241.3 MG) TAB at 18:12

## 2017-12-23 RX ADMIN — Medication SCH MG: at 10:28

## 2017-12-24 RX ADMIN — Medication SCH MG: at 09:43

## 2017-12-24 RX ADMIN — Medication SCH EACH: at 17:49

## 2017-12-24 RX ADMIN — MAGNESIUM OXIDE TAB 400 MG (241.3 MG ELEMENTAL MG) SCH: 400 (241.3 MG) TAB at 18:12

## 2017-12-24 RX ADMIN — Medication SCH: at 18:12

## 2017-12-24 RX ADMIN — Medication SCH UNIT: at 09:42

## 2017-12-24 RX ADMIN — MAGNESIUM OXIDE TAB 400 MG (241.3 MG ELEMENTAL MG) SCH TAB: 400 (241.3 MG) TAB at 17:49

## 2017-12-24 RX ADMIN — Medication SCH MCG: at 06:23

## 2017-12-24 RX ADMIN — Medication SCH EACH: at 23:43

## 2017-12-24 RX ADMIN — Medication SCH MG: at 17:49

## 2017-12-24 RX ADMIN — Medication SCH MG: at 09:42

## 2017-12-24 RX ADMIN — Medication SCH MG: at 17:50

## 2017-12-25 RX ADMIN — MAGNESIUM OXIDE TAB 400 MG (241.3 MG ELEMENTAL MG) SCH TAB: 400 (241.3 MG) TAB at 18:25

## 2017-12-25 RX ADMIN — Medication SCH EACH: at 18:25

## 2017-12-25 RX ADMIN — Medication SCH MG: at 10:33

## 2017-12-25 RX ADMIN — Medication SCH MCG: at 06:20

## 2017-12-25 RX ADMIN — Medication SCH UNIT: at 10:33

## 2017-12-25 RX ADMIN — Medication SCH MG: at 18:24

## 2017-12-26 RX ADMIN — Medication SCH MG: at 18:59

## 2017-12-26 RX ADMIN — Medication SCH MG: at 19:00

## 2017-12-26 RX ADMIN — Medication SCH MG: at 10:20

## 2017-12-26 RX ADMIN — Medication SCH EACH: at 19:00

## 2017-12-26 RX ADMIN — MAGNESIUM OXIDE TAB 400 MG (241.3 MG ELEMENTAL MG) SCH TAB: 400 (241.3 MG) TAB at 18:59

## 2017-12-26 RX ADMIN — Medication SCH UNIT: at 10:20

## 2017-12-26 RX ADMIN — DEXTRAN 70, HYPROMELLOSE 2910 PRN EACH: 3; 1 SOLUTION/ DROPS OPHTHALMIC at 10:26

## 2017-12-26 RX ADMIN — Medication SCH MCG: at 06:19

## 2017-12-27 RX ADMIN — MAGNESIUM OXIDE TAB 400 MG (241.3 MG ELEMENTAL MG) SCH TAB: 400 (241.3 MG) TAB at 18:44

## 2017-12-27 RX ADMIN — Medication SCH MG: at 09:48

## 2017-12-27 RX ADMIN — Medication SCH EACH: at 18:44

## 2017-12-27 RX ADMIN — Medication SCH MG: at 09:49

## 2017-12-27 RX ADMIN — Medication SCH MG: at 18:43

## 2017-12-27 RX ADMIN — Medication SCH UNIT: at 09:48

## 2017-12-27 RX ADMIN — Medication SCH MG: at 18:44

## 2017-12-27 RX ADMIN — Medication SCH MCG: at 06:20

## 2017-12-28 RX ADMIN — Medication SCH MCG: at 06:48

## 2017-12-28 RX ADMIN — Medication SCH MG: at 10:49

## 2017-12-28 RX ADMIN — Medication SCH MG: at 18:32

## 2017-12-28 RX ADMIN — Medication SCH MG: at 18:33

## 2017-12-28 RX ADMIN — Medication SCH MG: at 10:48

## 2017-12-28 RX ADMIN — Medication SCH EACH: at 18:32

## 2017-12-28 RX ADMIN — MAGNESIUM OXIDE TAB 400 MG (241.3 MG ELEMENTAL MG) SCH TAB: 400 (241.3 MG) TAB at 18:32

## 2017-12-28 RX ADMIN — Medication SCH UNIT: at 10:49

## 2017-12-29 RX ADMIN — DEXTRAN 70, HYPROMELLOSE 2910 PRN EACH: 3; 1 SOLUTION/ DROPS OPHTHALMIC at 10:06

## 2017-12-29 RX ADMIN — Medication SCH MCG: at 06:36

## 2017-12-29 RX ADMIN — Medication SCH MG: at 10:07

## 2017-12-29 RX ADMIN — Medication SCH MG: at 10:06

## 2017-12-29 RX ADMIN — Medication SCH UNIT: at 10:06

## 2017-12-29 RX ADMIN — Medication SCH MG: at 18:11

## 2017-12-29 RX ADMIN — MAGNESIUM OXIDE TAB 400 MG (241.3 MG ELEMENTAL MG) SCH TAB: 400 (241.3 MG) TAB at 18:11

## 2017-12-29 RX ADMIN — Medication SCH EACH: at 18:12

## 2017-12-30 RX ADMIN — Medication SCH MG: at 10:21

## 2017-12-30 RX ADMIN — Medication SCH EACH: at 18:20

## 2017-12-30 RX ADMIN — Medication SCH UNIT: at 10:21

## 2017-12-30 RX ADMIN — MAGNESIUM OXIDE TAB 400 MG (241.3 MG ELEMENTAL MG) SCH TAB: 400 (241.3 MG) TAB at 18:20

## 2017-12-30 RX ADMIN — Medication SCH MG: at 18:20

## 2017-12-30 RX ADMIN — Medication SCH MG: at 10:20

## 2017-12-31 RX ADMIN — Medication PRN APPLICFUL: at 23:54

## 2017-12-31 RX ADMIN — Medication SCH UNIT: at 10:50

## 2017-12-31 RX ADMIN — Medication SCH MG: at 10:51

## 2017-12-31 RX ADMIN — Medication SCH MG: at 18:26

## 2017-12-31 RX ADMIN — Medication SCH MG: at 10:54

## 2017-12-31 RX ADMIN — Medication SCH MCG: at 08:10

## 2017-12-31 RX ADMIN — Medication SCH EACH: at 23:52

## 2017-12-31 RX ADMIN — ANORECTAL OINTMENT PRN APPLIC: 15.7; .44; 24; 20.6 OINTMENT TOPICAL at 23:54

## 2017-12-31 RX ADMIN — Medication SCH MG: at 18:25

## 2017-12-31 RX ADMIN — MAGNESIUM OXIDE TAB 400 MG (241.3 MG ELEMENTAL MG) SCH TAB: 400 (241.3 MG) TAB at 18:26

## 2017-12-31 RX ADMIN — Medication SCH EACH: at 18:26

## 2018-01-01 RX ADMIN — Medication SCH EACH: at 18:41

## 2018-01-01 RX ADMIN — DEXTRAN 70, HYPROMELLOSE 2910 PRN EACH: 3; 1 SOLUTION/ DROPS OPHTHALMIC at 10:59

## 2018-01-01 RX ADMIN — Medication PRN APPLICFUL: at 23:00

## 2018-01-01 RX ADMIN — Medication SCH MG: at 18:42

## 2018-01-01 RX ADMIN — Medication SCH MG: at 18:41

## 2018-01-01 RX ADMIN — ANORECTAL OINTMENT PRN APPLIC: 15.7; .44; 24; 20.6 OINTMENT TOPICAL at 23:00

## 2018-01-01 RX ADMIN — MICONAZOLE NITRATE PRN APPLIC: 2 POWDER TOPICAL at 10:59

## 2018-01-01 RX ADMIN — Medication SCH MG: at 10:58

## 2018-01-01 RX ADMIN — Medication SCH MG: at 10:57

## 2018-01-01 RX ADMIN — Medication SCH UNIT: at 10:57

## 2018-01-01 RX ADMIN — Medication SCH MCG: at 06:27

## 2018-01-01 RX ADMIN — MAGNESIUM OXIDE TAB 400 MG (241.3 MG ELEMENTAL MG) SCH TAB: 400 (241.3 MG) TAB at 18:41

## 2018-01-02 ENCOUNTER — HOSPITAL ENCOUNTER (INPATIENT)
Dept: HOSPITAL 50 - VM.MS | Age: 83
Discharge: NURSING, REHAB OR LONG-TERM CARE | DRG: 309 | End: 2018-01-02
Attending: INTERNAL MEDICINE | Admitting: INTERNAL MEDICINE
Payer: MEDICAID

## 2018-01-02 VITALS — DIASTOLIC BLOOD PRESSURE: 62 MMHG | SYSTOLIC BLOOD PRESSURE: 118 MMHG

## 2018-01-02 DIAGNOSIS — L57.0: ICD-10-CM

## 2018-01-02 DIAGNOSIS — Z86.73: ICD-10-CM

## 2018-01-02 DIAGNOSIS — D69.6: ICD-10-CM

## 2018-01-02 DIAGNOSIS — M17.0: ICD-10-CM

## 2018-01-02 DIAGNOSIS — R42: ICD-10-CM

## 2018-01-02 DIAGNOSIS — E03.9: ICD-10-CM

## 2018-01-02 DIAGNOSIS — H57.8: ICD-10-CM

## 2018-01-02 DIAGNOSIS — I48.91: Primary | ICD-10-CM

## 2018-01-02 DIAGNOSIS — I10: ICD-10-CM

## 2018-01-02 DIAGNOSIS — R53.1: ICD-10-CM

## 2018-01-02 DIAGNOSIS — Z79.899: ICD-10-CM

## 2018-01-02 DIAGNOSIS — N89.8: ICD-10-CM

## 2018-01-02 DIAGNOSIS — K62.5: ICD-10-CM

## 2018-01-02 PROCEDURE — G0008 ADMIN INFLUENZA VIRUS VAC: HCPCS

## 2018-01-02 RX ADMIN — Medication SCH MG: at 09:24

## 2018-01-02 RX ADMIN — Medication SCH MCG: at 06:08

## 2018-01-02 RX ADMIN — Medication SCH MG: at 09:22

## 2018-01-02 RX ADMIN — Medication SCH UNIT: at 09:23
